# Patient Record
Sex: FEMALE | Race: WHITE | NOT HISPANIC OR LATINO | Employment: UNEMPLOYED | URBAN - METROPOLITAN AREA
[De-identification: names, ages, dates, MRNs, and addresses within clinical notes are randomized per-mention and may not be internally consistent; named-entity substitution may affect disease eponyms.]

---

## 2017-06-26 ENCOUNTER — HOSPITAL ENCOUNTER (OUTPATIENT)
Dept: MRI IMAGING | Facility: HOSPITAL | Age: 42
Discharge: HOME | End: 2017-06-26
Attending: PSYCHIATRY & NEUROLOGY | Admitting: PSYCHIATRY & NEUROLOGY

## 2019-06-12 PROBLEM — Z92.89 HISTORY OF ECHOCARDIOGRAM: Status: ACTIVE | Noted: 2018-12-17

## 2019-07-12 ENCOUNTER — TELEPHONE (OUTPATIENT)
Dept: CARDIOLOGY | Facility: CLINIC | Age: 44
End: 2019-07-12

## 2019-07-12 NOTE — TELEPHONE ENCOUNTER
Patient called this morning stating she has been out of her xarelto 15mg for about 3-4 days and needs samples from the office. I let her know we have some here at the [place] office that she could come  and she stated that she would call back today when she figured out her transportation because she thought she could come to [place] office to get the samples but we aren't there. I stressed the importance of this medication to her and that she should not be off of it and needed to come pick samples up today. Patients insurance will not cover medication so she needs samples. She stated she would call back today and she has not called back.

## 2019-07-31 ENCOUNTER — TELEPHONE (OUTPATIENT)
Dept: CARDIOLOGY | Facility: CLINIC | Age: 44
End: 2019-07-31

## 2019-08-08 DIAGNOSIS — I73.9 PVD (PERIPHERAL VASCULAR DISEASE) (HCC): Primary | ICD-10-CM

## 2019-08-08 RX ORDER — CLOPIDOGREL BISULFATE 75 MG/1
75 TABLET ORAL DAILY
Qty: 30 TABLET | Refills: 5 | Status: SHIPPED | OUTPATIENT
Start: 2019-08-08 | End: 2020-01-16

## 2019-09-11 ENCOUNTER — OFFICE VISIT (OUTPATIENT)
Dept: CARDIOLOGY | Facility: CLINIC | Age: 44
End: 2019-09-11

## 2019-09-11 VITALS
SYSTOLIC BLOOD PRESSURE: 110 MMHG | RESPIRATION RATE: 18 BRPM | BODY MASS INDEX: 33.33 KG/M2 | DIASTOLIC BLOOD PRESSURE: 64 MMHG | HEIGHT: 69 IN | WEIGHT: 225 LBS | HEART RATE: 85 BPM

## 2019-09-11 DIAGNOSIS — E08.21 DIABETES MELLITUS DUE TO UNDERLYING CONDITION WITH DIABETIC NEPHROPATHY, WITH LONG-TERM CURRENT USE OF INSULIN (HCC): ICD-10-CM

## 2019-09-11 DIAGNOSIS — I73.9 PERIPHERAL VASCULAR DISEASE (HCC): Primary | ICD-10-CM

## 2019-09-11 DIAGNOSIS — R07.89 CHEST PAIN, ATYPICAL: ICD-10-CM

## 2019-09-11 DIAGNOSIS — Z79.4 DIABETES MELLITUS DUE TO UNDERLYING CONDITION WITH DIABETIC NEPHROPATHY, WITH LONG-TERM CURRENT USE OF INSULIN (HCC): ICD-10-CM

## 2019-09-11 PROCEDURE — 99214 OFFICE O/P EST MOD 30 MIN: CPT | Performed by: INTERNAL MEDICINE

## 2019-09-11 RX ORDER — LISINOPRIL 5 MG/1
5 TABLET ORAL DAILY
Refills: 3 | COMMUNITY
Start: 2019-08-29 | End: 2020-06-10 | Stop reason: SDUPTHER

## 2019-09-11 RX ORDER — ALBUTEROL SULFATE 90 UG/1
2 AEROSOL, METERED RESPIRATORY (INHALATION) EVERY 4 HOURS PRN
COMMUNITY

## 2019-09-11 NOTE — PROGRESS NOTES
Cardiology clinic note  Merrick Sarmiento MD, PhD  [facility]  Subjective:     Encounter Date:09/11/2019      Patient ID: Constanza Mccall is a 44 y.o. female.    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  Ultimately she was placed on anticoagulation and followed up with repeat AIF with apparent improvement in her distal below the knee runoff.  She has no wounds on her left lower extremity.  During that second procedure she was noted to have ostial SFA disease greater than 80% with significant greater than 30 mm gradient between the SFA and femoral artery.  It underwent CSI with drug-coated balloon angioplasty with good results in mild dissection which healed.  She was ultimately brought back demonstrating good healing of this ostial SFA as well as good distal runoff but she had recurrence of stenosis in the peroneal TP trunk as well as proximal anterior tibial on the left.  She continues to smoke heavily greater than 2 packs/day despite numerous counseling at each visit.  She has been tolerating Plavix and low-dose Xarelto 2.5 twice daily medical therapy.  She has significant diabetic neuropathy with pain in bilateral feet preventing good ambulation.  She has never had a stress evaluation but does remark about sharp substernal left-sided chest pains intermittently.  She is extremely sedentary and does not exert herself.  She has  coronary risk factors of hypertension hyperlipidemia diabetes requiring insulin as well as heavy long-term smoking.  Her EF was previously normal by echo December 2018 EF 60%.  Her concerns today are bilateral lower extremity pain.  Her wound in the right lower extremity which appears an abrasion site rather than arterial insufficiency is well-healed.  No other complaints today no 12 point review of systems except with mentioned above        The following portions of the patient's history were reviewed and updated as appropriate: allergies, current medications, past family history, past medical history, past social history, past surgical history and problem list.    Problem List:  Patient Active Problem List   Diagnosis   • Diabetes mellitus (CMS/HCC)   • Peripheral vascular disease (CMS/HCC)   • History of echocardiogram       Past Medical History:  Past Medical History:   Diagnosis Date   • Diabetes mellitus (CMS/HCC)    • History of echocardiogram 12/17/2018    EF 60% Normal LV diastolic filling parameters. There is normal LV wall thickness. Normla trileaflet aortic valve. Normal mitral valve. Normal LV/RV fucntion with no significant valvulopathy. Normla right anf left pressures estimated.    • Peripheral vascular disease (CMS/HCC)        Past Surgical History:  Past Surgical History:   Procedure Laterality Date   • CHOLECYSTECTOMY         Social History:  Social History     Socioeconomic History   • Marital status:      Spouse name: Not on file   • Number of children: Not on file   • Years of education: Not on file   • Highest education level: Not on file   Tobacco Use   • Smoking status: Current Every Day Smoker     Packs/day: 1.00     Types: Cigarettes   Substance and Sexual Activity   • Alcohol use: No     Frequency: Never   • Drug use: Defer   • Sexual activity: Defer       Allergies:  No Known Allergies    Immunizations:    There is no immunization history on file for this  "patient.    ROS:  ROS  Unremarkable x12 point review of systems except as mentioned above     Objective:         /64 (BP Location: Left arm, Patient Position: Sitting)   Pulse 85   Resp 18   Ht 175.3 cm (69\")   Wt 102 kg (225 lb)   BMI 33.23 kg/m²     Physical Exam  No acute distress alert and oriented x3 afebrile vital signs stable, smells profusely of smoke  Normocephalic atraumatic pupils equal round reactive light extract move intact bilaterally  Edentulous poor dentition  No JVD or carotid bruits  Regular rate and rhythm no rubs gallops  Bilaterally with bronchial breath sounds mild expiratory wheezes  Abdomen obese soft nontender nondistended bowel sounds positive  No clubbing cyanosis or edema  Cap refill less than 2 seconds bilaterally  Neurologically grossly intact bilaterally  No bony abnormalities grossly  Normal mood and affect    In-Office Procedure(s):  Procedures    ASCVD RIsk Score::  The 10-year ASCVD risk score (Khloe BUENO Jr., et al., 2013) is: 6.1%    Values used to calculate the score:      Age: 44 years      Sex: Female      Is Non- : No      Diabetic: Yes      Tobacco smoker: Yes      Systolic Blood Pressure: 110 mmHg      Is BP treated: No      HDL Cholesterol: 31 mg/dL      Total Cholesterol: 153 mg/dL    Recent Radiology:  Imaging Results (most recent)     None          Lab Review:   No visits with results within 6 Month(s) from this visit.   Latest known visit with results is:   No results found for any previous visit.                Assessment:          Diagnosis Plan   1. Peripheral vascular disease (CMS/HCC)     2. Diabetes mellitus due to underlying condition with diabetic nephropathy, with long-term current use of insulin (CMS/[facility])            Plan:      Peripheral vascular disease  Continue Plavix as well as low-dose Xarelto 2.5 twice daily  High intensity statin therapy  Repeat ABIs and Dopplers with segmental pressures    Diabetes  Now on Lantus, per " primary care management    Hypertension, controlled blood pressure 110/64, continue lisinopril with setting of diabetes, creatinine normal    CAD with risk factors and atypical chest pain  Order Lexiscan for ischemic evaluation with risk factors of hypertension, hyperlipidemia, insulin-dependent diabetes and heavy smoking history  Primary prevention goals presently, secondary prevention goals for PVD    Patient will be called to follow-up with testing results    Return to clinic in 3 months      It is a pleasure to be involved in her cardiovascular care.  Continue to follow, please call with any questions or concerns.           Sincerely,       Merrick Sarmiento MD, PhD  09/11/19  .

## 2019-09-25 ENCOUNTER — TELEPHONE (OUTPATIENT)
Dept: CARDIOLOGY | Facility: CLINIC | Age: 44
End: 2019-09-25

## 2019-09-25 NOTE — TELEPHONE ENCOUNTER
Patient is requesting samples of Xarelto 2.5 mg samples, she lives in [place] & would like to pick them up today, if you can call her to let her know if you have any at that location. Pt # [phone]. [email or web address]

## 2019-09-25 NOTE — TELEPHONE ENCOUNTER
I dont have any xarelto here in [place] office today with me. Spoke with patient she only has enough for tonight and tomorrow mornings dose. Will try to arrange to get samples to her tomorrow if possible.... Informed patient to give a notice in advance so we are able to provide samples for her.

## 2019-09-27 ENCOUNTER — APPOINTMENT (OUTPATIENT)
Dept: CARDIOLOGY | Facility: HOSPITAL | Age: 44
End: 2019-09-27

## 2019-09-27 ENCOUNTER — APPOINTMENT (OUTPATIENT)
Dept: NUCLEAR MEDICINE | Facility: HOSPITAL | Age: 44
End: 2019-09-27

## 2019-10-04 ENCOUNTER — HOSPITAL ENCOUNTER (OUTPATIENT)
Dept: CARDIOLOGY | Facility: HOSPITAL | Age: 44
Discharge: HOME | End: 2019-10-04
Admitting: INTERNAL MEDICINE

## 2019-10-04 ENCOUNTER — APPOINTMENT (OUTPATIENT)
Dept: CARDIOLOGY | Facility: HOSPITAL | Age: 44
End: 2019-10-04

## 2019-10-04 ENCOUNTER — HOSPITAL ENCOUNTER (OUTPATIENT)
Dept: NUCLEAR MEDICINE | Facility: HOSPITAL | Age: 44
Discharge: HOME | End: 2019-10-04

## 2019-10-04 DIAGNOSIS — I73.9 PERIPHERAL VASCULAR DISEASE (HCC): ICD-10-CM

## 2019-10-04 DIAGNOSIS — R07.89 CHEST PAIN, ATYPICAL: ICD-10-CM

## 2019-10-04 LAB
BH CV LOWER ARTERIAL LEFT ABI RATIO: 0.77
BH CV LOWER ARTERIAL LEFT DORSALIS PEDIS SYS MAX: 90 MMHG
BH CV LOWER ARTERIAL LEFT GREAT TOE SYS MAX: 55 MMHG
BH CV LOWER ARTERIAL LEFT POST TIBIAL SYS MAX: 83 MMHG
BH CV LOWER ARTERIAL LEFT TBI RATIO: 0.47
BH CV LOWER ARTERIAL RIGHT ABI RATIO: 1.02
BH CV LOWER ARTERIAL RIGHT DORSALIS PEDIS SYS MAX: 119 MMHG
BH CV LOWER ARTERIAL RIGHT GREAT TOE SYS MAX: 82 MMHG
BH CV LOWER ARTERIAL RIGHT POST TIBIAL SYS MAX: 88 MMHG
BH CV LOWER ARTERIAL RIGHT TBI RATIO: 0.7
UPPER ARTERIAL LEFT ARM BRACHIAL SYS MAX: 117 MMHG
UPPER ARTERIAL RIGHT ARM BRACHIAL SYS MAX: 113 MMHG

## 2019-10-04 PROCEDURE — 0 TECHNETIUM SESTAMIBI: Performed by: INTERNAL MEDICINE

## 2019-10-04 PROCEDURE — 93018 CV STRESS TEST I&R ONLY: CPT | Performed by: INTERNAL MEDICINE

## 2019-10-04 PROCEDURE — 93922 UPR/L XTREMITY ART 2 LEVELS: CPT

## 2019-10-04 PROCEDURE — 93016 CV STRESS TEST SUPVJ ONLY: CPT | Performed by: NURSE PRACTITIONER

## 2019-10-04 PROCEDURE — 78452 HT MUSCLE IMAGE SPECT MULT: CPT

## 2019-10-04 PROCEDURE — A9500 TC99M SESTAMIBI: HCPCS | Performed by: INTERNAL MEDICINE

## 2019-10-04 PROCEDURE — 25010000002 REGADENOSON 0.4 MG/5ML SOLUTION: Performed by: INTERNAL MEDICINE

## 2019-10-04 PROCEDURE — 78452 HT MUSCLE IMAGE SPECT MULT: CPT | Performed by: INTERNAL MEDICINE

## 2019-10-04 PROCEDURE — 93017 CV STRESS TEST TRACING ONLY: CPT

## 2019-10-04 RX ADMIN — TECHNETIUM TC 99M SESTAMIBI 1 DOSE: 1 INJECTION INTRAVENOUS at 10:34

## 2019-10-04 RX ADMIN — TECHNETIUM TC 99M SESTAMIBI 1 DOSE: 1 INJECTION INTRAVENOUS at 12:10

## 2019-10-04 RX ADMIN — REGADENOSON 0.4 MG: 0.08 INJECTION, SOLUTION INTRAVENOUS at 12:10

## 2019-10-08 LAB
BH CV NUCLEAR PRIOR STUDY: 3
BH CV STRESS BP STAGE 1: NORMAL
BH CV STRESS BP STAGE 2: NORMAL
BH CV STRESS COMMENTS STAGE 1: NORMAL
BH CV STRESS COMMENTS STAGE 2: NORMAL
BH CV STRESS DOSE REGADENOSON STAGE 1: 0.4
BH CV STRESS DURATION MIN STAGE 1: 4
BH CV STRESS DURATION MIN STAGE 2: 4
BH CV STRESS DURATION SEC STAGE 1: 10
BH CV STRESS DURATION SEC STAGE 2: 0
BH CV STRESS HR STAGE 1: 96
BH CV STRESS HR STAGE 2: 78
BH CV STRESS PROTOCOL 1: NORMAL
BH CV STRESS RECOVERY BP: NORMAL MMHG
BH CV STRESS RECOVERY HR: 78 BPM
BH CV STRESS STAGE 1: 1
BH CV STRESS STAGE 2: 2
LV EF NUC BP: 68 %
MAXIMAL PREDICTED HEART RATE: 176 BPM
PERCENT MAX PREDICTED HR: 54.55 %
STRESS BASELINE BP: NORMAL MMHG
STRESS BASELINE HR: 78 BPM
STRESS PERCENT HR: 64 %
STRESS POST PEAK BP: NORMAL MMHG
STRESS POST PEAK HR: 96 BPM
STRESS TARGET HR: 150 BPM

## 2019-10-09 ENCOUNTER — TELEPHONE (OUTPATIENT)
Dept: CARDIOLOGY | Facility: CLINIC | Age: 44
End: 2019-10-09

## 2019-10-10 DIAGNOSIS — R94.39 ABNORMAL STRESS TEST: Primary | ICD-10-CM

## 2019-10-10 DIAGNOSIS — R07.89 CHEST PAIN, ATYPICAL: ICD-10-CM

## 2019-10-10 NOTE — TELEPHONE ENCOUNTER
Abnormal stress. Per dr harmon he recommends patient to have LHC. Spoke with patient she is ok with proceeding with cath. Will place order for cath and send to mae for scheduling. ALEXUS

## 2019-10-11 DIAGNOSIS — Z01.818 PRE-OP TESTING: ICD-10-CM

## 2019-10-11 DIAGNOSIS — R07.89 CHEST PAIN, ATYPICAL: Primary | ICD-10-CM

## 2019-10-11 PROBLEM — R94.39 ABNORMAL STRESS TEST: Status: ACTIVE | Noted: 2019-10-11

## 2019-10-15 ENCOUNTER — HOSPITAL ENCOUNTER (OUTPATIENT)
Facility: HOSPITAL | Age: 44
Discharge: HOME | End: 2019-10-16
Attending: INTERNAL MEDICINE | Admitting: INTERNAL MEDICINE

## 2019-10-15 ENCOUNTER — LAB (OUTPATIENT)
Dept: LAB | Facility: HOSPITAL | Age: 44
End: 2019-10-15

## 2019-10-15 DIAGNOSIS — R07.89 CHEST PAIN, ATYPICAL: ICD-10-CM

## 2019-10-15 DIAGNOSIS — R94.39 ABNORMAL STRESS TEST: ICD-10-CM

## 2019-10-15 DIAGNOSIS — Z01.818 PRE-OP TESTING: ICD-10-CM

## 2019-10-15 LAB
ALBUMIN SERPL-MCNC: 4.1 G/DL (ref 3.5–5.2)
ALBUMIN/GLOB SERPL: 1.3 G/DL
ALP SERPL-CCNC: 66 U/L (ref 39–117)
ALT SERPL W P-5'-P-CCNC: 16 U/L (ref 1–33)
ANION GAP SERPL CALCULATED.3IONS-SCNC: 16.2 MMOL/L (ref 5–15)
AST SERPL-CCNC: 18 U/L (ref 1–32)
BASOPHILS # BLD AUTO: 0 10*3/MM3 (ref 0–0.2)
BASOPHILS NFR BLD AUTO: 0.6 % (ref 0–1.5)
BILIRUB SERPL-MCNC: 0.3 MG/DL (ref 0.2–1.2)
BUN BLD-MCNC: 6 MG/DL (ref 6–20)
BUN/CREAT SERPL: 10.5 (ref 7–25)
CALCIUM SPEC-SCNC: 9.7 MG/DL (ref 8.6–10.5)
CHLORIDE SERPL-SCNC: 100 MMOL/L (ref 98–107)
CO2 SERPL-SCNC: 23 MMOL/L (ref 22–29)
CREAT BLD-MCNC: 0.57 MG/DL (ref 0.57–1)
DEPRECATED RDW RBC AUTO: 49 FL (ref 37–54)
EOSINOPHIL # BLD AUTO: 0.4 10*3/MM3 (ref 0–0.4)
EOSINOPHIL NFR BLD AUTO: 6.4 % (ref 0.3–6.2)
ERYTHROCYTE [DISTWIDTH] IN BLOOD BY AUTOMATED COUNT: 16.1 % (ref 12.3–15.4)
GFR SERPL CREATININE-BSD FRML MDRD: 115 ML/MIN/1.73
GLOBULIN UR ELPH-MCNC: 3.2 GM/DL
GLUCOSE BLD-MCNC: 167 MG/DL (ref 65–99)
GLUCOSE BLDC GLUCOMTR-MCNC: 108 MG/DL (ref 70–105)
GLUCOSE BLDC GLUCOMTR-MCNC: 258 MG/DL (ref 70–105)
HCT VFR BLD AUTO: 42.2 % (ref 34–46.6)
HGB BLD-MCNC: 14 G/DL (ref 12–15.9)
INR PPP: 0.97 (ref 0.9–1.1)
LYMPHOCYTES # BLD AUTO: 2.2 10*3/MM3 (ref 0.7–3.1)
LYMPHOCYTES NFR BLD AUTO: 34.9 % (ref 19.6–45.3)
MCH RBC QN AUTO: 28.6 PG (ref 26.6–33)
MCHC RBC AUTO-ENTMCNC: 33.3 G/DL (ref 31.5–35.7)
MCV RBC AUTO: 86.1 FL (ref 79–97)
MONOCYTES # BLD AUTO: 0.4 10*3/MM3 (ref 0.1–0.9)
MONOCYTES NFR BLD AUTO: 6.9 % (ref 5–12)
NEUTROPHILS # BLD AUTO: 3.2 10*3/MM3 (ref 1.7–7)
NEUTROPHILS NFR BLD AUTO: 51.2 % (ref 42.7–76)
NRBC BLD AUTO-RTO: 0.1 /100 WBC (ref 0–0.2)
PLATELET # BLD AUTO: 228 10*3/MM3 (ref 140–450)
PMV BLD AUTO: 8.6 FL (ref 6–12)
POTASSIUM BLD-SCNC: 4.2 MMOL/L (ref 3.5–5.2)
PROT SERPL-MCNC: 7.3 G/DL (ref 6–8.5)
PROTHROMBIN TIME: 10.3 SECONDS (ref 9.6–11.7)
RBC # BLD AUTO: 4.9 10*6/MM3 (ref 3.77–5.28)
SODIUM BLD-SCNC: 135 MMOL/L (ref 136–145)
WBC NRBC COR # BLD: 6.3 10*3/MM3 (ref 3.4–10.8)

## 2019-10-15 PROCEDURE — 93458 L HRT ARTERY/VENTRICLE ANGIO: CPT | Performed by: INTERNAL MEDICINE

## 2019-10-15 PROCEDURE — 36415 COLL VENOUS BLD VENIPUNCTURE: CPT

## 2019-10-15 PROCEDURE — 63710000001 INSULIN GLARGINE PER 5 UNITS: Performed by: INTERNAL MEDICINE

## 2019-10-15 PROCEDURE — 93010 ELECTROCARDIOGRAM REPORT: CPT | Performed by: INTERNAL MEDICINE

## 2019-10-15 PROCEDURE — 85025 COMPLETE CBC W/AUTO DIFF WBC: CPT

## 2019-10-15 PROCEDURE — C1894 INTRO/SHEATH, NON-LASER: HCPCS | Performed by: INTERNAL MEDICINE

## 2019-10-15 PROCEDURE — 75716 ARTERY X-RAYS ARMS/LEGS: CPT | Performed by: INTERNAL MEDICINE

## 2019-10-15 PROCEDURE — 82962 GLUCOSE BLOOD TEST: CPT

## 2019-10-15 PROCEDURE — 25010000002 FENTANYL CITRATE (PF) 100 MCG/2ML SOLUTION: Performed by: INTERNAL MEDICINE

## 2019-10-15 PROCEDURE — G0378 HOSPITAL OBSERVATION PER HR: HCPCS

## 2019-10-15 PROCEDURE — 80053 COMPREHEN METABOLIC PANEL: CPT

## 2019-10-15 PROCEDURE — 94640 AIRWAY INHALATION TREATMENT: CPT

## 2019-10-15 PROCEDURE — 85610 PROTHROMBIN TIME: CPT

## 2019-10-15 PROCEDURE — C1760 CLOSURE DEV, VASC: HCPCS | Performed by: INTERNAL MEDICINE

## 2019-10-15 PROCEDURE — 0 IOPAMIDOL PER 1 ML: Performed by: INTERNAL MEDICINE

## 2019-10-15 PROCEDURE — S0260 H&P FOR SURGERY: HCPCS | Performed by: INTERNAL MEDICINE

## 2019-10-15 PROCEDURE — 93005 ELECTROCARDIOGRAM TRACING: CPT | Performed by: INTERNAL MEDICINE

## 2019-10-15 PROCEDURE — C1769 GUIDE WIRE: HCPCS | Performed by: INTERNAL MEDICINE

## 2019-10-15 PROCEDURE — 75625 CONTRAST EXAM ABDOMINL AORTA: CPT | Performed by: INTERNAL MEDICINE

## 2019-10-15 PROCEDURE — 25010000002 MIDAZOLAM PER 1 MG: Performed by: INTERNAL MEDICINE

## 2019-10-15 PROCEDURE — 99152 MOD SED SAME PHYS/QHP 5/>YRS: CPT | Performed by: INTERNAL MEDICINE

## 2019-10-15 PROCEDURE — 99153 MOD SED SAME PHYS/QHP EA: CPT | Performed by: INTERNAL MEDICINE

## 2019-10-15 RX ORDER — LISINOPRIL 5 MG/1
5 TABLET ORAL DAILY
Status: DISCONTINUED | OUTPATIENT
Start: 2019-10-15 | End: 2019-10-16 | Stop reason: HOSPADM

## 2019-10-15 RX ORDER — ATORVASTATIN CALCIUM 20 MG/1
20 TABLET, FILM COATED ORAL NIGHTLY
Status: DISCONTINUED | OUTPATIENT
Start: 2019-10-15 | End: 2019-10-16

## 2019-10-15 RX ORDER — NITROGLYCERIN 5 MG/ML
INJECTION, SOLUTION INTRAVENOUS AS NEEDED
Status: DISCONTINUED | OUTPATIENT
Start: 2019-10-15 | End: 2019-10-15 | Stop reason: HOSPADM

## 2019-10-15 RX ORDER — MIDAZOLAM HYDROCHLORIDE 1 MG/ML
INJECTION INTRAMUSCULAR; INTRAVENOUS AS NEEDED
Status: DISCONTINUED | OUTPATIENT
Start: 2019-10-15 | End: 2019-10-15 | Stop reason: HOSPADM

## 2019-10-15 RX ORDER — INSULIN GLARGINE 100 [IU]/ML
20 INJECTION, SOLUTION SUBCUTANEOUS NIGHTLY
Status: DISCONTINUED | OUTPATIENT
Start: 2019-10-15 | End: 2019-10-16 | Stop reason: HOSPADM

## 2019-10-15 RX ORDER — SODIUM CHLORIDE 9 MG/ML
INJECTION, SOLUTION INTRAVENOUS CONTINUOUS PRN
Status: COMPLETED | OUTPATIENT
Start: 2019-10-15 | End: 2019-10-15

## 2019-10-15 RX ORDER — LIDOCAINE HYDROCHLORIDE 20 MG/ML
INJECTION, SOLUTION INFILTRATION; PERINEURAL AS NEEDED
Status: DISCONTINUED | OUTPATIENT
Start: 2019-10-15 | End: 2019-10-15 | Stop reason: HOSPADM

## 2019-10-15 RX ORDER — SODIUM CHLORIDE 9 MG/ML
3 INJECTION, SOLUTION INTRAVENOUS CONTINUOUS
Status: DISPENSED | OUTPATIENT
Start: 2019-10-15 | End: 2019-10-15

## 2019-10-15 RX ORDER — ALBUTEROL SULFATE 2.5 MG/3ML
2.5 SOLUTION RESPIRATORY (INHALATION)
Status: DISCONTINUED | OUTPATIENT
Start: 2019-10-15 | End: 2019-10-16 | Stop reason: HOSPADM

## 2019-10-15 RX ORDER — FENTANYL CITRATE 50 UG/ML
INJECTION, SOLUTION INTRAMUSCULAR; INTRAVENOUS AS NEEDED
Status: DISCONTINUED | OUTPATIENT
Start: 2019-10-15 | End: 2019-10-15 | Stop reason: HOSPADM

## 2019-10-15 RX ORDER — SODIUM CHLORIDE 9 MG/ML
100 INJECTION, SOLUTION INTRAVENOUS CONTINUOUS
Status: DISCONTINUED | OUTPATIENT
Start: 2019-10-15 | End: 2019-10-16 | Stop reason: HOSPADM

## 2019-10-15 RX ORDER — GUAIFENESIN 1200 MG/1
1200 TABLET, EXTENDED RELEASE ORAL 2 TIMES DAILY
COMMUNITY

## 2019-10-15 RX ORDER — SODIUM CHLORIDE 9 MG/ML
30 INJECTION, SOLUTION INTRAVENOUS CONTINUOUS
Status: DISCONTINUED | OUTPATIENT
Start: 2019-10-15 | End: 2019-10-16 | Stop reason: HOSPADM

## 2019-10-15 RX ORDER — CLOPIDOGREL BISULFATE 75 MG/1
75 TABLET ORAL DAILY
Status: DISCONTINUED | OUTPATIENT
Start: 2019-10-16 | End: 2019-10-16 | Stop reason: HOSPADM

## 2019-10-15 RX ORDER — GABAPENTIN 400 MG/1
400 CAPSULE ORAL EVERY 8 HOURS SCHEDULED
Status: DISCONTINUED | OUTPATIENT
Start: 2019-10-15 | End: 2019-10-16 | Stop reason: HOSPADM

## 2019-10-15 RX ADMIN — INSULIN GLARGINE 20 UNITS: 100 INJECTION, SOLUTION SUBCUTANEOUS at 22:06

## 2019-10-15 RX ADMIN — SODIUM CHLORIDE 30 ML/HR: 900 INJECTION, SOLUTION INTRAVENOUS at 12:08

## 2019-10-15 RX ADMIN — ALBUTEROL SULFATE 2.5 MG: 2.5 SOLUTION RESPIRATORY (INHALATION) at 19:38

## 2019-10-15 RX ADMIN — ATORVASTATIN CALCIUM 20 MG: 20 TABLET, FILM COATED ORAL at 22:07

## 2019-10-15 RX ADMIN — LISINOPRIL 5 MG: 5 TABLET ORAL at 19:05

## 2019-10-15 RX ADMIN — GABAPENTIN 400 MG: 400 CAPSULE ORAL at 22:07

## 2019-10-15 RX ADMIN — GABAPENTIN 400 MG: 400 CAPSULE ORAL at 19:06

## 2019-10-15 NOTE — PLAN OF CARE
Problem: Cardiac Catheterization (Diagnostic/Interventional) (Adult)  Goal: Signs and Symptoms of Listed Potential Problems Will be Absent, Minimized or Managed (Cardiac Catheterization)  Outcome: Ongoing (interventions implemented as appropriate)

## 2019-10-15 NOTE — PLAN OF CARE
Problem: Cardiac Catheterization (Diagnostic/Interventional) (Adult)  Goal: Anesthesia/Sedation Recovery  Outcome: Outcome(s) achieved Date Met: 10/15/19

## 2019-10-15 NOTE — H&P
Cardiology history and physical exam  Merrick Sarmiento MD, PhD  Precatheterization H&P today  Date of service 10-15-19.     Subjective     Patient ID: Constanza Mccall is a 44 y.o. female.     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  Ultimately she was placed on anticoagulation and followed up with repeat AIF with apparent improvement in her distal below the knee runoff.  She has no wounds on her left lower extremity.  During that second procedure she was noted to have ostial SFA disease greater than 80% with significant greater than 30 mm gradient between the SFA and femoral artery.  It underwent CSI with drug-coated balloon angioplasty with good results in mild dissection which healed.  She was ultimately brought back demonstrating good healing of this ostial SFA as well as good distal runoff but she had recurrence of stenosis in the peroneal TP trunk as well as proximal anterior tibial on the left.  She continues to smoke heavily greater than 2 packs/day despite numerous counseling at each visit.  She has been tolerating Plavix and low-dose Xarelto 2.5 twice daily medical therapy.  She has significant diabetic neuropathy with pain in bilateral feet preventing good ambulation.  She has never had a stress evaluation but does remark about sharp substernal left-sided chest pains intermittently.  She is extremely  sedentary and does not exert herself.  She has coronary risk factors of hypertension hyperlipidemia diabetes requiring insulin as well as heavy long-term smoking.  Her EF was previously normal by echo December 2018 EF 60%.  Her concerns today are bilateral lower extremity pain.  Her wound in the right lower extremity which appears an abrasion site rather than arterial insufficiency is well-healed.  No other complaints today no 12 point review of systems except with mentioned above     Today she is seen.  No updates to this.  Abnormal stress test with known PAD and significant higher pretest probability with continuous smoking as well as peripheral vascular disease.  Okay for heart cath today.     The following portions of the patient's history were reviewed and updated as appropriate: allergies, current medications, past family history, past medical history, past social history, past surgical history and problem list.     Problem List:      Patient Active Problem List   Diagnosis   • Diabetes mellitus (CMS/HCC)   • Peripheral vascular disease (CMS/HCC)   • History of echocardiogram         Past Medical History:  Medical History        Past Medical History:   Diagnosis Date   • Diabetes mellitus (CMS/HCC)     • History of echocardiogram 12/17/2018     EF 60% Normal LV diastolic filling parameters. There is normal LV wall thickness. Normla trileaflet aortic valve. Normal mitral valve. Normal LV/RV fucntion with no significant valvulopathy. Normla right anf left pressures estimated.    • Peripheral vascular disease (CMS/HCC)              Past Surgical History:  Surgical History         Past Surgical History:   Procedure Laterality Date   • CHOLECYSTECTOMY                Social History:  Social History   Social History            Socioeconomic History   • Marital status:        Spouse name: Not on file   • Number of children: Not on file   • Years of education: Not on file   • Highest education level: Not on file    Tobacco Use   • Smoking status: Current Every Day Smoker       Packs/day: 1.00       Types: Cigarettes   Substance and Sexual Activity   • Alcohol use: No       Frequency: Never   • Drug use: Defer   • Sexual activity: Defer            Allergies:  No Known Allergies     Immunizations:     There is no immunization history on file for this patient.     ROS:  ROS  Unremarkable x12 point review of systems except as mentioned above     Objective:      Objective          Vitals reviewed, see nursing notes.     Physical Exam  No acute distress alert and oriented x3 afebrile vital signs stable, smells profusely of smoke  Normocephalic atraumatic pupils equal round reactive light extract move intact bilaterally  Edentulous poor dentition  No JVD or carotid bruits  Regular rate and rhythm no rubs gallops  Bilaterally with bronchial breath sounds mild expiratory wheezes  Abdomen obese soft nontender nondistended bowel sounds positive  No clubbing cyanosis or edema  Cap refill less than 2 seconds bilaterally  Neurologically grossly intact bilaterally  No bony abnormalities grossly  Normal mood and affect    Essentially unchanged.     In-Office Procedure(s):  Procedures     ASCVD RIsk Score::  The 10-year ASCVD risk score (Khloe BUENO Jr., et al., 2013) is: 6.1%    Values used to calculate the score:      Age: 44 years      Sex: Female      Is Non- : No      Diabetic: Yes      Tobacco smoker: Yes      Systolic Blood Pressure: 110 mmHg      Is BP treated: No      HDL Cholesterol: 31 mg/dL      Total Cholesterol: 153 mg/dL     Recent Radiology:      Imaging Results (most recent)      None             Lab Review:   No visits with results within 6 Month(s) from this visit.   Latest known visit with results is:   No results found for any previous visit.                     Assessment:      Assessment            Diagnosis Plan   1. Peripheral vascular disease (CMS/HCC)      2. Diabetes mellitus due to underlying  condition with diabetic nephropathy, with long-term current use of insulin (CMS/[facility])               Plan:      Peripheral vascular disease  Continue Plavix as well as low-dose Xarelto 2.5 twice daily, Xarelto held for heart cath today, continue proximal  High intensity statin therapy  Repeat ABIs and Dopplers with segmental pressures, pending at this time     Diabetes  Now on Lantus, per primary care management     Hypertension, controlled blood pressure 110/64, continue lisinopril with setting of diabetes, creatinine normal     CAD with risk factors and atypical chest pain  Abnormal stress, proceed with left heart catheterization today     Proceed with left heart catheterization today.  Definitive evaluation and risk notification.      Merrick Sarmiento MD, PhD

## 2019-10-16 VITALS
DIASTOLIC BLOOD PRESSURE: 49 MMHG | TEMPERATURE: 98.5 F | BODY MASS INDEX: 32.69 KG/M2 | HEIGHT: 69 IN | RESPIRATION RATE: 18 BRPM | OXYGEN SATURATION: 94 % | WEIGHT: 220.68 LBS | SYSTOLIC BLOOD PRESSURE: 97 MMHG | HEART RATE: 76 BPM

## 2019-10-16 PROBLEM — R94.39 ABNORMAL STRESS TEST: Status: RESOLVED | Noted: 2019-10-11 | Resolved: 2019-10-16

## 2019-10-16 PROBLEM — I25.10 CAD (CORONARY ARTERY DISEASE): Status: ACTIVE | Noted: 2019-10-16

## 2019-10-16 PROBLEM — R07.89 CHEST PAIN, ATYPICAL: Status: RESOLVED | Noted: 2019-10-11 | Resolved: 2019-10-16

## 2019-10-16 LAB
ANION GAP SERPL CALCULATED.3IONS-SCNC: 16.1 MMOL/L (ref 5–15)
BUN BLD-MCNC: 7 MG/DL (ref 6–20)
BUN/CREAT SERPL: 12.5 (ref 7–25)
CALCIUM SPEC-SCNC: 9.1 MG/DL (ref 8.6–10.5)
CHLORIDE SERPL-SCNC: 101 MMOL/L (ref 98–107)
CHOLEST SERPL-MCNC: 202 MG/DL (ref 0–200)
CO2 SERPL-SCNC: 22 MMOL/L (ref 22–29)
CREAT BLD-MCNC: 0.56 MG/DL (ref 0.57–1)
DEPRECATED RDW RBC AUTO: 48.6 FL (ref 37–54)
ERYTHROCYTE [DISTWIDTH] IN BLOOD BY AUTOMATED COUNT: 16.3 % (ref 12.3–15.4)
GFR SERPL CREATININE-BSD FRML MDRD: 118 ML/MIN/1.73
GLUCOSE BLD-MCNC: 181 MG/DL (ref 65–99)
HBA1C MFR BLD: 7.6 % (ref 3.5–5.6)
HCT VFR BLD AUTO: 38.1 % (ref 34–46.6)
HDLC SERPL-MCNC: 27 MG/DL (ref 40–60)
HGB BLD-MCNC: 12.8 G/DL (ref 12–15.9)
LDLC SERPL CALC-MCNC: 133 MG/DL (ref 0–100)
LDLC/HDLC SERPL: 4.93 {RATIO}
MCH RBC QN AUTO: 28.9 PG (ref 26.6–33)
MCHC RBC AUTO-ENTMCNC: 33.7 G/DL (ref 31.5–35.7)
MCV RBC AUTO: 85.9 FL (ref 79–97)
PLATELET # BLD AUTO: 196 10*3/MM3 (ref 140–450)
PMV BLD AUTO: 8 FL (ref 6–12)
POTASSIUM BLD-SCNC: 4.1 MMOL/L (ref 3.5–5.2)
RBC # BLD AUTO: 4.43 10*6/MM3 (ref 3.77–5.28)
SODIUM BLD-SCNC: 135 MMOL/L (ref 136–145)
TRIGL SERPL-MCNC: 210 MG/DL (ref 0–150)
VLDLC SERPL-MCNC: 42 MG/DL
WBC NRBC COR # BLD: 7.7 10*3/MM3 (ref 3.4–10.8)

## 2019-10-16 PROCEDURE — 83036 HEMOGLOBIN GLYCOSYLATED A1C: CPT | Performed by: INTERNAL MEDICINE

## 2019-10-16 PROCEDURE — 85027 COMPLETE CBC AUTOMATED: CPT | Performed by: INTERNAL MEDICINE

## 2019-10-16 PROCEDURE — 80061 LIPID PANEL: CPT | Performed by: INTERNAL MEDICINE

## 2019-10-16 PROCEDURE — 80048 BASIC METABOLIC PNL TOTAL CA: CPT | Performed by: INTERNAL MEDICINE

## 2019-10-16 PROCEDURE — 99217 PR OBSERVATION CARE DISCHARGE MANAGEMENT: CPT | Performed by: INTERNAL MEDICINE

## 2019-10-16 PROCEDURE — G0378 HOSPITAL OBSERVATION PER HR: HCPCS

## 2019-10-16 RX ORDER — ATORVASTATIN CALCIUM 40 MG/1
40 TABLET, FILM COATED ORAL NIGHTLY
Status: DISCONTINUED | OUTPATIENT
Start: 2019-10-16 | End: 2019-10-16 | Stop reason: HOSPADM

## 2019-10-16 RX ORDER — ATORVASTATIN CALCIUM 40 MG/1
40 TABLET, FILM COATED ORAL NIGHTLY
Qty: 30 TABLET | Refills: 5 | Status: SHIPPED | OUTPATIENT
Start: 2019-10-16 | End: 2020-04-16

## 2019-10-16 RX ADMIN — CLOPIDOGREL BISULFATE 75 MG: 75 TABLET ORAL at 07:54

## 2019-10-16 RX ADMIN — GABAPENTIN 400 MG: 400 CAPSULE ORAL at 07:55

## 2019-10-16 NOTE — PLAN OF CARE
Problem: Patient Care Overview  Goal: Interprofessional Rounds/Family Conf  Outcome: Outcome(s) achieved Date Met: 10/16/19

## 2019-10-16 NOTE — DISCHARGE INSTRUCTIONS
Post Cath Instructions    .  Specific Physician Instructions: ***    1) Drink plenty of fluids for the next 24 hours.  This helps to eliminate the dye used in your procedure through urination.  You may resume a normal diet; however, try to avoid foods that would cause gas or constipation.    2) Sedative medication given to you during your catheterization may decrease your judgement and reaction time for up to 24-48 hours.  Therefore:  a. DO NOT drive or operate hazardous machinery (48 hours)  b. DO NOT consume alcoholic beverages  c. DO NOT make any important/legal decisions  d. Have someone stay with you for at least 24 hours    3) To allow proper healing and prevent bleeding, the following activities are to be strictly avoided for the next 24-48 hours:  a. Excessive bending at wound site  b. Straining (anything that would tense up muscles around the affected puncture site)  c. Lifting objects greater than 5 pounds, pushing, or pulling for 5 days    i. For Groin Cases:  1. Refrain from sexual activity  2. Refrain from running or vigorous walking  3. No prolonged sitting or standing  4. Limit stair climbing as much as possible    4) Keep the puncture site clean and dry.  You may remove the dressing tomorrow and replace it with a band-aid for at least one additional day.  Gently clean the site with mild soap and water.  No scrubbing/rubbing and lightly pat the area dry.  Showers are acceptable; however, avoid submerging in water (tub baths, hot tubs, swimming pools, dishwater, etc…) for at least one week.  The site should be completely healed before resuming these activities to reduce the risk of infection.  Check the site often.  Watch for signs and symptoms of infection and notify your physician if any of the following occur:  a. Bleeding or an increase in swelling at the puncture site  b. Fever  c. Increased soreness around puncture site  d. Foul odor or significant drainage from the puncture site  e. Swelling,  redness, or warmth at the puncture site    **A bruise or small “pea sized” lump under the skin at the puncture site is not unusual.  This should disappear within 3-4 weeks.**  5) CONTACT YOUR PHYSICIAN OR CALL 911 IF YOU EXPERIENCE ANY OF THE FOLLOWING:  a. Increased angina (chest pain) or frequent sensations of pressure, burning, pain, or other discomfort in the chest, arm, jaws, or stomach  b. Lightheadedness, dizziness, faint feeling, sweating, or difficulty breathing  c. Odd sensation changes like numbness, tingling, coldness, or pain in the arm or leg in which the catheter was inserted  d. Limb in which the catheter was inserted becomes pale/bluish in color    IMPORTANT:  Although this occurs very rarely, if you should develop bright red or excessive bleeding, feel a “pop” inside at the insertion site, or notice a sudden increase in swelling larger than a walnut, you should call 911.  Hold continuous firm pressure to the access site until emergency personnel arrive.  It is best if someone else can do this for you.

## 2019-10-16 NOTE — PLAN OF CARE
Problem: Patient Care Overview  Goal: Discharge Needs Assessment  Outcome: Outcome(s) achieved Date Met: 10/16/19

## 2019-10-16 NOTE — PLAN OF CARE
Problem: Cardiac Catheterization (Diagnostic/Interventional) (Adult)  Goal: Signs and Symptoms of Listed Potential Problems Will be Absent, Minimized or Managed (Cardiac Catheterization)  Outcome: Outcome(s) achieved Date Met: 10/16/19

## 2019-10-16 NOTE — PLAN OF CARE
Problem: Patient Care Overview  Goal: Individualization and Mutuality  Outcome: Outcome(s) achieved Date Met: 10/16/19

## 2019-10-16 NOTE — DISCHARGE SUMMARY
[facility]  Merrick Sarmiento MD, PhD    DISCHARGE SUMMARY      Patient Name: Constanza Mccall  :1975  44 y.o.    Date of Admit: 10/15/2019  Date of Discharge:  10/16/2019    Discharge Diagnosis:  Problem List Items Addressed This Visit        Cardiovascular and Mediastinum    RESOLVED: Abnormal stress test    Relevant Orders    Cardiac Catheterization/Vascular Study (Completed)       Nervous and Auditory    RESOLVED: Chest pain, atypical    Relevant Orders    Cardiac Catheterization/Vascular Study (Completed)        1) CAD s/p cath 10/15 shows small vessel dz diagonal/ OM1 not amenable to revascularization  - 2D echo 2018 showed EF = 60% with no significant VHD.  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  Ultimately she was placed on anticoagulation and followed up with repeat AIF with apparent improvement in her distal below the knee runoff.  She has no wounds on her left lower extremity.  During that second procedure she was  noted to have ostial SFA disease greater than 80% with significant greater than 30 mm gradient between the SFA and femoral artery.  It underwent CSI with drug-coated balloon angioplasty with good results in mild dissection which healed.  She was ultimately brought back demonstrating good healing of this ostial SFA as well as good distal runoff but she had recurrence of stenosis in the peroneal TP trunk as well as proximal anterior tibial on the left.  She continues to smoke heavily greater than 2 packs/day despite numerous counseling at each visit.  She has been tolerating Plavix and low-dose Xarelto 2.5 twice daily medical therapy.  She has significant diabetic neuropathy with pain in bilateral feet preventing good ambulation.  She has never had a stress evaluation but does remark about sharp substernal left-sided chest pains intermittently.  She is extremely sedentary and does not exert herself.  She has coronary risk factors of hypertension hyperlipidemia diabetes requiring insulin as well as heavy long-term smoking.  Her EF was previously normal by echo December 2018 EF 60%.  Her concerns today are bilateral lower extremity pain.  Her wound in the right lower extremity which appears an abrasion site rather than arterial insufficiency is well-healed.  No other complaints today no 12 point review of systems except with mentioned above     Abnormal stress test with known PAD and significant higher pretest probability with continuous smoking as well as peripheral vascular disease.      Patient underwent left heart catheterization with coronary angiography and AIF.  She was found with small vessel CAD (details in report below) not amenable to PCI and obstructive dz ostial proximal left iliac artery, in which she is planned for staged PTLA.  Patient was observed in the hospital overnight due to failed vascular closure device to ensure no significant bleeding complication..  Today she feels well and denies CP or SOA  she has had no bleeding overnight and is ambulatory..  She has ambulated without difficulty and has no right groin hematoma.  Post cath Cr is WNL.  I discussed with the patient indications for lower extremity revascularization and she will cognizant of symptoms of left lower extremity issues or paresthesias although this is somewhat difficult given significant neuropathy and she will decrease her smoking at all costs as I advised with significant vascular disease and debility at such a young age.  I have informed her that smoking will ultimately cost her years of her life as well as significant debility and loss of function.  She has advancing lower extremity peripheral vascular disease and I am concerned about inflow to the left lower extremity which may worsen perfusion pressure.  Her symptoms are somewhat difficult to characterize given significant neuropathy as well as very limited activity and she is unable to perform exercise ABIs.  She will be discharged today to home with follow-up to 4 weeks.        Procedures Performed  Procedure(s):  Left Heart Cath    Indications     Abnormal stress test [R94.39 (ICD-10-CM)]   Chest pain, atypical [R07.89 (ICD-10-CM)]       Conclusion      Merrick Sarmiento MD, PhD  Date of service 10-15-19     Procedure  1.  Left heart catheterization and coronary angiography left ventricular atrophy in FRANK position  2.  Distal aortography with iliofemoral runoff bilaterally  3.  Selective retrograde right femoral angiography with distal runoff to the knee  4.  Nonselective left iliofemoral and SFA/profunda runoff     Indication  1.  Abnormal stress test with high risk for CAD, PAD known, dyspnea on exertion.   2.  Peripheral vascular disease with significant claudication symptoms and known inflow disease, abnormal ABIs.     Technique  After informed consent the patient was brought to the catheterization lab sterilely prepped draped usual fashion with exposure the right  groin for right common femoral arterial access via micropuncture modified Seldinger technique with placement of a 6 [place] sheath to the right common femoral artery and fluoroscopic guidance after 1% lidocaine analgesia.  After the sheath was aspirated flushed with heparinized saline and 035 guidewire was advanced to the aortic arch.  Secondary to difficulty with vascular access as well as claudication symptoms in the right extremity a retrograde injection through the sheath was performed which demonstrated severely small caliber and spasm versus atretic appearance of the right external iliac into femoral artery without significant focal stenosis.  The caliber of this vessel is only 3 to 4 mm compared to the sheath.  Additional runoff to the knee was performed with retrograde nonselective injection in the femoral and external iliac on the right with runoff to the knee demonstrating nonobstructive SFA and profundal disease proximally 50% of each proximally.  Next a pigtail catheter was then advanced over the wire in place in the distal aorta with distal aortography with iliofemoral runoff.  This demonstrated significant inflow disease in the ostial proximal left common iliac with greater than 90% stenosis in appearance of ulcerated plaque versus calcific cleft versus dissection.  There was patent flow in this vessel and the runoff did not appear to be delayed and there was a palpable pulse in the left femoral.  There is no significant obstructive disease in the right external iliac or right common femoral and previous site of intervention at the ostial proximal right SFA had widely patent flow and at the site of prior dissection was well-healed.  There is an ostial 50% stenosis that does not appear flow-limiting.  There is widely patent flow into the left profunda as well.  No imaging of the popliteal or below the knee circulation was performed.  Next attention was turned and the pigtail catheter was then  advanced to level aortic valve followed by cross the aortic valve with left ventricular atrophy in FRANK position, left ventricular end diastolic pressure assessments, and transaortic valve gradient assessment.  This catheter was then exchanged for a diagnostic JL4 catheter for selective engagement of the left main coronary artery followed by multiple cine angiographic images multiple views for evaluation of the totality of the left coronary system.  After diagnostic left coronary angiography this catheter was then exchanged for a JR4 catheter which was used for selective engagement and diagnostic angiography of the right coronary system and multiple views.  There was borderline disease in a small caliber diagonal branch with diffuse distal small vessel disease not in an area highlighted by abnormality and myocardial perfusion imaging stress test.  Noting her severe smoking at greater than 2 to 3 packs/day as well as normal LV systolic function the decision was made to treat this medically.  There was a vascular void with small atretic first obtuse marginal branch that appeared to be proximally and occluded likely responsible for this void in the area of the abnormality highlighted by myocardial perfusion imaging stress.  This was not amenable to intervention.  All catheters and equipment were then removed and the sheath flushed with heparinized saline.  We then attempted to perform Mynx vascular closure however the base device failed and vascular access was lost and manual pressure was held for 25 minutes which resulted in complete hemostasis and no bleeding no hematoma.  This was all performed prior to her leaving the Cath Lab for recovery.  She did well during and throughout the procedure with the Cath Lab chest pain-free, hemodynamically electrically stable and neurologically intact alert talking to staff.  The results of the case were discussed with the patient as well as her daughter in the Cath  Lab.     Moderate conscious sedation was achieved with IV Versed and fentanyl admission by registered nurse with all hemodynamic, ECG, pulse oximetry monitoring continuously throughout the case supervised by myself.  Conscious sedation time 60 minutes.     Results  #1 opening aortic pressure of 107/62  2.  Closing pressure essentially unchanged  3.  LV function normal with LVEF 60% with no regional wall motion abnormality seen  4.  Normal transaortic valve gradient  5.  Normal LVEDP at 10 to 12 mmHg        Angiography  1.  Left main is a large-caliber vessel giving rise to LAD and nondominant circumflex.  There is no angiographic disease left main  2.  The circumflex is nondominant giving rise to 2 obtuse marginal branches and a continuation circumflex and small recurrent left atrial branch.  The proximal circumflex has diffuse atherosclerotic and calcific disease 20% nonobstructive with JOSE-3 flow throughout this distribution.  The mid circumflex also has 10 to 20% calcific atherosclerotic disease as well as the large dominant second obtuse marginal branch which trifurcates distally.  There is no obstructive disease in the second obtuse marginal and continuation circumflex.  The first obtuse marginal appears to be completely totally occluded supplying a vascularly voided area along the basal lateral wall in the region of the stress test observed abnormality.  Is not amenable to intervention.  3.  The LAD is a dominant vessel coursing to and around the apex giving off 2 diagonal branches along its course as well as numerous septal perforators.  The proximal LAD has diffuse luminal irregularities most severely 20% proximally.  The midportion also has 20 to 30% luminal irregularities and likely adventitial calcium in the distal LAD also has 20 to 30% diffuse luminal irregularities.  The apical portion course to and around the apex bifurcating distally without obstructive disease the apical LAD which appears to be 2.5  mm in diameter.  The first diagonal branch is small and insignificant with diffuse small vessel disease.  Second diagonal branch course along the anterolateral wall and bifurcates distally.  There appears to be a mid 70% focal stenosis followed by bifurcation of superior and inferior branches.  The inferior branch has an ostial 80% stenosis.  The distribution of this vessel in the body appears to be 2 mm in diameter or less in the distal inferior branch appears to be less than 1.5 mm in diameter not amenable to stenting in this location.  This was in the distribution not identified by stress MPI abnormality .  Given her smoking history as well as normal LV function and lack of convincing symptoms of chest discomfort and only shortness of breath which could be explained by underlying lung disease this will be treated medically at this time.  4.  The RCA is a dominant vessel giving rise to PLV and PDA branches distally.  There is mild nonobstructive disease less than 30% throughout the RCA.  There is no obstructive disease in diffuse luminal irregularities throughout the PDA and PLV and small vessel disease distally.        Peripheral angiography:  1.  Distal aortography, patent distal aorta  2.  Nonselective runoff of the left iliofemoral system demonstrates proximal ostial greater than 90% stenosis of the left iliac ostium with patent antegrade flow.  There is no obstructive disease in the left external iliac or left common femoral.  3.  Left profunda patent  4.  Left SFA has ostial 50% stenosis and patent distally into Inocente's canal  5.  Right common iliac, external iliac are patent however externally iliac appears very small in caliber approximately 4 mm widening out into the common femoral.  There is no focal stenosis in this distribution.  There is patent flow into the right SFA and right profunda.  6.  The right SFA has proximal to mid 50% eccentric plaque but patent distal flow to the knee.  There is no  obstructive disease in the profunda.     Conclusions  1.  Normal epicardial coronary anatomy with likely obstructive disease and a small caliber diagonal branch at 70% followed by inferior limb ostial 80% small caliber vessel disease.  2.  No significant LAD stenosis, no significant RCA disease, no significant circumflex disease other than possible  of the small caliber OM1 proximally.  3.  Normal LV function  4.  Normal LVEDP  5.  No significant transaortic valve gradient  6.  Significant obstructive PAD which is new since prior encounter with ostial left iliac stenosis as well as small size and diffusely diseased right external iliac without focal stenosis.     Recommendations  1.  Max medical therapy for secondary prevention of coronary artery disease  2.  Consider intervention to the ostial proximal left iliac as will be discussed with patient which will be staged  3.  Continuation of Plavix therapy as well as Xarelto 2.5 mg p.o. twice daily for PAD  4.  Hypertension treatment per guidelines  5.  Strict diabetes control recommended for the patient  6.  Smoking cessation advised at length with extended counseling today based on progression of vascular disease over the past 18 months and noting increasing debility.  7.  Patient will be observed overnight secondary to failure vascular closure device to ensure no bleeding will be discharged in the morning once discharge criteria met.               Consults     No orders found for last 30 day(s).          Pertinent Test Results:   Results from last 7 days   Lab Units 10/16/19  0612 10/15/19  1114   SODIUM mmol/L 135* 135*   POTASSIUM mmol/L 4.1 4.2   CHLORIDE mmol/L 101 100   CO2 mmol/L 22.0 23.0   BUN mg/dL 7 6   CREATININE mg/dL 0.56* 0.57   CALCIUM mg/dL 9.1 9.7   BILIRUBIN mg/dL  --  0.3   ALK PHOS U/L  --  66   ALT (SGPT) U/L  --  16   AST (SGOT) U/L  --  18   GLUCOSE mg/dL 181* 167*         @LABRCNT(bnp)@  Results from last 7 days   Lab Units 10/16/19  0613    WBC 10*3/mm3 7.70   HEMOGLOBIN g/dL 12.8   HEMATOCRIT % 38.1   PLATELETS 10*3/mm3 196     Results from last 7 days   Lab Units 10/15/19  1114   INR  0.97         Results from last 7 days   Lab Units 10/16/19  0612   CHOLESTEROL mg/dL 202*   TRIGLYCERIDES mg/dL 210*   HDL CHOL mg/dL 27*   LDL CHOL mg/dL 133*       ECHOCARDIOGRAM:         Physical Exam  Neuro: AAOx3, cooperative, no focal deficits, neuropathy distally, no wounds distally  CV: S1S2 RRR, no murmur, no gallop no S3 no S4 no heave no lift  Resp: non-labored, +wheeze diffusely, no rales or rhonchi   GI: BS+, abd soft nontender nondistended  Ext: pedal weak and abnormal bilaterally, no edema, right groin without hematoma  Tele: SR    Condition on Discharge: stable    Discharge Medications     Discharge Medications      Changes to Medications      Instructions Start Date   atorvastatin 40 MG tablet  Commonly known as:  LIPITOR  What changed:    · medication strength  · how much to take  · when to take this   40 mg, Oral, Nightly         Continue These Medications      Instructions Start Date   albuterol sulfate  (90 Base) MCG/ACT inhaler  Commonly known as:  PROVENTIL HFA;VENTOLIN HFA;PROAIR HFA   2 puffs, Inhalation, Every 4 Hours PRN      clopidogrel 75 MG tablet  Commonly known as:  PLAVIX   75 mg, Oral, Daily      gabapentin 800 MG tablet  Commonly known as:  NEURONTIN   800 mg, Oral, 3 Times Daily      guaiFENesin ER 1200 MG tablet sustained-release 12 hour   1,200 mg, Oral, 2 Times Daily      Insulin Glargine 100 UNIT/ML injection pen  Commonly known as:  LANTUS SOLOSTAR   20 Units, Subcutaneous, Daily      lisinopril 5 MG tablet  Commonly known as:  PRINIVIL,ZESTRIL   5 mg, Oral, Daily      metFORMIN 1000 MG tablet  Commonly known as:  GLUCOPHAGE   1,000 mg, Oral, 2 Times Daily With Meals      rivaroxaban 15 MG tablet  Commonly known as:  XARELTO   2.5 mg, Oral, 2 Times Daily With Meals             Discharge Diet:   Diet Instructions      Diet: Consistent Carbohydrate, Cardiac      Discharge Diet:   Consistent Carbohydrate  Cardiac             Activity at Discharge:   Activity Instructions     Bathing Restrictions      For one week    Type of Restriction:  Bathing    Bathing Restrictions:  No Tub Bath    Driving Restrictions      Type of Restriction:  Driving    Driving Restrictions:  No Driving (Time Limited)    Length:  Other    Indicate Length of Restriction:  3 days post heart catheterization    Lifting Restrictions      Type of Restriction:  Lifting    Lifting Restrictions:  Lifting Restriction (Indicate Limit)    Weight Limit (Pounds):  4    Length of Lifting Restriction:  3 days post heart catherization          Discharge disposition: home    Follow-up Appointments  Future Appointments   Date Time Provider Department Center   12/11/2019 10:30 AM Merrick Sarmiento MD [facility]     Follow-up with me in 2 weeks, she has been informed to continue Plavix as well as low-dose Xarelto.  Will supply with samples as able.    Test Results Pending at Discharge  Staged revascularization ostial proximal left iliac.    Merrick Sarmiento MD, PhD    10/16/19  9:08 AM    Time: > 30 minutes spent on discharge on discussion of left iliac disease, possible staged intervention, symptoms or signs that would be concerning and prompt hospitalization or ED visit for evaluation as well as medications and chart review.

## 2019-10-16 NOTE — PLAN OF CARE
Problem: Patient Care Overview  Goal: Plan of Care Review  Outcome: Outcome(s) achieved Date Met: 10/16/19

## 2019-10-16 NOTE — PROGRESS NOTES
Discharge Planning Assessment  [facility]     Patient Name: Constanza Mccall  MRN: [id number]  Today's Date: 10/16/2019    Admit Date: 10/15/2019    Discharge Needs Assessment     Row Name 10/16/19 0820       Living Environment    Lives With  child(santosh), adult;spouse    Current Living Arrangements  home/apartment/condo    Primary Care Provided by  self    Able to Return to Prior Arrangements  yes       Resource/Environmental Concerns    Resource/Environmental Concerns  none       Transition Planning    Patient/Family Anticipates Transition to  home with family    Patient/Family Anticipated Services at Transition  none       Discharge Needs Assessment    Concerns to be Addressed  no discharge needs identified;denies needs/concerns at this time    Equipment Currently Used at Home  glucometer;cane, straight;wheelchair;walker, standard    Anticipated Changes Related to Illness  none    Equipment Needed After Discharge  none        Discharge Plan     Row Name 10/16/19 0821       Plan    Plan  routine to home.  Pt denies any additional needs.  Pt states no issues affording medication.  PCP Reyes Masterson    Patient/Family in Agreement with Plan  yes    Final Discharge Disposition Code  01 - home or self-care        Jalyn Goncalves RN    [phone]

## 2019-10-18 ENCOUNTER — TELEPHONE (OUTPATIENT)
Dept: CARDIOLOGY | Facility: CLINIC | Age: 44
End: 2019-10-18

## 2019-10-29 ENCOUNTER — TELEPHONE (OUTPATIENT)
Dept: CARDIOLOGY | Facility: CLINIC | Age: 44
End: 2019-10-29

## 2019-10-29 NOTE — TELEPHONE ENCOUNTER
Pt called requesting samples off Xarelto 2.5 BID (5 bottles of 14 dispensed). She'll pick them up from [facility] 10/30/19      Pt CB# [phone]

## 2019-12-03 ENCOUNTER — TELEPHONE (OUTPATIENT)
Dept: CARDIOLOGY | Facility: CLINIC | Age: 44
End: 2019-12-03

## 2019-12-11 ENCOUNTER — OFFICE VISIT (OUTPATIENT)
Dept: CARDIOLOGY | Facility: CLINIC | Age: 44
End: 2019-12-11

## 2019-12-11 VITALS
BODY MASS INDEX: 32.08 KG/M2 | DIASTOLIC BLOOD PRESSURE: 60 MMHG | WEIGHT: 216.6 LBS | RESPIRATION RATE: 18 BRPM | SYSTOLIC BLOOD PRESSURE: 118 MMHG | HEIGHT: 69 IN | HEART RATE: 94 BPM

## 2019-12-11 DIAGNOSIS — I73.9 PERIPHERAL VASCULAR DISEASE (HCC): ICD-10-CM

## 2019-12-11 DIAGNOSIS — I25.10 CORONARY ARTERY DISEASE INVOLVING NATIVE CORONARY ARTERY OF NATIVE HEART WITHOUT ANGINA PECTORIS: Primary | ICD-10-CM

## 2019-12-11 PROCEDURE — 99214 OFFICE O/P EST MOD 30 MIN: CPT | Performed by: INTERNAL MEDICINE

## 2019-12-11 RX ORDER — CLINDAMYCIN PHOSPHATE 10 MG/G
GEL TOPICAL
Refills: 11 | COMMUNITY
Start: 2019-11-19 | End: 2020-10-28

## 2020-01-02 NOTE — PROGRESS NOTES
Cardiology clinic note  Merrick Sarmiento MD, PhD  [facility] cardiology  Subjective:     Encounter Date:12/11/2019      Patient ID: Constanza Mccall is a 44 y.o. female.    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  Ultimately she was placed on anticoagulation and followed up with repeat AIF with apparent improvement in her distal below the knee runoff.  She has no wounds on her left lower extremity.  During that second procedure she was noted to have ostial SFA disease greater than 80% with significant greater than 30 mm gradient between the SFA and femoral artery.  It underwent CSI with drug-coated balloon angioplasty with good results in mild dissection which healed.  She was ultimately brought back demonstrating good healing of this ostial SFA as well as good distal runoff but she had recurrence of stenosis in the peroneal TP trunk as well as proximal anterior tibial on the left.  She continues to smoke heavily greater than 2 packs/day despite numerous counseling at each visit.  She has been tolerating Plavix and low-dose Xarelto 2.5 twice daily medical therapy.  She has significant diabetic neuropathy with pain in bilateral feet preventing good ambulation.  She has never had a stress evaluation but does remark about sharp substernal left-sided chest pains intermittently.  She is extremely sedentary and does  not exert herself.  She has coronary risk factors of hypertension hyperlipidemia diabetes requiring insulin as well as heavy long-term smoking.  Her EF was previously normal by echo December 2018 EF 60%.  Her concerns today are bilateral lower extremity pain.  Her wound in the right lower extremity which appears an abrasion site rather than arterial insufficiency is well-healed.  No other complaints today no 12 point review of systems except with mentioned above.    Today she presents with stable symptoms of claudication and no tissue loss.  Heart rates in the 90s and blood pressure is 118/60 weight is 216 pounds.  She has trouble ambulating secondary to severe neuropathy, she previously has uncontrolled diabetes and continues to smoke at least a pack per day.  She has been counseled extensively on progression of vascular disease both coronary and peripheral and she will not do well with endovascular or surgical revascularization of her legs in this setting.  She is on dual antiplatelet therapy with aspirin and Plavix in addition to Xarelto 2.5 mg p.o. twice daily.  She has small vessel coronary disease in the diagonal as well as  of OM1 not amenable to revascularization with preserved LV systolic function EF 60% with no significant valvular heart disease.  We are proceeding with secondary prevention goals of CAD for coronary and peripheral vascular disease.  She has residual left ostial common iliac disease by last invasive evaluation.  She is being considered for staged intervention however I would like to have her further decrease smoking which we discussed today.  No other symptoms of chest pain.  Shortness of breath is chronic with NYHA class III symptoms again with severe smoking history underlying likely severe lung disease.  Her biggest complaint is neuropathy bilaterally with painful walking.     The following portions of the patient's history were reviewed and updated as appropriate: allergies, current  "medications, past family history, past medical history, past social history, past surgical history and problem list.    Problem List:  Patient Active Problem List   Diagnosis   • Diabetes mellitus (CMS/HCC)   • Peripheral vascular disease (CMS/HCC)   • History of echocardiogram   • CAD (coronary artery disease)       Past Medical History:  Past Medical History:   Diagnosis Date   • Diabetes mellitus (CMS/HCC)    • History of echocardiogram 12/17/2018    EF 60% Normal LV diastolic filling parameters. There is normal LV wall thickness. Normla trileaflet aortic valve. Normal mitral valve. Normal LV/RV fucntion with no significant valvulopathy. Normla right anf left pressures estimated.    • Peripheral vascular disease (CMS/HCC)        Past Surgical History:  Past Surgical History:   Procedure Laterality Date   • CARDIAC CATHETERIZATION N/A 10/15/2019    Procedure: Left Heart Cath;  Surgeon: Merrick Sarmiento MD;  Location: [facility] CATH INVASIVE LOCATION;  Service: Cardiology   • CHOLECYSTECTOMY         Social History:  Social History     Socioeconomic History   • Marital status:      Spouse name: Not on file   • Number of children: Not on file   • Years of education: Not on file   • Highest education level: Not on file   Tobacco Use   • Smoking status: Current Every Day Smoker     Packs/day: 1.00     Types: Cigarettes   Substance and Sexual Activity   • Alcohol use: No     Frequency: Never   • Drug use: No   • Sexual activity: Defer       Allergies:  No Known Allergies    Immunizations:    There is no immunization history on file for this patient.    ROS:  ROS  Dyspnea on exertion NYHA class III, painful walking with neuropathy and claudication symptoms, no tissue loss otherwise unremarkable x14 point review of systems are negative     Objective:         /60 (BP Location: Left arm, Patient Position: Sitting)   Pulse 94   Resp 18   Ht 175.3 cm (69\")   Wt 98.2 kg (216 lb 9.6 oz)   BMI 31.99 kg/m² "     Physical Exam  No acute distress alert and oriented x3 afebrile vital signs stable, smells profusely of smoke  Normocephalic atraumatic pupils equal round reactive light extract move intact bilaterally  Edentulous poor dentition  No JVD or carotid bruits  Regular rate and rhythm no rubs gallops  Bilaterally with bronchial breath sounds mild expiratory wheezes  Abdomen obese soft nontender nondistended bowel sounds positive  No clubbing cyanosis or edema  Cap refill less than 2 seconds bilaterally  Neurologically grossly intact bilaterally  No bony abnormalities grossly  Normal mood and affect  Vitals reviewed  Essentially unchanged from prior encounter  In-Office Procedure(s):  Procedures    ASCVD RIsk Score::  The 10-year ASCVD risk score (Khloe BUENO Jr., et al., 2013) is: 13.8%    Values used to calculate the score:      Age: 44 years      Sex: Female      Is Non- : No      Diabetic: Yes      Tobacco smoker: Yes      Systolic Blood Pressure: 118 mmHg      Is BP treated: No      HDL Cholesterol: 27 mg/dL      Total Cholesterol: 202 mg/dL    Recent Radiology:  Imaging Results (Most Recent)     None          Lab Review:   Admission on 10/15/2019, Discharged on 10/16/2019   Component Date Value   • Glucose 10/15/2019 108*   • Glucose 10/15/2019 258*   • WBC 10/16/2019 7.70    • RBC 10/16/2019 4.43    • Hemoglobin 10/16/2019 12.8    • Hematocrit 10/16/2019 38.1    • MCV 10/16/2019 85.9    • MCH 10/16/2019 28.9    • MCHC 10/16/2019 33.7    • RDW 10/16/2019 16.3*   • RDW-SD 10/16/2019 48.6    • MPV 10/16/2019 8.0    • Platelets 10/16/2019 196    • Glucose 10/16/2019 181*   • BUN 10/16/2019 7    • Creatinine 10/16/2019 0.56*   • Sodium 10/16/2019 135*   • Potassium 10/16/2019 4.1    • Chloride 10/16/2019 101    • CO2 10/16/2019 22.0    • Calcium 10/16/2019 9.1    • eGFR Non  Amer 10/16/2019 118    • BUN/Creatinine Ratio 10/16/2019 12.5    • Anion Gap 10/16/2019 16.1*   • Hemoglobin A1C  10/16/2019 7.6*   • Total Cholesterol 10/16/2019 202*   • Triglycerides 10/16/2019 210*   • HDL Cholesterol 10/16/2019 27*   • LDL Cholesterol  10/16/2019 133*   • VLDL Cholesterol 10/16/2019 42    • LDL/HDL Ratio 10/16/2019 4.93    Lab on 10/15/2019   Component Date Value   • Protime 10/15/2019 10.3    • INR 10/15/2019 0.97    • Glucose 10/15/2019 167*   • BUN 10/15/2019 6    • Creatinine 10/15/2019 0.57    • Sodium 10/15/2019 135*   • Potassium 10/15/2019 4.2    • Chloride 10/15/2019 100    • CO2 10/15/2019 23.0    • Calcium 10/15/2019 9.7    • Total Protein 10/15/2019 7.3    • Albumin 10/15/2019 4.10    • ALT (SGPT) 10/15/2019 16    • AST (SGOT) 10/15/2019 18    • Alkaline Phosphatase 10/15/2019 66    • Total Bilirubin 10/15/2019 0.3    • eGFR Non African Amer 10/15/2019 115    • Globulin 10/15/2019 3.2    • A/G Ratio 10/15/2019 1.3    • BUN/Creatinine Ratio 10/15/2019 10.5    • Anion Gap 10/15/2019 16.2*   • WBC 10/15/2019 6.30    • RBC 10/15/2019 4.90    • Hemoglobin 10/15/2019 14.0    • Hematocrit 10/15/2019 42.2    • MCV 10/15/2019 86.1    • MCH 10/15/2019 28.6    • MCHC 10/15/2019 33.3    • RDW 10/15/2019 16.1*   • RDW-SD 10/15/2019 49.0    • MPV 10/15/2019 8.6    • Platelets 10/15/2019 228    • Neutrophil % 10/15/2019 51.2    • Lymphocyte % 10/15/2019 34.9    • Monocyte % 10/15/2019 6.9    • Eosinophil % 10/15/2019 6.4*   • Basophil % 10/15/2019 0.6    • Neutrophils, Absolute 10/15/2019 3.20    • Lymphocytes, Absolute 10/15/2019 2.20    • Monocytes, Absolute 10/15/2019 0.40    • Eosinophils, Absolute 10/15/2019 0.40    • Basophils, Absolute 10/15/2019 0.00    • nRBC 10/15/2019 0.1    Hospital Outpatient Visit on 10/04/2019   Component Date Value   • RIGHT DORSALIS PEDIS SYS* 10/04/2019 119    • RIGHT POST TIBIAL SYS MAX 10/04/2019 88    • RIGHT GREAT TOE SYS MAX 10/04/2019 82    • RIGHT DEEPA RATIO 10/04/2019 1.02    • RIGHT TBI RATIO 10/04/2019 0.7    • LEFT DORSALIS PEDIS SYS * 10/04/2019 90    • LEFT POST  TIBIAL SYS MAX 10/04/2019 83    • LEFT GREAT TOE SYS MAX 10/04/2019 55    • LEFT DEEPA RATIO 10/04/2019 0.77    • LEFT TBI RATIO 10/04/2019 0.47    • Upper arterial right arm* 10/04/2019 113    • Upper arterial left arm * 10/04/2019 117    Hospital Outpatient Visit on 10/04/2019   Component Date Value   • BH CV STRESS PROTOCOL 1 10/04/2019 Pharmacologic    • Stage 1 10/04/2019 1    • HR Stage 1 10/04/2019 96    • BP Stage 1 10/04/2019 112/61    • Duration Min Stage 1 10/04/2019 4    • Duration Sec Stage 1 10/04/2019 10    • Stress Dose Regadenoson * 10/04/2019 0.4    • Stress Comments Stage 1 10/04/2019 10 sec bolus injection    • Stage 2 10/04/2019 2    • HR Stage 2 10/04/2019 78    • BP Stage 2 10/04/2019 114/59    • Duration Min Stage 2 10/04/2019 4    • Duration Sec Stage 2 10/04/2019 0    • Stress Comments Stage 2 10/04/2019 recovery    • Baseline HR 10/04/2019 78    • Baseline BP 10/04/2019 122/66    • Peak HR 10/04/2019 96    • Percent Max Pred HR 10/04/2019 54.55    • Percent Target HR 10/04/2019 64    • Peak BP 10/04/2019 112/61    • Recovery HR 10/04/2019 78    • Recovery BP 10/04/2019 114/59    • Target HR (85%) 10/04/2019 150    • Max. Pred. HR (100%) 10/04/2019 176    • Nuclear Prior Study 10/04/2019 3    • Nuc Stress EF 10/04/2019 68                 Assessment:          Diagnosis Plan   1. Coronary artery disease involving native coronary artery of native heart without angina pectoris     2. Peripheral vascular disease (CMS/[facility])            Plan:        Peripheral vascular disease  Continue Plavix as well as low-dose Xarelto 2.5 twice daily  High intensity statin therapy  Repeat ABIs and Dopplers with segmental pressures to evaluate significance of iliac inflow disease angiographically at least 70-80% and eccentric possibly more     Diabetes  Now on Lantus, per primary care management, goal A1c less than 7     Hypertension, controlled blood pressure 118/64, continue lisinopril with setting of diabetes,  creatinine normal     CAD with risk factors and atypical chest pain  Left heart catheterization with nonobstructive CAD in LAD and circumflex and RCA, diffuse luminal irregularities and preserved LV systolic function.  Smoking cessation counseling provided however patient unwilling to quit  Dual antiplatelet therapy with Xarelto low-dose 2.5 twice daily in the setting of PAD  Primary prevention goals presently, secondary prevention goals for PVD     ABIs at least every 6 months     I would prefer to delay further vascular intervention as long as possible in this patient with recurrent disease and continued smoking unless clinically essential with much worsening ABIs clinically or worsening of symptoms or evidence of tissue loss.        It is a pleasure to be involved in her cardiovascular care.  Continue to follow, please call with any questions or concerns.        Greater than 25 minutes spent face-to-face with the patient explaining smoking cessation, coronary disease, demonstration of films, results, therapeutic options, plan of care, smoking cessation counseling, medications, dual any platelet therapy, indications of intervention.                 Merrick Sarmiento MD, PhD  01/02/20  .

## 2020-01-16 DIAGNOSIS — I73.9 PVD (PERIPHERAL VASCULAR DISEASE) (HCC): ICD-10-CM

## 2020-01-16 RX ORDER — CLOPIDOGREL BISULFATE 75 MG/1
75 TABLET ORAL DAILY
Qty: 30 TABLET | Refills: 5 | Status: SHIPPED | OUTPATIENT
Start: 2020-01-16 | End: 2020-06-10 | Stop reason: SDUPTHER

## 2020-01-20 ENCOUNTER — TELEPHONE (OUTPATIENT)
Dept: CARDIOLOGY | Facility: CLINIC | Age: 45
End: 2020-01-20

## 2020-01-20 NOTE — TELEPHONE ENCOUNTER
"Pt called needing samples of \"rivaroxaban (XARELTO) 15 MG tablet BID\"     Pt CB# [phone]  "

## 2020-01-31 ENCOUNTER — HOSPITAL ENCOUNTER (OUTPATIENT)
Dept: CARDIOLOGY | Facility: HOSPITAL | Age: 45
Discharge: HOME | End: 2020-01-31
Admitting: INTERNAL MEDICINE

## 2020-01-31 DIAGNOSIS — I73.9 PERIPHERAL VASCULAR DISEASE (HCC): ICD-10-CM

## 2020-01-31 LAB
BH CV LOWER ARTERIAL LEFT ABI RATIO: 0.86
BH CV LOWER ARTERIAL LEFT DORSALIS PEDIS SYS MAX: 96 MMHG
BH CV LOWER ARTERIAL LEFT GREAT TOE SYS MAX: 76 MMHG
BH CV LOWER ARTERIAL LEFT POST TIBIAL SYS MAX: 89 MMHG
BH CV LOWER ARTERIAL LEFT TBI RATIO: 0.68
BH CV LOWER ARTERIAL RIGHT ABI RATIO: 0.94
BH CV LOWER ARTERIAL RIGHT DORSALIS PEDIS SYS MAX: 105 MMHG
BH CV LOWER ARTERIAL RIGHT GREAT TOE SYS MAX: 69 MMHG
BH CV LOWER ARTERIAL RIGHT POST TIBIAL SYS MAX: 88 MMHG
BH CV LOWER ARTERIAL RIGHT TBI RATIO: 0.62
UPPER ARTERIAL LEFT ARM BRACHIAL SYS MAX: 111 MMHG
UPPER ARTERIAL RIGHT ARM BRACHIAL SYS MAX: 112 MMHG

## 2020-01-31 PROCEDURE — 93922 UPR/L XTREMITY ART 2 LEVELS: CPT

## 2020-04-16 RX ORDER — ATORVASTATIN CALCIUM 40 MG/1
40 TABLET, FILM COATED ORAL NIGHTLY
Qty: 30 TABLET | Refills: 11 | Status: SHIPPED | OUTPATIENT
Start: 2020-04-16 | End: 2020-06-10 | Stop reason: SDUPTHER

## 2020-06-10 ENCOUNTER — OFFICE VISIT (OUTPATIENT)
Dept: CARDIOLOGY | Facility: CLINIC | Age: 45
End: 2020-06-10

## 2020-06-10 VITALS
HEART RATE: 80 BPM | WEIGHT: 216.4 LBS | RESPIRATION RATE: 18 BRPM | HEIGHT: 69 IN | DIASTOLIC BLOOD PRESSURE: 60 MMHG | BODY MASS INDEX: 32.05 KG/M2 | SYSTOLIC BLOOD PRESSURE: 132 MMHG

## 2020-06-10 DIAGNOSIS — I73.9 PERIPHERAL VASCULAR DISEASE (HCC): Primary | ICD-10-CM

## 2020-06-10 DIAGNOSIS — I25.10 CORONARY ARTERY DISEASE INVOLVING NATIVE CORONARY ARTERY OF NATIVE HEART WITHOUT ANGINA PECTORIS: ICD-10-CM

## 2020-06-10 DIAGNOSIS — I73.9 PVD (PERIPHERAL VASCULAR DISEASE) (HCC): ICD-10-CM

## 2020-06-10 DIAGNOSIS — E08.21 DIABETES MELLITUS DUE TO UNDERLYING CONDITION WITH DIABETIC NEPHROPATHY, WITH LONG-TERM CURRENT USE OF INSULIN (HCC): ICD-10-CM

## 2020-06-10 DIAGNOSIS — Z79.4 DIABETES MELLITUS DUE TO UNDERLYING CONDITION WITH DIABETIC NEPHROPATHY, WITH LONG-TERM CURRENT USE OF INSULIN (HCC): ICD-10-CM

## 2020-06-10 PROCEDURE — 99214 OFFICE O/P EST MOD 30 MIN: CPT | Performed by: INTERNAL MEDICINE

## 2020-06-10 RX ORDER — CLOPIDOGREL BISULFATE 75 MG/1
75 TABLET ORAL DAILY
Qty: 30 TABLET | Refills: 5 | Status: SHIPPED | OUTPATIENT
Start: 2020-06-10 | End: 2021-01-06

## 2020-06-10 RX ORDER — ATORVASTATIN CALCIUM 40 MG/1
40 TABLET, FILM COATED ORAL NIGHTLY
Qty: 30 TABLET | Refills: 5 | Status: SHIPPED | OUTPATIENT
Start: 2020-06-10 | End: 2021-01-06

## 2020-06-10 RX ORDER — LISINOPRIL 5 MG/1
5 TABLET ORAL DAILY
Qty: 30 TABLET | Refills: 5 | Status: SHIPPED | OUTPATIENT
Start: 2020-06-10 | End: 2021-01-05

## 2020-06-10 NOTE — PROGRESS NOTES
Cardiology clinic note  Merrick Sarmiento MD, PhD  [facility] cardiology  Subjective:     Encounter Date:06/10/2020      Patient ID: Constanza Mccall is a 45 y.o. female.    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  Ultimately she was placed on anticoagulation and followed up with repeat AIF with apparent improvement in her distal below the knee runoff.  She has no wounds on her left lower extremity.  During that second procedure she was noted to have ostial SFA disease greater than 80% with significant greater than 30 mm gradient between the SFA and femoral artery.  It underwent CSI with drug-coated balloon angioplasty with good results in mild dissection which healed.  She was ultimately brought back demonstrating good healing of this ostial SFA as well as good distal runoff but she had recurrence of stenosis in the peroneal TP trunk as well as proximal anterior tibial on the left.  On previous encounters, she continues to smoke heavily greater than 2 packs/day despite numerous counseling at each visit.  She has been tolerating Plavix and low-dose Xarelto 2.5 twice daily medical therapy.  She has significant diabetic neuropathy with pain in bilateral feet preventing good ambulation.  Sugars are relatively uncontrolled despite adequate current counseling.    She is extremely sedentary and does not exert herself.  She has coronary risk  factors of hypertension hyperlipidemia diabetes requiring insulin as well as heavy long-term smoking.  Her EF was previously normal by echo December 2018 EF 60%.  She underwent left heart catheterization secondary to high risk pre-test probability as well as left-sided substernal chest pain with findings of small vessel coronary disease in the diagonal as well as  of OM1 not amenable to revascularization with preserved LV systolic function EF 60% with no significant valvular heart disease.     Today she presents back to clinic otherwise unchanged.  She continues to smoke 2-1/2 packs of cigarettes per day not interested in quitting smoking.  Her sugars and diabetes have remained relatively uncontrolled and she has progressive neuropathy with painful feet right is painful to walk.  Her shortness of breath is chronic with NYHA class II-III status again secondary to chronic smoking.  Her EF is normal without more than grade 1 diastolic dysfunction.  She has a left heel wound which appears more from dry cracking of the skin rather than ischemic nature and there is erythema with good cap refill around this.  Dorsalis pedis has a palpable pulse, ABIs demonstrated mildly reduced DEEPA with mild digital ischemia on the left and right DEEPA normal with mild digital ischemia on the right.  She was being evaluated for stage intervention to the ostial left iliac however recurrent vascular intervention for this patient in the setting of uncontrolled diabetes and continued chronic smoking nondistended quitting is suboptimal.  She continues to have minimal tissue loss, no cellulitis, no significant wounds or threatened limb at this point and we discussed further deferral of vascular intervention until essential clinically given these issues.  We did discuss that vascular disease peripherally is likely to continue unabated if diabetes and smoking are left unchecked which she understands.  No chest pain.  No palpitations no  presyncope.  Her main complaint is chronic pain in her feet difficulty walking    Review of systems some 14 point review of systems is negative except was mentioned above    Historical data copied forward from previous encounters is unchanged      The following portions of the patient's history were reviewed and updated as appropriate: allergies, current medications, past family history, past medical history, past social history, past surgical history and problem list.    Problem List:  Patient Active Problem List   Diagnosis   • Diabetes mellitus (CMS/HCC)   • Peripheral vascular disease (CMS/HCC)   • History of echocardiogram   • CAD (coronary artery disease)       Past Medical History:  Past Medical History:   Diagnosis Date   • Diabetes mellitus (CMS/HCC)    • History of echocardiogram 12/17/2018    EF 60% Normal LV diastolic filling parameters. There is normal LV wall thickness. Normla trileaflet aortic valve. Normal mitral valve. Normal LV/RV fucntion with no significant valvulopathy. Normla right anf left pressures estimated.    • Peripheral vascular disease (CMS/HCC)        Past Surgical History:  Past Surgical History:   Procedure Laterality Date   • CARDIAC CATHETERIZATION N/A 10/15/2019    Procedure: Left Heart Cath;  Surgeon: Merrick Sarmiento MD;  Location: [facility] CATH INVASIVE LOCATION;  Service: Cardiology   • CHOLECYSTECTOMY         Social History:  Social History     Socioeconomic History   • Marital status:      Spouse name: Not on file   • Number of children: Not on file   • Years of education: Not on file   • Highest education level: Not on file   Tobacco Use   • Smoking status: Current Every Day Smoker     Packs/day: 2.50     Types: Cigarettes   Substance and Sexual Activity   • Alcohol use: No     Frequency: Never   • Drug use: No   • Sexual activity: Defer       Allergies:  No Known Allergies    Immunizations:    There is no immunization history on file for this  "patient.    ROS:  ROS       Objective:         /60 (BP Location: Left arm, Patient Position: Sitting)   Pulse 80   Resp 18   Ht 175.3 cm (69\")   Wt 98.2 kg (216 lb 6.4 oz)   BMI 31.96 kg/m²     Physical Exam  No acute distress alert and orient x3 afebrile vital signs stable  Normocephalic atraumatic pupils equal round extraocular intact bilaterally  Tracheal neck supple no carotid bruits no JVD  Regular rate rhythm no rubs murmurs or gallops  Soft nontender nondistended bowel sounds positive  Delayed expiratory phase with wheezing no rales or rhonchi  No clubbing or cyanosis or significant edema  Dorsalis pedis palpable right greater than left, normal cap refill, left heel wound which appears more from cracking dried skin rather than ischemia no embolic phenomenon seen  No bony abnormalities grossly  Neurologically grossly intact bilaterally no motor deficits now, no dysarthria or aphasia  Normal mood and affect  In-Office Procedure(s):  Procedures    ASCVD RIsk Score::  The 10-year ASCVD risk score (Khloe BUENO Jr., et al., 2013) is: 7.2%    Values used to calculate the score:      Age: 45 years      Sex: Female      Is Non- : No      Diabetic: Yes      Tobacco smoker: Yes      Systolic Blood Pressure: 132 mmHg      Is BP treated: No      HDL Cholesterol: 30 mg/dL      Total Cholesterol: 132 mg/dL    Recent Radiology:  Imaging Results (Most Recent)     None          Lab Review:   Hospital Outpatient Visit on 01/31/2020   Component Date Value   • RIGHT DORSALIS PEDIS SYS* 01/31/2020 105    • RIGHT POST TIBIAL SYS MAX 01/31/2020 88    • RIGHT GREAT TOE SYS MAX 01/31/2020 69    • RIGHT DEEPA RATIO 01/31/2020 0.94    • RIGHT TBI RATIO 01/31/2020 0.62    • LEFT DORSALIS PEDIS SYS * 01/31/2020 96    • LEFT POST TIBIAL SYS MAX 01/31/2020 89    • LEFT GREAT TOE SYS MAX 01/31/2020 76    • LEFT DEEPA RATIO 01/31/2020 0.86    • LEFT TBI RATIO 01/31/2020 0.68    • Upper arterial right arm* 01/31/2020 " 112    • Upper arterial left arm * 01/31/2020 111                 Assessment and plan:         Peripheral vascular disease  Continue Plavix as well as low-dose Xarelto 2.5 twice daily  High intensity statin therapy  Repeat ABIs and Dopplers with segmental pressures to evaluate significance of iliac inflow disease angiographically at least 70-80% and eccentric possibly more, DEEPA mildly reduced on the left with mild digital ischemia but no significant ischemic wounds seen, no tissue loss, no rest pain.  Continue to defer any revascularization at this time with uncontrolled diabetes and severely uncontrolled smoking greater than 2 packs/day continued     Diabetes   goal A1c less than 7     Hypertension, controlled blood pressure less than 140 systolic continue lisinopril with setting of diabetes, creatinine normal previously     CAD with risk factors and atypical chest pain  Left heart catheterization with nonobstructive CAD in LAD and circumflex and RCA, diffuse luminal irregularities and preserved LV systolic function.  Smoking cessation counseling provided however patient unwilling to quit  Dual antiplatelet therapy with Xarelto low-dose 2.5 twice daily in the setting of PAD  Primary prevention goals presently, secondary prevention goals for PVD     ABIs at least every 6 months  Return to clinic in 6 months     I would prefer to delay further vascular intervention as long as possible in this patient with recurrent disease and continued smoking unless clinically essential with much worsening ABIs clinically or worsening of symptoms or evidence of tissue loss.        It is a pleasure to be involved in her cardiovascular care.  Continue to follow, please call with any questions or concerns.         Greater than 25 minutes spent face-to-face with the patient explaining smoking cessation, coronary disease, demonstration of films, results, therapeutic options, plan of care, smoking cessation counseling, medications, dual  any platelet therapy, indications of intervention again today.      Level of Care:                 Merrick Sarmiento MD  06/10/20  .

## 2020-10-28 ENCOUNTER — OFFICE VISIT (OUTPATIENT)
Dept: CARDIOLOGY | Facility: CLINIC | Age: 45
End: 2020-10-28

## 2020-10-28 VITALS
DIASTOLIC BLOOD PRESSURE: 70 MMHG | WEIGHT: 223.2 LBS | RESPIRATION RATE: 18 BRPM | HEIGHT: 69 IN | SYSTOLIC BLOOD PRESSURE: 122 MMHG | HEART RATE: 78 BPM | BODY MASS INDEX: 33.06 KG/M2

## 2020-10-28 DIAGNOSIS — I25.10 CORONARY ARTERY DISEASE INVOLVING NATIVE CORONARY ARTERY OF NATIVE HEART WITHOUT ANGINA PECTORIS: Primary | ICD-10-CM

## 2020-10-28 DIAGNOSIS — I73.9 PERIPHERAL VASCULAR DISEASE (HCC): ICD-10-CM

## 2020-10-28 PROCEDURE — 99214 OFFICE O/P EST MOD 30 MIN: CPT | Performed by: INTERNAL MEDICINE

## 2020-10-28 NOTE — PROGRESS NOTES
Cardiology clinic visit  Merrick Sarmiento MD, PhD  Subjective:     Encounter Date:10/28/2020      Patient ID: Constanza Mccall is a 45 y.o. female.    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  Ultimately she was placed on anticoagulation and followed up with repeat AIF with apparent improvement in her distal below the knee runoff.  She has no wounds on her left lower extremity.  During that second procedure she was noted to have ostial SFA disease greater than 80% with significant greater than 30 mm gradient between the SFA and femoral artery.  It underwent CSI with drug-coated balloon angioplasty with good results in mild dissection which healed.  She was ultimately brought back demonstrating good healing of this ostial SFA as well as good distal runoff but she had recurrence of stenosis in the peroneal TP trunk as well as proximal anterior tibial on the left.  On previous encounters, she continues to smoke heavily greater than 2 packs/day despite numerous counseling at each visit.  She has been tolerating Plavix and low-dose Xarelto 2.5 twice daily medical therapy.  She has significant diabetic neuropathy with pain in bilateral feet preventing good ambulation.  Sugars are relatively uncontrolled despite adequate current counseling.    She is extremely sedentary and does not exert herself.  She has coronary risk factors of hypertension  hyperlipidemia diabetes requiring insulin as well as heavy long-term smoking.  Her EF was previously normal by echo December 2018 EF 60%.  She underwent left heart catheterization secondary to high risk pre-test probability as well as left-sided substernal chest pain with findings of small vessel coronary disease in the diagonal as well as  of OM1 not amenable to revascularization with preserved LV systolic function EF 60% with no significant valvular heart disease.      Last clinic she presents back to clinic otherwise unchanged.  She continues to smoke 2-1/2 packs of cigarettes per day not interested in quitting smoking.  Her sugars and diabetes have remained relatively uncontrolled and she has progressive neuropathy with painful feet right is painful to walk.  Her shortness of breath is chronic with NYHA class II-III status again secondary to chronic smoking.  Her EF is normal without more than grade 1 diastolic dysfunction.  She has a left heel wound which appears more from dry cracking of the skin rather than ischemic nature and there is erythema with good cap refill around this.  Dorsalis pedis has a palpable pulse, ABIs demonstrated mildly reduced DEEPA with mild digital ischemia on the left and right DEEPA normal with mild digital ischemia on the right.  She was being evaluated for stage intervention to the ostial left iliac however recurrent vascular intervention for this patient in the setting of uncontrolled diabetes and continued chronic smoking nondistended quitting is suboptimal.  She continues to have minimal tissue loss, no cellulitis, no significant wounds or threatened limb at this point and we discussed further deferral of vascular intervention until essential clinically given these issues.  We did discuss that vascular disease peripherally is likely to continue unabated if diabetes and smoking are left unchecked which she understands.  No chest pain.  No palpitations no presyncope.  Her main  complaint is chronic pain in her feet difficulty walking    Today on encounter she continues to smoke heavily, she has a burn on her right foot where she stepped on a grill top that remained hot, this is managed but does not appear infected.  There is palpable pulse in the dorsalis pedis bilaterally and there is good cap refill.  She denies any refractory claudication but has chronic neuropathy.  We did discuss concerning signs or symptoms of infection that would lead her to the hospital.  We discussed cleaning measures for this burn wound as well as antibiotic ointment and keeping it clean as well as smoking cessation again today.  No other complaints of chest pain shortness of breath heart failure signs or symptoms or rest claudication.     Review of systems some 14 point review of systems is negative except was mentioned above     Historical data copied forward from previous encounters is unchanged     The following portions of the patient's history were reviewed and updated as appropriate: allergies, current medications, past family history, past medical history, past social history, past surgical history and problem list.    Problem List:  Patient Active Problem List   Diagnosis   • Diabetes mellitus (CMS/HCC)   • Peripheral vascular disease (CMS/HCC)   • History of echocardiogram   • CAD (coronary artery disease)       Past Medical History:  Past Medical History:   Diagnosis Date   • Diabetes mellitus (CMS/HCC)    • History of echocardiogram 12/17/2018    EF 60% Normal LV diastolic filling parameters. There is normal LV wall thickness. Normla trileaflet aortic valve. Normal mitral valve. Normal LV/RV fucntion with no significant valvulopathy. Normla right anf left pressures estimated.    • Peripheral vascular disease (CMS/HCC)        Past Surgical History:  Past Surgical History:   Procedure Laterality Date   • CARDIAC CATHETERIZATION N/A 10/15/2019    Procedure: Left Heart Cath;  Surgeon: Merrick Sarmiento  "MD Jonathan;  Location: [facility] INVASIVE LOCATION;  Service: Cardiology   • CHOLECYSTECTOMY         Social History:  Social History     Socioeconomic History   • Marital status:      Spouse name: Not on file   • Number of children: Not on file   • Years of education: Not on file   • Highest education level: Not on file   Tobacco Use   • Smoking status: Current Every Day Smoker     Packs/day: 2.50     Types: Cigarettes   Substance and Sexual Activity   • Alcohol use: No     Frequency: Never   • Drug use: No   • Sexual activity: Defer       Allergies:  No Known Allergies    Immunizations:    There is no immunization history on file for this patient.    ROS:  ROS       Objective:         /70 (BP Location: Left arm, Patient Position: Sitting)   Pulse 78   Resp 18   Ht 175.3 cm (69\")   Wt 101 kg (223 lb 3.2 oz)   BMI 32.96 kg/m²     Physical Exam  No acute distress alert and orient x3 afebrile vital signs stable  Normocephalic atraumatic pupils equal round extraocular intact bilaterally  Tracheal neck supple no carotid bruits no JVD  Regular rate rhythm no rubs murmurs or gallops  Soft nontender nondistended bowel sounds positive  Delayed expiratory phase with wheezing no rales or rhonchi  No clubbing or cyanosis or significant edema  Dorsalis pedis palpable right greater than left, normal cap refill, left heel wound which appears more from cracking dried skin rather than ischemia no embolic phenomenon seen  No bony abnormalities grossly  Neurologically grossly intact bilaterally no motor deficits now, no dysarthria or aphasia  Normal mood and affect  In-Office Procedure(s):  Procedures    ASCVD RIsk Score::  The 10-year ASCVD risk score (Decaturanette BUENO Jr., et al., 2013) is: 11.5%    Values used to calculate the score:      Age: 45 years      Sex: Female      Is Non- : No      Diabetic: Yes      Tobacco smoker: Yes      Systolic Blood Pressure: 122 mmHg      Is BP treated: Yes      HDL " Cholesterol: 24 mg/dL      Total Cholesterol: 131 mg/dL    Recent Radiology:  Imaging Results (Most Recent)     None          Lab Review:   No visits with results within 6 Month(s) from this visit.   Latest known visit with results is:   Hospital Outpatient Visit on 01/31/2020   Component Date Value   • RIGHT DORSALIS PEDIS SYS* 01/31/2020 105    • RIGHT POST TIBIAL SYS MAX 01/31/2020 88    • RIGHT GREAT TOE SYS MAX 01/31/2020 69    • RIGHT DEEPA RATIO 01/31/2020 0.94    • RIGHT TBI RATIO 01/31/2020 0.62    • LEFT DORSALIS PEDIS SYS * 01/31/2020 96    • LEFT POST TIBIAL SYS MAX 01/31/2020 89    • LEFT GREAT TOE SYS MAX 01/31/2020 76    • LEFT DEEPA RATIO 01/31/2020 0.86    • LEFT TBI RATIO 01/31/2020 0.68    • Upper arterial right arm* 01/31/2020 112    • Upper arterial left arm * 01/31/2020 111                 Assessment:          Diagnosis Plan   1. Coronary artery disease involving native coronary artery of native heart without angina pectoris     2. Peripheral vascular disease (CMS/[facility])            Plan:      Peripheral vascular disease  Continue Plavix as well as low-dose Xarelto 2.5 twice daily  High intensity statin therapy  Repeat ABIs and Dopplers with segmental pressures intermittently to evaluate significance of iliac inflow disease angiographically at least 70% with eccentric narrowing, DEEPA mildly reduced on the left with mild digital ischemia but no significant ischemic wounds seen, no tissue loss, no rest pain.  Continue to defer any revascularization at this time with uncontrolled diabetes and severely uncontrolled smoking greater than 2 packs/day continued    Repeat ABIs in 6 months    Diabetes   goal A1c less than 7     Hypertension, controlled blood pressure less than 140 systolic continue lisinopril with setting of diabetes, creatinine normal previously     CAD with risk factors and atypical chest pain  Left heart catheterization with nonobstructive CAD in LAD and circumflex and RCA, diffuse luminal  irregularities and preserved LV systolic function.  Smoking cessation counseling provided however patient unwilling to quit  Dual antiplatelet therapy with Xarelto low-dose 2.5 twice daily in the setting of PAD  Primary prevention goals presently, secondary prevention goals for PVD     ABIs at least every 6 months  Return to clinic in 6 months    Discuss smoking cessation at length again today but she continues to smoke heavily.  Discussed wound healing issues as well as result     I would prefer to delay further vascular intervention as long as possible in this patient with recurrent disease and continued smoking unless clinically essential with much worsening ABIs clinically or worsening of symptoms or evidence of tissue loss.        It is a pleasure to be involved in her cardiovascular care.  Continue to follow, please call with any questions or concerns.         Greater than 25 minutes spent face-to-face with the patient explaining smoking cessation, coronary disease, demonstration of films, results, therapeutic options, plan of care, smoking cessation counseling, medications, dual any platelet therapy, indications of intervention again today.         Level of Care:                 Merrick Sarmiento MD  10/28/20  .

## 2021-01-05 DIAGNOSIS — I73.9 PVD (PERIPHERAL VASCULAR DISEASE) (HCC): ICD-10-CM

## 2021-01-06 RX ORDER — LISINOPRIL 10 MG/1
TABLET ORAL
Qty: 15 TABLET | Refills: 5 | Status: SHIPPED | OUTPATIENT
Start: 2021-01-06 | End: 2021-07-30

## 2021-01-06 RX ORDER — RIVAROXABAN 2.5 MG/1
TABLET, FILM COATED ORAL
Qty: 60 TABLET | Refills: 5 | Status: SHIPPED | OUTPATIENT
Start: 2021-01-06 | End: 2021-07-29

## 2021-01-06 RX ORDER — ATORVASTATIN CALCIUM 40 MG/1
40 TABLET, FILM COATED ORAL NIGHTLY
Qty: 30 TABLET | Refills: 5 | Status: SHIPPED | OUTPATIENT
Start: 2021-01-06 | End: 2021-07-29

## 2021-01-06 RX ORDER — CLOPIDOGREL BISULFATE 75 MG/1
75 TABLET ORAL DAILY
Qty: 30 TABLET | Refills: 5 | Status: SHIPPED | OUTPATIENT
Start: 2021-01-06 | End: 2021-07-29

## 2021-03-12 ENCOUNTER — HOSPITAL ENCOUNTER (EMERGENCY)
Facility: HOSPITAL | Age: 46
Discharge: HOME | End: 2021-03-12
Attending: EMERGENCY MEDICINE | Admitting: EMERGENCY MEDICINE

## 2021-03-12 ENCOUNTER — TELEPHONE (OUTPATIENT)
Dept: CARDIOLOGY | Facility: CLINIC | Age: 46
End: 2021-03-12

## 2021-03-12 ENCOUNTER — APPOINTMENT (OUTPATIENT)
Dept: GENERAL RADIOLOGY | Facility: HOSPITAL | Age: 46
End: 2021-03-12

## 2021-03-12 VITALS
TEMPERATURE: 98.1 F | SYSTOLIC BLOOD PRESSURE: 116 MMHG | WEIGHT: 221.12 LBS | RESPIRATION RATE: 22 BRPM | DIASTOLIC BLOOD PRESSURE: 69 MMHG | BODY MASS INDEX: 32.75 KG/M2 | HEART RATE: 71 BPM | OXYGEN SATURATION: 97 % | HEIGHT: 69 IN

## 2021-03-12 DIAGNOSIS — R07.9 CHEST PAIN, UNSPECIFIED TYPE: ICD-10-CM

## 2021-03-12 DIAGNOSIS — J20.9 ACUTE BRONCHITIS, UNSPECIFIED ORGANISM: Primary | ICD-10-CM

## 2021-03-12 LAB
ANION GAP SERPL CALCULATED.3IONS-SCNC: 11 MMOL/L (ref 5–15)
APTT PPP: 32.5 SECONDS (ref 24–31)
BASOPHILS # BLD AUTO: 0.1 10*3/MM3 (ref 0–0.2)
BASOPHILS NFR BLD AUTO: 1.5 % (ref 0–1.5)
BUN SERPL-MCNC: 3 MG/DL (ref 6–20)
BUN/CREAT SERPL: 5.6 (ref 7–25)
CALCIUM SPEC-SCNC: 9.9 MG/DL (ref 8.6–10.5)
CHLORIDE SERPL-SCNC: 100 MMOL/L (ref 98–107)
CO2 SERPL-SCNC: 26 MMOL/L (ref 22–29)
CREAT SERPL-MCNC: 0.54 MG/DL (ref 0.57–1)
DEPRECATED RDW RBC AUTO: 45.5 FL (ref 37–54)
EOSINOPHIL # BLD AUTO: 0.4 10*3/MM3 (ref 0–0.4)
EOSINOPHIL NFR BLD AUTO: 5.9 % (ref 0.3–6.2)
ERYTHROCYTE [DISTWIDTH] IN BLOOD BY AUTOMATED COUNT: 14.6 % (ref 12.3–15.4)
GFR SERPL CREATININE-BSD FRML MDRD: 122 ML/MIN/1.73
GLUCOSE SERPL-MCNC: 193 MG/DL (ref 65–99)
HCT VFR BLD AUTO: 37.6 % (ref 34–46.6)
HGB BLD-MCNC: 12.9 G/DL (ref 12–15.9)
INR PPP: 0.99 (ref 0.93–1.1)
LYMPHOCYTES # BLD AUTO: 2 10*3/MM3 (ref 0.7–3.1)
LYMPHOCYTES NFR BLD AUTO: 26.8 % (ref 19.6–45.3)
MCH RBC QN AUTO: 30.1 PG (ref 26.6–33)
MCHC RBC AUTO-ENTMCNC: 34.3 G/DL (ref 31.5–35.7)
MCV RBC AUTO: 87.8 FL (ref 79–97)
MONOCYTES # BLD AUTO: 0.3 10*3/MM3 (ref 0.1–0.9)
MONOCYTES NFR BLD AUTO: 4.6 % (ref 5–12)
NEUTROPHILS NFR BLD AUTO: 4.5 10*3/MM3 (ref 1.7–7)
NEUTROPHILS NFR BLD AUTO: 61.2 % (ref 42.7–76)
NRBC BLD AUTO-RTO: 0 /100 WBC (ref 0–0.2)
PLATELET # BLD AUTO: 245 10*3/MM3 (ref 140–450)
PMV BLD AUTO: 8.2 FL (ref 6–12)
POTASSIUM SERPL-SCNC: 3.9 MMOL/L (ref 3.5–5.2)
PROTHROMBIN TIME: 10.9 SECONDS (ref 9.6–11.7)
RBC # BLD AUTO: 4.28 10*6/MM3 (ref 3.77–5.28)
SARS-COV-2 RNA PNL SPEC NAA+PROBE: NORMAL
SODIUM SERPL-SCNC: 137 MMOL/L (ref 136–145)
TROPONIN T SERPL-MCNC: <0.01 NG/ML (ref 0–0.03)
WBC # BLD AUTO: 7.4 10*3/MM3 (ref 3.4–10.8)

## 2021-03-12 PROCEDURE — 80048 BASIC METABOLIC PNL TOTAL CA: CPT | Performed by: EMERGENCY MEDICINE

## 2021-03-12 PROCEDURE — 85025 COMPLETE CBC W/AUTO DIFF WBC: CPT | Performed by: EMERGENCY MEDICINE

## 2021-03-12 PROCEDURE — 99283 EMERGENCY DEPT VISIT LOW MDM: CPT

## 2021-03-12 PROCEDURE — 71045 X-RAY EXAM CHEST 1 VIEW: CPT

## 2021-03-12 PROCEDURE — 85730 THROMBOPLASTIN TIME PARTIAL: CPT | Performed by: EMERGENCY MEDICINE

## 2021-03-12 PROCEDURE — 93005 ELECTROCARDIOGRAM TRACING: CPT | Performed by: EMERGENCY MEDICINE

## 2021-03-12 PROCEDURE — 85610 PROTHROMBIN TIME: CPT | Performed by: EMERGENCY MEDICINE

## 2021-03-12 PROCEDURE — 84484 ASSAY OF TROPONIN QUANT: CPT | Performed by: EMERGENCY MEDICINE

## 2021-03-12 PROCEDURE — 87635 SARS-COV-2 COVID-19 AMP PRB: CPT | Performed by: EMERGENCY MEDICINE

## 2021-03-12 RX ORDER — AZITHROMYCIN 250 MG/1
TABLET, FILM COATED ORAL
Qty: 6 TABLET | Refills: 0 | Status: SHIPPED | OUTPATIENT
Start: 2021-03-12 | End: 2021-05-05

## 2021-03-12 RX ORDER — SODIUM CHLORIDE 0.9 % (FLUSH) 0.9 %
10 SYRINGE (ML) INJECTION AS NEEDED
Status: DISCONTINUED | OUTPATIENT
Start: 2021-03-12 | End: 2021-03-12 | Stop reason: HOSPADM

## 2021-03-12 RX ORDER — METHYLPREDNISOLONE 4 MG/1
TABLET ORAL
Qty: 21 TABLET | Refills: 0 | Status: SHIPPED | OUTPATIENT
Start: 2021-03-12 | End: 2021-05-05

## 2021-03-12 NOTE — ED PROVIDER NOTES
Subjective   Chief complaint: Chest pain    46-year-old female with a history of CAD and peripheral vascular disease presents with chest pain.  Patient states symptoms have been present over the past couple days.  She states she mostly notices it when she sneezes or coughs.  Cough is nonproductive.  She has had no fever.  She denies any shortness of breath or diaphoresis.  She denies any alleviating or exacerbating factors.  No known sick contacts.      History provided by:  Patient      Review of Systems   Constitutional: Negative for fever.   HENT: Negative for congestion and sore throat.    Eyes: Negative for redness.   Respiratory: Positive for cough. Negative for shortness of breath.    Cardiovascular: Positive for chest pain.   Gastrointestinal: Negative for abdominal pain, diarrhea and vomiting.   Genitourinary: Negative for dysuria.   Musculoskeletal: Negative for back pain.   Skin: Negative for rash.   Neurological: Negative for dizziness and headaches.   Psychiatric/Behavioral: Negative for confusion.       Past Medical History:   Diagnosis Date   • Diabetes mellitus (CMS/HCC)    • History of echocardiogram 12/17/2018    EF 60% Normal LV diastolic filling parameters. There is normal LV wall thickness. Normla trileaflet aortic valve. Normal mitral valve. Normal LV/RV fucntion with no significant valvulopathy. Normla right anf left pressures estimated.    • Peripheral vascular disease (CMS/HCC)        No Known Allergies    Past Surgical History:   Procedure Laterality Date   • CARDIAC CATHETERIZATION N/A 10/15/2019    Procedure: Left Heart Cath;  Surgeon: Merrick Sarmiento MD;  Location: [facility] CATH INVASIVE LOCATION;  Service: Cardiology   • CHOLECYSTECTOMY         Family History   Problem Relation Age of Onset   • No Known Problems Mother    • No Known Problems Father        Social History     Socioeconomic History   • Marital status:      Spouse name: Not on file   • Number of children: Not  "on file   • Years of education: Not on file   • Highest education level: Not on file   Tobacco Use   • Smoking status: Current Every Day Smoker     Packs/day: 2.50     Types: Cigarettes   Substance and Sexual Activity   • Alcohol use: No   • Drug use: No   • Sexual activity: Defer       /82 (BP Location: Left arm, Patient Position: Sitting)   Pulse 67   Temp 98 °F (36.7 °C) (Oral)   Resp 22   Ht 175.3 cm (69\")   Wt 100 kg (221 lb 1.9 oz)   LMP  (LMP Unknown)   SpO2 96%   BMI 32.65 kg/m²       Objective   Physical Exam  Vitals and nursing note reviewed.   Constitutional:       Appearance: She is well-developed.   HENT:      Head: Normocephalic and atraumatic.   Eyes:      Extraocular Movements: Extraocular movements intact.      Pupils: Pupils are equal, round, and reactive to light.   Cardiovascular:      Rate and Rhythm: Normal rate and regular rhythm.      Heart sounds: Normal heart sounds.   Pulmonary:      Effort: Pulmonary effort is normal. No respiratory distress.      Breath sounds: Normal breath sounds.   Abdominal:      General: Bowel sounds are normal.      Palpations: Abdomen is soft.      Tenderness: There is no abdominal tenderness.   Musculoskeletal:         General: Normal range of motion.      Cervical back: Normal range of motion and neck supple.   Skin:     General: Skin is warm and dry.   Neurological:      General: No focal deficit present.      Mental Status: She is alert and oriented to person, place, and time.         Procedures           ED Course      My interpretation of EKG shows sinus rhythm, rate of 74, no ST elevation     Results for orders placed or performed during the hospital encounter of 03/12/21   COVID-19, [facility] IN-HOUSE,NASAL Swab (NO TRANSPORT MEDIA) 2 HR TAT - Swab, Nasopharynx    Specimen: Nasopharynx; Swab   Result Value Ref Range    COVID19 Presumptive Negative Presumptive Negative - Ref. Range   Basic Metabolic Panel    Specimen: Blood   Result Value Ref " Range    Glucose 193 (H) 65 - 99 mg/dL    BUN 3 (L) 6 - 20 mg/dL    Creatinine 0.54 (L) 0.57 - 1.00 mg/dL    Sodium 137 136 - 145 mmol/L    Potassium 3.9 3.5 - 5.2 mmol/L    Chloride 100 98 - 107 mmol/L    CO2 26.0 22.0 - 29.0 mmol/L    Calcium 9.9 8.6 - 10.5 mg/dL    eGFR Non African Amer 122 >60 mL/min/1.73    BUN/Creatinine Ratio 5.6 (L) 7.0 - 25.0    Anion Gap 11.0 5.0 - 15.0 mmol/L   Protime-INR    Specimen: Blood   Result Value Ref Range    Protime 10.9 9.6 - 11.7 Seconds    INR 0.99 0.93 - 1.10   aPTT    Specimen: Blood   Result Value Ref Range    PTT 32.5 (H) 24.0 - 31.0 seconds   Troponin    Specimen: Blood   Result Value Ref Range    Troponin T <0.010 0.000 - 0.030 ng/mL   CBC Auto Differential    Specimen: Blood   Result Value Ref Range    WBC 7.40 3.40 - 10.80 10*3/mm3    RBC 4.28 3.77 - 5.28 10*6/mm3    Hemoglobin 12.9 12.0 - 15.9 g/dL    Hematocrit 37.6 34.0 - 46.6 %    MCV 87.8 79.0 - 97.0 fL    MCH 30.1 26.6 - 33.0 pg    MCHC 34.3 31.5 - 35.7 g/dL    RDW 14.6 12.3 - 15.4 %    RDW-SD 45.5 37.0 - 54.0 fl    MPV 8.2 6.0 - 12.0 fL    Platelets 245 140 - 450 10*3/mm3    Neutrophil % 61.2 42.7 - 76.0 %    Lymphocyte % 26.8 19.6 - 45.3 %    Monocyte % 4.6 (L) 5.0 - 12.0 %    Eosinophil % 5.9 0.3 - 6.2 %    Basophil % 1.5 0.0 - 1.5 %    Neutrophils, Absolute 4.50 1.70 - 7.00 10*3/mm3    Lymphocytes, Absolute 2.00 0.70 - 3.10 10*3/mm3    Monocytes, Absolute 0.30 0.10 - 0.90 10*3/mm3    Eosinophils, Absolute 0.40 0.00 - 0.40 10*3/mm3    Basophils, Absolute 0.10 0.00 - 0.20 10*3/mm3    nRBC 0.0 0.0 - 0.2 /100 WBC   ECG 12 Lead   Result Value Ref Range    QT Interval 403 ms     XR Chest 1 View    Result Date: 3/12/2021  No acute chest finding.  Electronically Signed By-Christina Wills MD On:3/12/2021 12:47 PM This report was finalized on [id number] by  Christina Wills MD.                                    MDM   Patient had the above evaluation.  Results were discussed with the patient.  Patient remains  well-appearing in the emergency room.  Work-up has been unremarkable.  Chest x-ray shows no acute disease.  EKG shows no acute ischemia.  Troponin is negative.  Covid screen is negative.  Symptoms consistent with bronchitis.  Patient will be discharged to follow-up with her primary doctor.  She will be given prescriptions for Medrol Dosepak and azithromycin.      Final diagnoses:   Acute bronchitis, unspecified organism   Chest pain, unspecified type            Tahir Thomas MD  03/12/21 [id number]

## 2021-03-12 NOTE — TELEPHONE ENCOUNTER
MARV  Pt called stating the past couple days she's experienced cp and want to know what to do?   She hasn't gone to ER.     Pt CB# [phone]

## 2021-03-12 NOTE — ED NOTES
C/o chest pain nonradiating and states it's only when she coughs x 2 - 3 days. Pt reports she smokes 4 packs of cigarettes per day     Marilyn Saeed RN  03/12/21 [id number]

## 2021-03-14 LAB — QT INTERVAL: 403 MS

## 2021-05-05 ENCOUNTER — OFFICE VISIT (OUTPATIENT)
Dept: CARDIOLOGY | Facility: CLINIC | Age: 46
End: 2021-05-05

## 2021-05-05 VITALS
SYSTOLIC BLOOD PRESSURE: 122 MMHG | HEART RATE: 84 BPM | RESPIRATION RATE: 18 BRPM | HEIGHT: 69 IN | BODY MASS INDEX: 32.85 KG/M2 | DIASTOLIC BLOOD PRESSURE: 60 MMHG | WEIGHT: 221.8 LBS

## 2021-05-05 DIAGNOSIS — I25.10 CORONARY ARTERY DISEASE INVOLVING NATIVE CORONARY ARTERY OF NATIVE HEART WITHOUT ANGINA PECTORIS: ICD-10-CM

## 2021-05-05 DIAGNOSIS — I73.9 PVD (PERIPHERAL VASCULAR DISEASE) (HCC): Primary | ICD-10-CM

## 2021-05-05 DIAGNOSIS — R94.39 ABNORMAL STRESS TEST: ICD-10-CM

## 2021-05-05 DIAGNOSIS — R07.89 CHEST PAIN, ATYPICAL: ICD-10-CM

## 2021-05-05 PROCEDURE — 99214 OFFICE O/P EST MOD 30 MIN: CPT | Performed by: INTERNAL MEDICINE

## 2021-07-29 DIAGNOSIS — I73.9 PVD (PERIPHERAL VASCULAR DISEASE) (HCC): ICD-10-CM

## 2021-07-29 RX ORDER — ATORVASTATIN CALCIUM 40 MG/1
40 TABLET, FILM COATED ORAL NIGHTLY
Qty: 30 TABLET | Refills: 5 | Status: SHIPPED | OUTPATIENT
Start: 2021-07-29 | End: 2021-12-06 | Stop reason: SDUPTHER

## 2021-07-29 RX ORDER — CLOPIDOGREL BISULFATE 75 MG/1
75 TABLET ORAL DAILY
Qty: 30 TABLET | Refills: 5 | Status: SHIPPED | OUTPATIENT
Start: 2021-07-29 | End: 2021-12-06 | Stop reason: SDUPTHER

## 2021-07-29 RX ORDER — RIVAROXABAN 2.5 MG/1
TABLET, FILM COATED ORAL
Qty: 60 TABLET | Refills: 5 | Status: SHIPPED | OUTPATIENT
Start: 2021-07-29 | End: 2021-12-06 | Stop reason: SDUPTHER

## 2021-07-30 RX ORDER — LISINOPRIL 10 MG/1
TABLET ORAL
Qty: 15 TABLET | Refills: 5 | Status: SHIPPED | OUTPATIENT
Start: 2021-07-30 | End: 2021-12-06 | Stop reason: SDUPTHER

## 2021-08-09 ENCOUNTER — TELEPHONE (OUTPATIENT)
Dept: CARDIOLOGY | Facility: CLINIC | Age: 46
End: 2021-08-09

## 2021-08-09 NOTE — TELEPHONE ENCOUNTER
Pt would like to know if we can refill her asthma inhaler, Albuterol? Her PCP said she didn't need it anymore but it stops her from coughing.    Thanks,  Peggy

## 2021-11-10 ENCOUNTER — OFFICE VISIT (OUTPATIENT)
Dept: CARDIOLOGY | Facility: CLINIC | Age: 46
End: 2021-11-10

## 2021-11-10 VITALS
SYSTOLIC BLOOD PRESSURE: 106 MMHG | BODY MASS INDEX: 31.37 KG/M2 | WEIGHT: 211.8 LBS | DIASTOLIC BLOOD PRESSURE: 60 MMHG | RESPIRATION RATE: 18 BRPM | HEIGHT: 69 IN | HEART RATE: 88 BPM

## 2021-11-10 DIAGNOSIS — I73.9 PERIPHERAL VASCULAR DISEASE (HCC): ICD-10-CM

## 2021-11-10 DIAGNOSIS — E08.21 DIABETES MELLITUS DUE TO UNDERLYING CONDITION WITH DIABETIC NEPHROPATHY, WITH LONG-TERM CURRENT USE OF INSULIN (HCC): ICD-10-CM

## 2021-11-10 DIAGNOSIS — R07.89 CHEST PAIN, ATYPICAL: ICD-10-CM

## 2021-11-10 DIAGNOSIS — R94.39 ABNORMAL STRESS TEST: ICD-10-CM

## 2021-11-10 DIAGNOSIS — I73.9 PVD (PERIPHERAL VASCULAR DISEASE) (HCC): Primary | ICD-10-CM

## 2021-11-10 DIAGNOSIS — I25.10 CORONARY ARTERY DISEASE INVOLVING NATIVE CORONARY ARTERY OF NATIVE HEART WITHOUT ANGINA PECTORIS: ICD-10-CM

## 2021-11-10 DIAGNOSIS — Z79.4 DIABETES MELLITUS DUE TO UNDERLYING CONDITION WITH DIABETIC NEPHROPATHY, WITH LONG-TERM CURRENT USE OF INSULIN (HCC): ICD-10-CM

## 2021-11-10 PROCEDURE — 99214 OFFICE O/P EST MOD 30 MIN: CPT | Performed by: INTERNAL MEDICINE

## 2021-11-10 NOTE — PROGRESS NOTES
Cardiology clinic note  Merrick Sarmiento MD, PhD  Subjective:     Encounter Date:11/10/2021      Patient ID: Constanza Mccall is a 46 y.o. female.    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  Ultimately she was placed on anticoagulation and followed up with repeat AIF with apparent improvement in her distal below the knee runoff.  She has no wounds on her left lower extremity.  During that second procedure she was noted to have ostial SFA disease greater than 80% with significant greater than 30 mm gradient between the SFA and femoral artery.  It underwent CSI with drug-coated balloon angioplasty with good results in mild dissection which healed.  She was ultimately brought back demonstrating good healing of this ostial SFA as well as good distal runoff but she had recurrence of stenosis in the peroneal TP trunk as well as proximal anterior tibial on the left.  On previous encounters, she continues to smoke heavily greater than 2 packs/day despite numerous counseling at each visit.  She has been tolerating Plavix and low-dose Xarelto 2.5 twice daily medical therapy for quite some time.  She has significant diabetic neuropathy with pain in bilateral feet preventing good ambulation. She also has musculoskeletal pain knees and hips. Glucose reading and A1c have been chronically uncontrolled despite adequate current counseling.    She is largely  sedentary and does not exert herself much again secondary to above issues.  She has coronary risk factors of hypertension hyperlipidemia diabetes requiring insulin as well as heavy long-term smoking.  Her EF was previously normal by echo December 2018 EF 60%.  She underwent left heart catheterization secondary to high risk pre-test probability with ongoing recurrent chest pain as well as left-sided substernal chest pain with findings of small vessel coronary disease in the diagonal as well as  of OM1 not amenable to revascularization with preserved LV systolic function EF 60% with no significant valvular heart disease. She is also had peripheral vascular intervention as described. Most recently ABIs demonstrated mildly reduced DEEPA with mild digital ischemia on the left and right DEEPA normal with mild digital ischemia on the right.  She was being evaluated for stage intervention to the ostial left iliac however recurrent vascular intervention for this patient in the setting of uncontrolled diabetes and continued chronic smoking nondistended quitting is suboptimal.  She continues to have minimal tissue loss, no cellulitis, no significant wounds or threatened limb at this point and we discussed further deferral of vascular intervention until essential clinically given these issues as well stable claudication versus neurogenic claudication.  We did discuss that vascular disease peripherally is likely to continue unabated if diabetes and smoking are left unchecked which she understands.  No chest pain.  No palpitations no presyncope.  Her main complaint is chronic pain in her feet difficulty walking. Her overall condition on encounter today is unchanged we addressed a variety of issues including her entire cardiac history, risk factors, medications today risks and benefits and necessity of each.    Back to clinic today, left ankle brace on board, no claudication symptoms other than progression.  No toe wounds or tissue  loss.  No anginal chest pain or shortness of breath.  No heart failure signs or symptoms.  Still smoking advised to quit today extensively similar to before.  Reviewed the above history with her extensively today.  A lot of social stressors keep her from quitting smoking she says.  Advised.  Blood pressure glucose and cholesterol control      Review of systems some 14 point review of systems is negative except was mentioned above     Historical data copied forward from previous encounters is unchanged      Assessment plan per my encounter  PVD  Stable claudication  Continue Plavix and low-dose Xarelto  Primary ventricles for CAD but needs cholesterol LDL less than 70 with PVD equivalent  Needs repeat ABIs and peripheral Dopplers for staging and surveillance with previous peripheral intervention and balloon angioplasty    Return to clinic in 1 year, primary care in the interim  Smoking cessation counseling, diabetes control and lipid control discussed at length along with exercise program      The following portions of the patient's history were reviewed and updated as appropriate: allergies, current medications, past family history, past medical history, past social history, past surgical history and problem list.    Problem List:  Patient Active Problem List   Diagnosis   • Diabetes mellitus (HCC)   • Peripheral vascular disease (HCC)   • History of echocardiogram   • CAD (coronary artery disease)       Past Medical History:  Past Medical History:   Diagnosis Date   • Diabetes mellitus (HCC)    • History of echocardiogram 12/17/2018    EF 60% Normal LV diastolic filling parameters. There is normal LV wall thickness. Normla trileaflet aortic valve. Normal mitral valve. Normal LV/RV fucntion with no significant valvulopathy. Normla right anf left pressures estimated.    • Peripheral vascular disease (HCC)        Past Surgical History:  Past Surgical History:   Procedure Laterality Date   • CARDIAC CATHETERIZATION N/A  "10/15/2019    Procedure: Left Heart Cath;  Surgeon: Merrick Sarmiento MD;  Location: [facility] CATH INVASIVE LOCATION;  Service: Cardiology   • CHOLECYSTECTOMY         Social History:  Social History     Socioeconomic History   • Marital status:    Tobacco Use   • Smoking status: Current Every Day Smoker     Packs/day: 2.50     Types: Cigarettes   Substance and Sexual Activity   • Alcohol use: No   • Drug use: No   • Sexual activity: Defer       Allergies:  No Known Allergies    Immunizations:    There is no immunization history on file for this patient.    ROS:  ROS       Objective:         /60 (BP Location: Left arm, Patient Position: Sitting)   Pulse 88   Resp 18   Ht 175.3 cm (69\")   Wt 96.1 kg (211 lb 12.8 oz)   LMP  (LMP Unknown)   BMI 31.28 kg/m²     Physical Exam    In-Office Procedure(s):  Procedures    ASCVD RIsk Score::  The 10-year ASCVD risk score (Khloe BUENO Jr., et al., 2013) is: 6.8%    Values used to calculate the score:      Age: 46 years      Sex: Female      Is Non- : No      Diabetic: Yes      Tobacco smoker: Yes      Systolic Blood Pressure: 106 mmHg      Is BP treated: No      HDL Cholesterol: 24 mg/dL      Total Cholesterol: 131 mg/dL    Recent Radiology:  Imaging Results (Most Recent)     None          Lab Review:   No visits with results within 6 Month(s) from this visit.   Latest known visit with results is:   Admission on 03/12/2021, Discharged on 03/12/2021   Component Date Value   • QT Interval 03/12/2021 403    • Glucose 03/12/2021 193*   • BUN 03/12/2021 3*   • Creatinine 03/12/2021 0.54*   • Sodium 03/12/2021 137    • Potassium 03/12/2021 3.9    • Chloride 03/12/2021 100    • CO2 03/12/2021 26.0    • Calcium 03/12/2021 9.9    • eGFR Non  Amer 03/12/2021 122    • BUN/Creatinine Ratio 03/12/2021 5.6*   • Anion Gap 03/12/2021 11.0    • Protime 03/12/2021 10.9    • INR 03/12/2021 0.99    • PTT 03/12/2021 32.5*   • Troponin T 03/12/2021 " <0.010    • WBC 03/12/2021 7.40    • RBC 03/12/2021 4.28    • Hemoglobin 03/12/2021 12.9    • Hematocrit 03/12/2021 37.6    • MCV 03/12/2021 87.8    • MCH 03/12/2021 30.1    • MCHC 03/12/2021 34.3    • RDW 03/12/2021 14.6    • RDW-SD 03/12/2021 45.5    • MPV 03/12/2021 8.2    • Platelets 03/12/2021 245    • Neutrophil % 03/12/2021 61.2    • Lymphocyte % 03/12/2021 26.8    • Monocyte % 03/12/2021 4.6*   • Eosinophil % 03/12/2021 5.9    • Basophil % 03/12/2021 1.5    • Neutrophils, Absolute 03/12/2021 4.50    • Lymphocytes, Absolute 03/12/2021 2.00    • Monocytes, Absolute 03/12/2021 0.30    • Eosinophils, Absolute 03/12/2021 0.40    • Basophils, Absolute 03/12/2021 0.10    • nRBC 03/12/2021 0.0    • COVID19 03/12/2021 Presumptive Negative                 Assessment:         No diagnosis found.       Plan:            Level of Care:                 Merrick Sarmiento MD  11/10/21  .

## 2021-11-26 ENCOUNTER — APPOINTMENT (OUTPATIENT)
Dept: CARDIOLOGY | Facility: HOSPITAL | Age: 46
End: 2021-11-26

## 2021-12-06 ENCOUNTER — TELEPHONE (OUTPATIENT)
Dept: CARDIOLOGY | Facility: CLINIC | Age: 46
End: 2021-12-06

## 2021-12-06 DIAGNOSIS — I73.9 PVD (PERIPHERAL VASCULAR DISEASE) (HCC): ICD-10-CM

## 2021-12-06 RX ORDER — LISINOPRIL 10 MG/1
5 TABLET ORAL DAILY
Qty: 15 TABLET | Refills: 5 | Status: SHIPPED | OUTPATIENT
Start: 2021-12-06 | End: 2022-07-15 | Stop reason: SDUPTHER

## 2021-12-06 RX ORDER — CLOPIDOGREL BISULFATE 75 MG/1
75 TABLET ORAL DAILY
Qty: 30 TABLET | Refills: 5 | Status: SHIPPED | OUTPATIENT
Start: 2021-12-06 | End: 2022-07-15 | Stop reason: SDUPTHER

## 2021-12-06 RX ORDER — ATORVASTATIN CALCIUM 40 MG/1
40 TABLET, FILM COATED ORAL NIGHTLY
Qty: 30 TABLET | Refills: 5 | Status: SHIPPED | OUTPATIENT
Start: 2021-12-06 | End: 2022-07-15 | Stop reason: SDUPTHER

## 2021-12-06 NOTE — TELEPHONE ENCOUNTER
She was in a few weeks ago  She was suppose to have xerelto 2.5,clopidogrel  75 mg ,atorvastatin 40 mg,and lisinopril 10 mg [facility]   They haven't received  them  She is out

## 2021-12-17 ENCOUNTER — HOSPITAL ENCOUNTER (OUTPATIENT)
Dept: CARDIOLOGY | Facility: HOSPITAL | Age: 46
Discharge: HOME | End: 2021-12-17
Admitting: INTERNAL MEDICINE

## 2021-12-17 DIAGNOSIS — I73.9 PVD (PERIPHERAL VASCULAR DISEASE) (HCC): ICD-10-CM

## 2021-12-17 LAB
BH CV LOWER ARTERIAL LEFT ABI RATIO: 0.86
BH CV LOWER ARTERIAL LEFT DORSALIS PEDIS SYS MAX: 109 MMHG
BH CV LOWER ARTERIAL LEFT GREAT TOE SYS MAX: 105 MMHG
BH CV LOWER ARTERIAL LEFT POST TIBIAL SYS MAX: 51 MMHG
BH CV LOWER ARTERIAL LEFT TBI RATIO: 0.83
BH CV LOWER ARTERIAL RIGHT ABI RATIO: 0.96
BH CV LOWER ARTERIAL RIGHT DORSALIS PEDIS SYS MAX: 122 MMHG
BH CV LOWER ARTERIAL RIGHT GREAT TOE SYS MAX: 111 MMHG
BH CV LOWER ARTERIAL RIGHT POST TIBIAL SYS MAX: 113 MMHG
BH CV LOWER ARTERIAL RIGHT TBI RATIO: 0.87
MAXIMAL PREDICTED HEART RATE: 174 BPM
STRESS TARGET HR: 148 BPM
UPPER ARTERIAL LEFT ARM BRACHIAL SYS MAX: 127 MMHG
UPPER ARTERIAL RIGHT ARM BRACHIAL SYS MAX: 125 MMHG

## 2021-12-17 PROCEDURE — 93922 UPR/L XTREMITY ART 2 LEVELS: CPT

## 2021-12-20 ENCOUNTER — TELEPHONE (OUTPATIENT)
Dept: CARDIOLOGY | Facility: CLINIC | Age: 46
End: 2021-12-20

## 2022-05-27 ENCOUNTER — HOSPITAL ENCOUNTER (OUTPATIENT)
Dept: CARDIOLOGY | Facility: HOSPITAL | Age: 47
Discharge: HOME | End: 2022-05-27

## 2022-05-27 ENCOUNTER — LAB (OUTPATIENT)
Dept: LAB | Facility: HOSPITAL | Age: 47
End: 2022-05-27

## 2022-05-27 PROCEDURE — 93005 ELECTROCARDIOGRAM TRACING: CPT | Performed by: PODIATRIST

## 2022-05-27 PROCEDURE — 85027 COMPLETE CBC AUTOMATED: CPT

## 2022-05-27 PROCEDURE — 93010 ELECTROCARDIOGRAM REPORT: CPT | Performed by: INTERNAL MEDICINE

## 2022-05-27 PROCEDURE — 80048 BASIC METABOLIC PNL TOTAL CA: CPT

## 2022-05-28 LAB
ANION GAP SERPL CALCULATED.3IONS-SCNC: 15 MMOL/L (ref 5–15)
BUN SERPL-MCNC: 4 MG/DL (ref 6–20)
BUN/CREAT SERPL: 7.1 (ref 7–25)
CALCIUM SPEC-SCNC: 9.4 MG/DL (ref 8.6–10.5)
CHLORIDE SERPL-SCNC: 97 MMOL/L (ref 98–107)
CO2 SERPL-SCNC: 21 MMOL/L (ref 22–29)
CREAT SERPL-MCNC: 0.56 MG/DL (ref 0.57–1)
DEPRECATED RDW RBC AUTO: 38.9 FL (ref 37–54)
EGFRCR SERPLBLD CKD-EPI 2021: 113.4 ML/MIN/1.73
ERYTHROCYTE [DISTWIDTH] IN BLOOD BY AUTOMATED COUNT: 12.7 % (ref 12.3–15.4)
GLUCOSE SERPL-MCNC: 280 MG/DL (ref 65–99)
HCT VFR BLD AUTO: 44.9 % (ref 34–46.6)
HGB BLD-MCNC: 14.9 G/DL (ref 12–15.9)
MCH RBC QN AUTO: 28 PG (ref 26.6–33)
MCHC RBC AUTO-ENTMCNC: 33.2 G/DL (ref 31.5–35.7)
MCV RBC AUTO: 84.4 FL (ref 79–97)
PLATELET # BLD AUTO: 233 10*3/MM3 (ref 140–450)
PMV BLD AUTO: 11.1 FL (ref 6–12)
POTASSIUM SERPL-SCNC: 4.1 MMOL/L (ref 3.5–5.2)
QT INTERVAL: 401 MS
RBC # BLD AUTO: 5.32 10*6/MM3 (ref 3.77–5.28)
SODIUM SERPL-SCNC: 133 MMOL/L (ref 136–145)
WBC NRBC COR # BLD: 5.65 10*3/MM3 (ref 3.4–10.8)

## 2022-06-01 ENCOUNTER — LAB (OUTPATIENT)
Dept: LAB | Facility: HOSPITAL | Age: 47
End: 2022-06-01

## 2022-06-01 LAB — SARS-COV-2 ORF1AB RESP QL NAA+PROBE: NOT DETECTED

## 2022-06-01 PROCEDURE — C9803 HOPD COVID-19 SPEC COLLECT: HCPCS

## 2022-06-01 PROCEDURE — U0004 COV-19 TEST NON-CDC HGH THRU: HCPCS

## 2022-06-01 NOTE — PAT
Pt notified of elevated blood sugar, advised her to decrease carbs and sugars, will re check it day of surgery.

## 2022-06-02 ENCOUNTER — ANESTHESIA EVENT (OUTPATIENT)
Dept: PERIOP | Facility: HOSPITAL | Age: 47
End: 2022-06-02

## 2022-06-02 NOTE — H&P
FOOT AND ANKLE SURGERY HISTORY AND PHYSICAL      Patient Identification:  Name: Constanza Mccall  Age: 47 y.o.  Sex: female  : 1975  MRN: [id number]      Chief Complaint: Chronic ulcer of the left foot 3rd toe.    History of Present Illness:  Constanza Mccall is a 47 y.o. female who presents today for surgery and amputation of the chronic ulcer of the left foot 3rd toe. Patient has failed  conservative treatments and elects to proceed with surgery at this time.  Patient has been NPO for the past 8 hours. Patient denies any nausea, vomiting, fever, chills.  No other changes from previously performed H&P performed in office, please see physician  notes as needed.      [facility]:  [facility]  Past Medical History:  Past Medical History:   Diagnosis Date   • Coronary artery disease    • Diabetes mellitus (HCC)    • History of echocardiogram 2018    EF 60% Normal LV diastolic filling parameters. There is normal LV wall thickness. Normla trileaflet aortic valve. Normal mitral valve. Normal LV/RV fucntion with no significant valvulopathy. Normla right anf left pressures estimated.    • Hyperlipidemia    • Hypertension    • Neuropathy    • Peripheral artery disease (HCC)      Past Surgical History:  Past Surgical History:   Procedure Laterality Date   • CARDIAC CATHETERIZATION N/A 10/15/2019    Procedure: Left Heart Cath;  Surgeon: Merrick Sarmiento MD;  Location: [facility] CATH INVASIVE LOCATION;  Service: Cardiology   • CHOLECYSTECTOMY     • OOPHORECTOMY       Home Meds:  No medications prior to admission.     Current Meds:  [unfilled]  Allergies:    No Known Allergies  Immunizations:  [unfilled]  Social History:  Social History     Tobacco Use   • Smoking status: Current Every Day Smoker     Packs/day: 2.50     Types: Cigarettes   • Smokeless tobacco: Never Used   • Tobacco comment: cut down quit none dos   Substance Use Topics   • Alcohol use: No     Family History:  Family History   Problem Relation Age  of Onset   • No Known Problems Mother    • No Known Problems Father          Review of Systems  Review of Systems - General ROS: negative for - chills, fatigue, malaise, weight gain or weight  loss  Endocrine ROS: negative for - hair pattern changes, hot flashes, malaise/lethargy, temperature  intolerance or unexpected weight changes  Respiratory ROS: negative for - cough, hemoptysis, shortness of breath, tachypnea or wheezing  Cardiovascular ROS: negative for - chest pain, dyspnea on exertion, irregular heartbeat, loss of  consciousness, murmur, palpitations, rapid heart rate or shortness of breath  Musculoskeletal ROS: Chronic ulcer of the left foot 3rd toe  Neurological ROS: negative for - behavioral changes, confusion, dizziness, gait disturbance,  seizures or weakness      Objective:    Vitals:  There were no vitals filed for this visit.    Exam:  General appearance: alert, appears stated age, cooperative and no distress  Head: Normocephalic, without obvious abnormality, atraumatic  Eyes: conjunctivae/corneas clear. PERRL.  Nose: Nares normal. Septum midline. No drainage or sinus tenderness.  Throat: lips, mucosa, and tongue normal; teeth and gums normal  Neck: supple, symmetrical, trachea midline    Back: symmetric, no curvature. ROM normal. No CVA tenderness.  Lungs: clear to auscultation bilaterally  Heart: regular rate and rhythm, S1, S2 normal, no murmur, click, rub or gallop  Abdomen:  soft, non-tender, no masses      A Lower Extremity Exam was performed  There have been no changes in physical examination since the preoperative examination in  office. Patient's neurovascular status is unchanged since last visit.  Chronic ulcer of the left foot 3rd toe to tendon and bone.      Assessment:  1. Chronic ulcer of the left foot 3rd toe.    Plan:  Reviewed findings and treatment options with patient at bedside.  Discussed procedure(s), along with risks, complications, and recovery with patient at length.  All  questions were answered to patient's satisfaction.  Plan for amputation or arthroplasty of the left 3rd toe.  Follow up with Dr. Heath post operatively in a week.

## 2022-06-03 ENCOUNTER — HOSPITAL ENCOUNTER (OUTPATIENT)
Facility: HOSPITAL | Age: 47
Setting detail: HOSPITAL OUTPATIENT SURGERY
Discharge: HOME | End: 2022-06-03
Attending: PODIATRIST | Admitting: PODIATRIST

## 2022-06-03 ENCOUNTER — ANESTHESIA (OUTPATIENT)
Dept: PERIOP | Facility: HOSPITAL | Age: 47
End: 2022-06-03

## 2022-06-03 VITALS
HEART RATE: 80 BPM | BODY MASS INDEX: 31.1 KG/M2 | DIASTOLIC BLOOD PRESSURE: 71 MMHG | HEIGHT: 69 IN | OXYGEN SATURATION: 97 % | TEMPERATURE: 98.2 F | WEIGHT: 210 LBS | RESPIRATION RATE: 20 BRPM | SYSTOLIC BLOOD PRESSURE: 122 MMHG

## 2022-06-03 DIAGNOSIS — M86.20: ICD-10-CM

## 2022-06-03 DIAGNOSIS — E08.00 DIABETES MELLITUS DUE TO UNDERLYING CONDITION WITH HYPEROSMOLARITY WITHOUT COMA, WITHOUT LONG-TERM CURRENT USE OF INSULIN: Primary | ICD-10-CM

## 2022-06-03 LAB
B-HCG UR QL: NEGATIVE
GLUCOSE BLDC GLUCOMTR-MCNC: 239 MG/DL (ref 70–105)
GLUCOSE BLDC GLUCOMTR-MCNC: 282 MG/DL (ref 70–105)

## 2022-06-03 PROCEDURE — 63710000001 INSULIN LISPRO (HUMAN) PER 5 UNITS: Performed by: PODIATRIST

## 2022-06-03 PROCEDURE — 25010000002 CEFAZOLIN PER 500 MG: Performed by: PODIATRIST

## 2022-06-03 PROCEDURE — 87070 CULTURE OTHR SPECIMN AEROBIC: CPT | Performed by: PODIATRIST

## 2022-06-03 PROCEDURE — 25010000002 FENTANYL CITRATE (PF) 50 MCG/ML SOLUTION: Performed by: NURSE ANESTHETIST, CERTIFIED REGISTERED

## 2022-06-03 PROCEDURE — 25010000002 ONDANSETRON PER 1 MG: Performed by: NURSE ANESTHETIST, CERTIFIED REGISTERED

## 2022-06-03 PROCEDURE — 88305 TISSUE EXAM BY PATHOLOGIST: CPT | Performed by: PODIATRIST

## 2022-06-03 PROCEDURE — 81025 URINE PREGNANCY TEST: CPT | Performed by: ANESTHESIOLOGY

## 2022-06-03 PROCEDURE — 87205 SMEAR GRAM STAIN: CPT | Performed by: PODIATRIST

## 2022-06-03 PROCEDURE — 82962 GLUCOSE BLOOD TEST: CPT

## 2022-06-03 PROCEDURE — 25010000002 PROPOFOL 200 MG/20ML EMULSION: Performed by: NURSE ANESTHETIST, CERTIFIED REGISTERED

## 2022-06-03 PROCEDURE — 87075 CULTR BACTERIA EXCEPT BLOOD: CPT | Performed by: PODIATRIST

## 2022-06-03 RX ORDER — INSULIN LISPRO 100 [IU]/ML
6 INJECTION, SOLUTION INTRAVENOUS; SUBCUTANEOUS ONCE
Status: COMPLETED | OUTPATIENT
Start: 2022-06-03 | End: 2022-06-03

## 2022-06-03 RX ORDER — ONDANSETRON 2 MG/ML
4 INJECTION INTRAMUSCULAR; INTRAVENOUS ONCE AS NEEDED
Status: DISCONTINUED | OUTPATIENT
Start: 2022-06-03 | End: 2022-06-03 | Stop reason: HOSPADM

## 2022-06-03 RX ORDER — SODIUM CHLORIDE, SODIUM LACTATE, POTASSIUM CHLORIDE, CALCIUM CHLORIDE 600; 310; 30; 20 MG/100ML; MG/100ML; MG/100ML; MG/100ML
9 INJECTION, SOLUTION INTRAVENOUS CONTINUOUS PRN
Status: DISCONTINUED | OUTPATIENT
Start: 2022-06-03 | End: 2022-06-03 | Stop reason: HOSPADM

## 2022-06-03 RX ORDER — EPHEDRINE SULFATE 5 MG/ML
INJECTION INTRAVENOUS AS NEEDED
Status: DISCONTINUED | OUTPATIENT
Start: 2022-06-03 | End: 2022-06-03 | Stop reason: SURG

## 2022-06-03 RX ORDER — PROPOFOL 10 MG/ML
INJECTION, EMULSION INTRAVENOUS AS NEEDED
Status: DISCONTINUED | OUTPATIENT
Start: 2022-06-03 | End: 2022-06-03 | Stop reason: SURG

## 2022-06-03 RX ORDER — SODIUM CHLORIDE 0.9 % (FLUSH) 0.9 %
10 SYRINGE (ML) INJECTION EVERY 12 HOURS SCHEDULED
Status: DISCONTINUED | OUTPATIENT
Start: 2022-06-03 | End: 2022-06-03 | Stop reason: HOSPADM

## 2022-06-03 RX ORDER — FENTANYL CITRATE 50 UG/ML
INJECTION, SOLUTION INTRAMUSCULAR; INTRAVENOUS AS NEEDED
Status: DISCONTINUED | OUTPATIENT
Start: 2022-06-03 | End: 2022-06-03 | Stop reason: SURG

## 2022-06-03 RX ORDER — SODIUM CHLORIDE, SODIUM LACTATE, POTASSIUM CHLORIDE, CALCIUM CHLORIDE 600; 310; 30; 20 MG/100ML; MG/100ML; MG/100ML; MG/100ML
INJECTION, SOLUTION INTRAVENOUS CONTINUOUS PRN
Status: DISCONTINUED | OUTPATIENT
Start: 2022-06-03 | End: 2022-06-03 | Stop reason: SURG

## 2022-06-03 RX ORDER — FENTANYL CITRATE 50 UG/ML
50 INJECTION, SOLUTION INTRAMUSCULAR; INTRAVENOUS
Status: DISCONTINUED | OUTPATIENT
Start: 2022-06-03 | End: 2022-06-03 | Stop reason: HOSPADM

## 2022-06-03 RX ORDER — SODIUM CHLORIDE 0.9 % (FLUSH) 0.9 %
10 SYRINGE (ML) INJECTION AS NEEDED
Status: DISCONTINUED | OUTPATIENT
Start: 2022-06-03 | End: 2022-06-03 | Stop reason: HOSPADM

## 2022-06-03 RX ORDER — MIDAZOLAM HYDROCHLORIDE 1 MG/ML
1 INJECTION INTRAMUSCULAR; INTRAVENOUS
Status: DISCONTINUED | OUTPATIENT
Start: 2022-06-03 | End: 2022-06-03 | Stop reason: HOSPADM

## 2022-06-03 RX ORDER — FENTANYL CITRATE 50 UG/ML
75 INJECTION, SOLUTION INTRAMUSCULAR; INTRAVENOUS
Status: DISCONTINUED | OUTPATIENT
Start: 2022-06-03 | End: 2022-06-03 | Stop reason: HOSPADM

## 2022-06-03 RX ORDER — BUPIVACAINE HYDROCHLORIDE 5 MG/ML
INJECTION, SOLUTION PERINEURAL AS NEEDED
Status: DISCONTINUED | OUTPATIENT
Start: 2022-06-03 | End: 2022-06-03 | Stop reason: HOSPADM

## 2022-06-03 RX ORDER — ONDANSETRON 2 MG/ML
INJECTION INTRAMUSCULAR; INTRAVENOUS AS NEEDED
Status: DISCONTINUED | OUTPATIENT
Start: 2022-06-03 | End: 2022-06-03 | Stop reason: SURG

## 2022-06-03 RX ORDER — DIPHENHYDRAMINE HYDROCHLORIDE 50 MG/ML
12.5 INJECTION INTRAMUSCULAR; INTRAVENOUS
Status: DISCONTINUED | OUTPATIENT
Start: 2022-06-03 | End: 2022-06-03 | Stop reason: HOSPADM

## 2022-06-03 RX ORDER — HYDRALAZINE HYDROCHLORIDE 20 MG/ML
5 INJECTION INTRAMUSCULAR; INTRAVENOUS
Status: DISCONTINUED | OUTPATIENT
Start: 2022-06-03 | End: 2022-06-03 | Stop reason: HOSPADM

## 2022-06-03 RX ORDER — LIDOCAINE HYDROCHLORIDE 10 MG/ML
INJECTION, SOLUTION EPIDURAL; INFILTRATION; INTRACAUDAL; PERINEURAL AS NEEDED
Status: DISCONTINUED | OUTPATIENT
Start: 2022-06-03 | End: 2022-06-03 | Stop reason: SURG

## 2022-06-03 RX ADMIN — EPHEDRINE SULFATE 10 MG: 5 INJECTION INTRAVENOUS at 08:58

## 2022-06-03 RX ADMIN — ONDANSETRON 4 MG: 2 INJECTION INTRAMUSCULAR; INTRAVENOUS at 08:51

## 2022-06-03 RX ADMIN — PROPOFOL 150 MG: 10 INJECTION, EMULSION INTRAVENOUS at 08:51

## 2022-06-03 RX ADMIN — INSULIN LISPRO 6 UNITS: 100 INJECTION, SOLUTION INTRAVENOUS; SUBCUTANEOUS at 08:17

## 2022-06-03 RX ADMIN — LIDOCAINE HYDROCHLORIDE 50 MG: 10 INJECTION, SOLUTION EPIDURAL; INFILTRATION; INTRACAUDAL; PERINEURAL at 08:51

## 2022-06-03 RX ADMIN — SODIUM CHLORIDE, SODIUM LACTATE, POTASSIUM CHLORIDE, AND CALCIUM CHLORIDE: .6; .31; .03; .02 INJECTION, SOLUTION INTRAVENOUS at 08:46

## 2022-06-03 RX ADMIN — SODIUM CHLORIDE, POTASSIUM CHLORIDE, SODIUM LACTATE AND CALCIUM CHLORIDE 9 ML/HR: 600; 310; 30; 20 INJECTION, SOLUTION INTRAVENOUS at 07:30

## 2022-06-03 RX ADMIN — FENTANYL CITRATE 50 MCG: 50 INJECTION, SOLUTION INTRAMUSCULAR; INTRAVENOUS at 08:51

## 2022-06-03 RX ADMIN — CEFAZOLIN 2 G: 2 INJECTION, POWDER, FOR SOLUTION INTRAMUSCULAR; INTRAVENOUS at 08:56

## 2022-06-03 NOTE — ANESTHESIA POSTPROCEDURE EVALUATION
Patient: Constanza Mccall    Procedure Summary     Date: 06/03/22 Room / Location: [facility] OR 06 / [facility] MAIN OR    Anesthesia Start: 0846 Anesthesia Stop: 0933    Procedure: INCISION AND DRAINAGE WITH AMPUTATION OF LEFT 3RD TOE (Left Foot) Diagnosis:       Subacute osteomyelitis (HCC)      (Subacute osteomyelitis (HCC) [M86.20])    Surgeons: Deonte Heath Jr., DPM Provider: Zach Castillo MD    Anesthesia Type: general ASA Status: 3          Anesthesia Type: general    Vitals  Vitals Value Taken Time   /63 06/03/22 1028   Temp 96.8 °F (36 °C) 06/03/22 1015   Pulse 65 06/03/22 1030   Resp 14 06/03/22 1028   SpO2 93 % 06/03/22 1030   Vitals shown include unvalidated device data.        Post Anesthesia Care and Evaluation    Patient location during evaluation: PACU  Patient participation: complete - patient participated  Level of consciousness: awake  Pain scale: See nurse's notes for pain score.  Pain management: adequate  Airway patency: patent  Anesthetic complications: No anesthetic complications  PONV Status: none  Cardiovascular status: acceptable  Respiratory status: acceptable  Hydration status: acceptable    Comments: Patient seen and examined postoperatively; vital signs stable; SpO2 greater than or equal to 90%; cardiopulmonary status stable; nausea/vomiting adequately controlled; pain adequately controlled; no apparent anesthesia complications; patient discharged from anesthesia care when discharge criteria were met

## 2022-06-03 NOTE — OP NOTE
AMPUTATION DIGIT  Procedure Report    Patient Name:  Constanza Mccall  YOB: 1975    Date of Surgery:  6/3/2022     Indications: Patient with longstanding wound to the plantar aspect of the left third digit that probes to bone likely indicating chronic osteomyelitis complicated by vascular disease.    Pre-op Diagnosis:   Subacute osteomyelitis (HCC) [M86.20]       Post-Op Diagnosis Codes:     * Subacute osteomyelitis (HCC) [M86.20]    Procedure/CPT® Codes:      Procedure(s):  INCISION AND DRAINAGE WITH AMPUTATION OF LEFT 3RD TOE    Staff:  Surgeon(s):  Deonte Heath Jr., DPM         Resident: Jama Guardado DPM PGY2    Anesthesiologist: Zach Castillo MD    Anesthesia: Monitored Anesthesia Care with Regional    Hemostasis: None    Estimated Blood Loss: Less than 10    Implants:    Nothing was implanted during the procedure        Findings: No gentry purulence was appreciated.  Bone at the amputation site was in good quality and free of disease.    Complications: None    Description of Procedure: complaining of longstanding wound to the plantar aspect of the left third toe with visible tendon and probes to bone.  The patient has failed conservative measures which include: Local wound care, padding, inserts, off-loading, and change in shoe gear.  The patient has requested surgical intervention for the problem at this time.       History and Physical was performed.  The risks and benefits were discussed in detail with the patient.  The patient understands the procedure to be performed today as well as the potential risks and complications that are involved, which include, but are not limited to:  post-operative infection, complications with anesthesia, need for further surgery, wound dehiscence, DVT.  No guarantees or assurances have been given or implied at this time.      Under IV sedation the patient was brought to the operating room and placed on the operative table in the supine position.    Pre-operative local anesthetic block of 10 cc's of a 1:1 mixture of 1% Lidocaine plain and 0.5% marcaine plain was performed.  The foot was then scrubbed, prepped, and draped in the usual sterile manner.  T    Procedure:     Attention was then directed to the left foot third digit where a full-thickness incision was made to the base of the third toe and a racquet style fashion utilizing a 15 blade.  The third toe was then disarticulated at the metatarsal phalangeal joint.  Appropriate cultures were taken at this time.  The toe was then transferred to the back table to be sent as pathology specimen.  The operative site was flushed with normal sterile saline.  Deep tissues were approximated using 3-0 Vicryl interrupted suture.  The skin was then closed with 3-0 nylon interrupted suture.     Dry sterile dressings were then applied to the left foot and ankle.   The patient tolerated the procedure and anesthesia well.  The patient was transferred from the operating room to the recovery room with vital signs stable and neurovascular status intact to the left lower extremity.     Jama Guardado DPM PGY2    Date: 6/3/2022  Time: 09:29 EDT

## 2022-06-03 NOTE — ANESTHESIA PREPROCEDURE EVALUATION
Anesthesia Evaluation     Patient summary reviewed and Nursing notes reviewed   NPO Solid Status: > 8 hours  NPO Liquid Status: > 8 hours           Airway   Mallampati: II  TM distance: >3 FB  Neck ROM: full  No difficulty expected  Dental - normal exam     Pulmonary    (+) a smoker Current Smoked day of surgery,   Cardiovascular     (+) hypertension, CAD, PVD, hyperlipidemia,       Neuro/Psych  GI/Hepatic/Renal/Endo    (+)   diabetes mellitus,     Musculoskeletal     Abdominal    Substance History      OB/GYN          Other        ROS/Med Hx Other:  Normal epicardial coronary anatomy with likely obstructive disease and a small caliber diagonal branch at 70% followed by inferior limb ostial 80% small caliber vessel disease.  2.  No significant LAD stenosis, no significant RCA disease, no significant circumflex disease other than possible  of the small caliber OM1 proximally.  3.  Normal LV function  4.  Normal LVEDP  5.  No significant transaortic valve gradient  6.  Significant obstructive PAD which is new since prior encounter with ostial left iliac stenosis as well as small size and diffusely diseased right external iliac without focal stenosis.                  Anesthesia Plan    ASA 3     general     intravenous induction     Anesthetic plan, all risks, benefits, and alternatives have been provided, discussed and informed consent has been obtained with: patient.    Plan discussed with CRNA and CAA.        CODE STATUS:

## 2022-06-06 LAB
BACTERIA SPEC AEROBE CULT: NORMAL
GRAM STN SPEC: NORMAL
GRAM STN SPEC: NORMAL
LAB AP CASE REPORT: NORMAL
PATH REPORT.FINAL DX SPEC: NORMAL
PATH REPORT.GROSS SPEC: NORMAL

## 2022-06-08 LAB — BACTERIA SPEC ANAEROBE CULT: NORMAL

## 2022-07-15 DIAGNOSIS — I73.9 PVD (PERIPHERAL VASCULAR DISEASE): ICD-10-CM

## 2022-07-15 RX ORDER — CLOPIDOGREL BISULFATE 75 MG/1
75 TABLET ORAL DAILY
Qty: 30 TABLET | Refills: 5 | Status: SHIPPED | OUTPATIENT
Start: 2022-07-15

## 2022-07-15 RX ORDER — ATORVASTATIN CALCIUM 40 MG/1
40 TABLET, FILM COATED ORAL NIGHTLY
Qty: 30 TABLET | Refills: 5 | Status: SHIPPED | OUTPATIENT
Start: 2022-07-15

## 2022-07-15 RX ORDER — LISINOPRIL 10 MG/1
5 TABLET ORAL DAILY
Qty: 15 TABLET | Refills: 5 | Status: SHIPPED | OUTPATIENT
Start: 2022-07-15

## 2022-07-15 NOTE — TELEPHONE ENCOUNTER
Caller: Constanza Mccall    Relationship: Self    Best call back number: [phone]    Requested Prescriptions:   Requested Prescriptions     Pending Prescriptions Disp Refills   • Rivaroxaban (Xarelto) 2.5 MG tablet 60 tablet 5     Sig: Take 1 tablet by mouth 2 (Two) Times a Day With Meals.   • lisinopril (PRINIVIL,ZESTRIL) 10 MG tablet 15 tablet 5     Sig: Take 0.5 tablets by mouth Daily.   • atorvastatin (LIPITOR) 40 MG tablet 30 tablet 5     Sig: Take 1 tablet by mouth Every Night.   • clopidogrel (PLAVIX) 75 MG tablet 30 tablet 5     Sig: Take 1 tablet by mouth Daily.        [facility]    Additional details provided by patient: OUT OF ALL MEDS    Does the patient have less than a 3 day supply:  [x] Yes  [] No

## 2022-11-01 ENCOUNTER — TELEPHONE (OUTPATIENT)
Dept: CARDIOLOGY | Facility: CLINIC | Age: 47
End: 2022-11-01

## 2022-11-01 NOTE — TELEPHONE ENCOUNTER
Caller: Constanza Mccall    Relationship: Self    Best call back number:[phone]    What is the best time to reach you: ANYTIME    What was the call regarding: PATIENT'S  PASSED AWAY ON SATURDAY AND SHE DOES NOT HAVE TRANSPORTATION TO HER UPCOMING APPOINTMENT. WITH HIS PASSING SHE ALSO LOST HER HEALTH INSURANCE AND WILL HAVE DIFFICULTY PAYING FOR HER MEDICATIONS OUT OF POCKET.    PATIENT IS OUT OF XARELTO AND ONE OTHER MEDICATION. SHE IS ALMOST OUT OF THE OTHER TWO.   Do you require a callback: YES

## 2023-09-22 ENCOUNTER — HOSPITAL ENCOUNTER (OUTPATIENT)
Dept: GENERAL RADIOLOGY | Facility: HOSPITAL | Age: 48
Discharge: HOME | End: 2023-09-22

## 2023-09-22 ENCOUNTER — TRANSCRIBE ORDERS (OUTPATIENT)
Dept: ADMINISTRATIVE | Facility: HOSPITAL | Age: 48
End: 2023-09-22
Payer: COMMERCIAL

## 2023-09-22 DIAGNOSIS — Z02.71 ENCOUNTER FOR DISABILITY DETERMINATION: ICD-10-CM

## 2023-09-22 DIAGNOSIS — Z02.71 ENCOUNTER FOR DISABILITY DETERMINATION: Primary | ICD-10-CM

## 2023-09-22 PROCEDURE — 73560 X-RAY EXAM OF KNEE 1 OR 2: CPT

## 2023-09-22 PROCEDURE — 72040 X-RAY EXAM NECK SPINE 2-3 VW: CPT

## 2023-09-27 ENCOUNTER — TELEPHONE (OUTPATIENT)
Dept: CARDIOLOGY | Facility: CLINIC | Age: 48
End: 2023-09-27

## 2023-09-27 NOTE — TELEPHONE ENCOUNTER
Caller: Constanza Mccall    Relationship to patient: Self    Best call back number: [phone]    Chief complaint: PT LOOKING TO SCHEDULE A FOLLOW UP. LAST SEEN 2021. NEED ADVISEMENT ON SCHEDULING.     Type of visit: FOLLOW UP     Requested date: ASAP

## 2023-10-09 RX ORDER — HYDROCODONE BITARTRATE AND ACETAMINOPHEN 5; 325 MG/1; MG/1
TABLET ORAL
COMMUNITY
Start: 2023-06-13 | End: 2023-10-12

## 2023-10-09 RX ORDER — CETIRIZINE HYDROCHLORIDE 10 MG/1
TABLET ORAL
COMMUNITY
Start: 2023-06-17 | End: 2023-10-12

## 2023-10-09 RX ORDER — CHOLECALCIFEROL (VITAMIN D3) 1250 MCG
CAPSULE ORAL
COMMUNITY
Start: 2023-06-22

## 2023-10-12 ENCOUNTER — OFFICE VISIT (OUTPATIENT)
Dept: CARDIOLOGY | Facility: CLINIC | Age: 48
End: 2023-10-12
Payer: MEDICAID

## 2023-10-12 ENCOUNTER — TELEPHONE (OUTPATIENT)
Dept: CARDIOLOGY | Facility: CLINIC | Age: 48
End: 2023-10-12

## 2023-10-12 VITALS
SYSTOLIC BLOOD PRESSURE: 100 MMHG | WEIGHT: 187 LBS | DIASTOLIC BLOOD PRESSURE: 63 MMHG | RESPIRATION RATE: 18 BRPM | OXYGEN SATURATION: 97 % | BODY MASS INDEX: 27.7 KG/M2 | HEIGHT: 69 IN | HEART RATE: 75 BPM

## 2023-10-12 DIAGNOSIS — I73.9 PVD (PERIPHERAL VASCULAR DISEASE): ICD-10-CM

## 2023-10-12 DIAGNOSIS — R07.9 CHEST PAIN, UNSPECIFIED TYPE: Primary | ICD-10-CM

## 2023-10-12 DIAGNOSIS — I25.708 CORONARY ARTERY DISEASE OF BYPASS GRAFT OF NATIVE HEART WITH STABLE ANGINA PECTORIS: ICD-10-CM

## 2023-10-12 DIAGNOSIS — R06.09 DOE (DYSPNEA ON EXERTION): ICD-10-CM

## 2023-10-12 PROCEDURE — 93000 ELECTROCARDIOGRAM COMPLETE: CPT | Performed by: NURSE PRACTITIONER

## 2023-10-12 PROCEDURE — 99214 OFFICE O/P EST MOD 30 MIN: CPT | Performed by: NURSE PRACTITIONER

## 2023-10-12 RX ORDER — CLOPIDOGREL BISULFATE 75 MG/1
75 TABLET ORAL DAILY
Qty: 30 TABLET | Refills: 5 | Status: SHIPPED | OUTPATIENT
Start: 2023-10-12 | End: 2023-10-12 | Stop reason: SDUPTHER

## 2023-10-12 RX ORDER — ATORVASTATIN CALCIUM 40 MG/1
40 TABLET, FILM COATED ORAL NIGHTLY
Qty: 30 TABLET | Refills: 5 | Status: SHIPPED | OUTPATIENT
Start: 2023-10-12

## 2023-10-12 RX ORDER — CLOPIDOGREL BISULFATE 75 MG/1
75 TABLET ORAL DAILY
Qty: 30 TABLET | Refills: 5 | Status: SHIPPED | OUTPATIENT
Start: 2023-10-12

## 2023-10-12 RX ORDER — ATORVASTATIN CALCIUM 40 MG/1
40 TABLET, FILM COATED ORAL NIGHTLY
Qty: 30 TABLET | Refills: 5 | Status: SHIPPED | OUTPATIENT
Start: 2023-10-12 | End: 2023-10-12 | Stop reason: SDUPTHER

## 2023-10-12 NOTE — TELEPHONE ENCOUNTER
Caller: Constanza Mccall    Relationship: Self    Best call back number: [phone]    What is the best time to reach you: ANYTIME    Who are you requesting to speak with (clinical staff, provider,) CLINICAL    What was the call regarding: PT IS CALLING TO HAVE MEDS THAT HERBERT ARTIS SENT IN TODAY TO [facility] IN [employer] [facility] ON [address] [place]. SHE SAID THAT [facility] NO LONGER ACCEPTS HER INSURANCE. PT IS UNSURE OF THE MEDICATIONS    Is it okay if the provider responds through MyChart: NO

## 2023-10-12 NOTE — PROGRESS NOTES
Cardiology Office Follow Up Visit      Primary Care Provider:  Reyes Masterson MD    Reason for f/u:     Chest pain  Dyspnea with exertion  CAD  Peripheral vascular disease  Tobacco abuse  Nonadherence to medical therapy      Subjective     CC:    Periods of chest pain and shortness of breath    History of Present Illness       Constanza Mccall is a 48 y.o. female.  Patient is a pleasant 48-year-old female who is routinely followed by Dr. Sarmiento.  She has not been in the office in over 2 years.    She presents today for follow-up informing me that she has been out of all of her medications since June of this year.  She has not been taking anything.    Earlier this year in April the patient had left below the knee amputation at [facility].  She was discharged to rehab.  Since rehab she has been living with someone in her family.    She reports that she had a prosthetic for her left lower extremity but has not been able to wear it because of some swelling in her stump.  She has not been having outpatient physical therapy.  She reports she does not currently have a primary care provider.    The patient reports she has some periods of chest discomfort and shortness of breath with exertion.  She has had no palpitations or feelings of her heart racing.    Patient's past medical history is significant for coronary artery disease and peripheral vascular disease.  In 2019 the patient had a cardiac catheterization that showed obstructive disease in a small caliber diagonal branch that was not amenable to PCI.  She was thought to also have a  of a small caliber OM1 that was also not amenable to PCI.  No significant LAD stenosis, no significant RCA stenosis detected.  There is normal LV function.    Patient has significant peripheral vascular disease has been noted to have ostial left iliac stenosis and diffusely diseased right external area iliac disease without focal stenosis.    She has had previous  revascularization attempts of the left lower extremity prior to her amputation    She continues to smoke cigarettes.    The importance of adherence to medical therapy and follow-up has been stressed with the patient      ASSESSMENT/PLAN:      Diagnoses and all orders for this visit:    1. Chest pain, unspecified type (Primary)  -     Adult Transthoracic Echo Complete W/ Cont if Necessary Per Protocol; Future  -     Stress Test With Myocardial Perfusion (1 Day); Future  -     CBC & Differential; Future  -     Comprehensive Metabolic Panel; Future  -     Lipid Panel; Future    2. PVD (peripheral vascular disease)  -     clopidogrel (PLAVIX) 75 MG tablet; Take 1 tablet by mouth Daily.  Dispense: 30 tablet; Refill: 5    3. Coronary artery disease of bypass graft of native heart with stable angina pectoris  -     Adult Transthoracic Echo Complete W/ Cont if Necessary Per Protocol; Future  -     Stress Test With Myocardial Perfusion (1 Day); Future    4. NIXON (dyspnea on exertion)    Other orders  -     atorvastatin (LIPITOR) 40 MG tablet; Take 1 tablet by mouth Every Night.  Dispense: 30 tablet; Refill: 5  -     Rivaroxaban (Xarelto) 2.5 MG tablet; Take 1 tablet by mouth 2 (Two) Times a Day With Meals.  Dispense: 60 tablet; Refill: 5            MEDICAL DECISION MAKING:    Patient is not taking any medications currently.  I have ordered some baseline labs including CBC, CMP and lipid panel.  I would like to resume her Plavix, Xarelto and Lipitor.    I am not resuming lisinopril at this time due to her blood pressure being marginal.    In addition I have advised the patient that she should obtain a primary care provider soon as possible.  I have given her the name of the [facility] for primary care so she can try to obtain a new provider.  In addition she is also been advised to follow-up with vascular surgery.    In addition to her complaints of chest pain and shortness of breath I would like to proceed with  an echocardiogram to reassess LV function and valvular status as well as a Lexiscan Myoview to rule out an ischemic burden.    The patient's been advised if she should have chest pain that is severe in nature that she should report to the emergency room for evaluation.    I have scheduled the patient for follow-up with Dr. Sarmiento after these test to review.    The patient is been counseled in the importance of adherence to medical therapy, follow-up in absolute tobacco cessation has been discussed.  The patient is tearful reports since her last visit here her  passed away.  She has had a lot of social stressors at home with her living arrangements and her son who is causing her to have some problems as well        Past Medical History:   Diagnosis Date    Coronary artery disease     Diabetes mellitus     History of echocardiogram 12/17/2018    EF 60% Normal LV diastolic filling parameters. There is normal LV wall thickness. Normla trileaflet aortic valve. Normal mitral valve. Normal LV/RV fucntion with no significant valvulopathy. Normla right anf left pressures estimated.     Hyperlipidemia     Hypertension     Neuropathy     Peripheral artery disease        Past Surgical History:   Procedure Laterality Date    AMPUTATION DIGIT Left 6/3/2022    Procedure: INCISION AND DRAINAGE WITH AMPUTATION OF LEFT 3RD TOE;  Surgeon: Deonte Heath Jr., DPM;  Location: [facility] MAIN OR;  Service: Podiatry;  Laterality: Left;    CARDIAC CATHETERIZATION N/A 10/15/2019    Procedure: Left Heart Cath;  Surgeon: Merrick Sarmiento MD;  Location: [facility] CATH INVASIVE LOCATION;  Service: Cardiology    CHOLECYSTECTOMY      OOPHORECTOMY           Current Outpatient Medications:     albuterol sulfate  (90 Base) MCG/ACT inhaler, Inhale 2 puffs Every 4 (Four) Hours As Needed., Disp: , Rfl:     atorvastatin (LIPITOR) 40 MG tablet, Take 1 tablet by mouth Every Night., Disp: 30 tablet, Rfl: 5    Cholecalciferol (Vitamin D3)  1.25 MG (99399 UT) capsule, , Disp: , Rfl:     clopidogrel (PLAVIX) 75 MG tablet, Take 1 tablet by mouth Daily., Disp: 30 tablet, Rfl: 5    lisinopril (PRINIVIL,ZESTRIL) 10 MG tablet, Take 0.5 tablets by mouth Daily., Disp: 15 tablet, Rfl: 5    Rivaroxaban (Xarelto) 2.5 MG tablet, Take 1 tablet by mouth 2 (Two) Times a Day With Meals., Disp: 60 tablet, Rfl: 5    gabapentin (NEURONTIN) 800 MG tablet, Take 800 mg by mouth 2 (Two) Times a Day. (Patient not taking: Reported on 10/12/2023), Disp: , Rfl:     Insulin Glargine (LANTUS SOLOSTAR) 100 UNIT/ML injection pen, Inject 20 Units under the skin into the appropriate area as directed Every Night. Has been out is ordered (Patient not taking: Reported on 10/12/2023), Disp: , Rfl:     metFORMIN (GLUCOPHAGE) 1000 MG tablet, Take 1,000 mg by mouth 2 (Two) Times a Day With Meals. (Patient not taking: Reported on 10/12/2023), Disp: , Rfl:     Social History     Socioeconomic History    Marital status:    Tobacco Use    Smoking status: Every Day     Packs/day: 1     Types: Cigarettes    Smokeless tobacco: Never    Tobacco comments:     cut down quit none dos   Vaping Use    Vaping Use: Never used   Substance and Sexual Activity    Alcohol use: No    Drug use: No    Sexual activity: Defer       Family History   Problem Relation Age of Onset    No Known Problems Mother     No Known Problems Father        The following portions of the patient's history were reviewed and updated as appropriate: allergies, current medications, past family history, past medical history, past social history, past surgical history and problem list.    Review of Systems   Constitutional: Positive for malaise/fatigue.   Cardiovascular:  Positive for chest pain and dyspnea on exertion.   Respiratory:  Positive for shortness of breath and sleep disturbances due to breathing.    Neurological:  Positive for weakness.   All other systems reviewed and are negative.      Pertinent items are noted in  "HPI, all other systems reviewed and negative    /63 (BP Location: Left arm, Patient Position: Sitting, Cuff Size: Large Adult)   Pulse 75   Resp 18   Ht 175.3 cm (69\")   Wt 84.8 kg (187 lb)   LMP  (LMP Unknown)   SpO2 97%   BMI 27.62 kg/mý .  Objective     Vitals reviewed.   Constitutional:       General: Not in acute distress.     Appearance: Normal appearance. Well-developed.   Eyes:      Pupils: Pupils are equal, round, and reactive to light.   HENT:      Head: Normocephalic and atraumatic.   Neck:      Vascular: No JVD.   Pulmonary:      Effort: Pulmonary effort is normal.      Breath sounds: Normal breath sounds.   Cardiovascular:      Normal rate. Regular rhythm.      Comments: Left below the knee amputation  Edema:     Peripheral edema absent.   Abdominal:      General: There is no distension.      Palpations: Abdomen is soft.      Tenderness: There is no abdominal tenderness.   Musculoskeletal: Normal range of motion.      Cervical back: Normal range of motion and neck supple. Skin:     General: Skin is warm and dry.   Neurological:      Mental Status: Alert and oriented to person, place, and time.             ECG 12 Lead    Date/Time: 10/12/2023 12:42 PM  Performed by: Sussy Timmons APRN    Authorized by: Sussy Timmons APRN  Comparison: compared with previous ECG from 10/12/2023  Similar to previous ECG  Rhythm: sinus rhythm  Rate: normal  BPM: 75  Conduction: conduction normal  ST Segments: ST segments normal  T Waves: T waves normal  QRS axis: normal  Other findings: low voltage    Clinical impression: non-specific ECG          EKG ordered by and reviewed by me in office                      "

## 2023-12-15 ENCOUNTER — HOSPITAL ENCOUNTER (OUTPATIENT)
Dept: NUCLEAR MEDICINE | Facility: HOSPITAL | Age: 48
Discharge: HOME | End: 2023-12-15
Payer: MEDICAID

## 2023-12-15 ENCOUNTER — HOSPITAL ENCOUNTER (OUTPATIENT)
Dept: CARDIOLOGY | Facility: HOSPITAL | Age: 48
Discharge: HOME | End: 2023-12-15
Payer: MEDICAID

## 2023-12-15 DIAGNOSIS — I25.708 CORONARY ARTERY DISEASE OF BYPASS GRAFT OF NATIVE HEART WITH STABLE ANGINA PECTORIS: ICD-10-CM

## 2023-12-15 DIAGNOSIS — R07.9 CHEST PAIN, UNSPECIFIED TYPE: ICD-10-CM

## 2023-12-15 PROCEDURE — A9502 TC99M TETROFOSMIN: HCPCS | Performed by: NURSE PRACTITIONER

## 2023-12-15 PROCEDURE — 93306 TTE W/DOPPLER COMPLETE: CPT

## 2023-12-15 PROCEDURE — 0 TECHNETIUM TETROFOSMIN KIT: Performed by: NURSE PRACTITIONER

## 2023-12-15 PROCEDURE — 78452 HT MUSCLE IMAGE SPECT MULT: CPT

## 2023-12-15 PROCEDURE — 25010000002 REGADENOSON 0.4 MG/5ML SOLUTION: Performed by: NURSE PRACTITIONER

## 2023-12-15 PROCEDURE — 93017 CV STRESS TEST TRACING ONLY: CPT

## 2023-12-15 RX ORDER — REGADENOSON 0.08 MG/ML
0.4 INJECTION, SOLUTION INTRAVENOUS
Status: COMPLETED | OUTPATIENT
Start: 2023-12-15 | End: 2023-12-15

## 2023-12-15 RX ADMIN — REGADENOSON 0.4 MG: 0.08 INJECTION, SOLUTION INTRAVENOUS at 13:33

## 2023-12-15 RX ADMIN — TETROFOSMIN 1 DOSE: 1.38 INJECTION, POWDER, LYOPHILIZED, FOR SOLUTION INTRAVENOUS at 12:57

## 2023-12-15 RX ADMIN — TETROFOSMIN 1 DOSE: 1.38 INJECTION, POWDER, LYOPHILIZED, FOR SOLUTION INTRAVENOUS at 13:33

## 2023-12-16 LAB
BH CV ECHO LEFT VENTRICLE GLOBAL LONGITUDINAL STRAIN: -14.5 %
BH CV ECHO MEAS - AO MAX PG: 4.8 MMHG
BH CV ECHO MEAS - AO MEAN PG: 2 MMHG
BH CV ECHO MEAS - AO V2 MAX: 109 CM/SEC
BH CV ECHO MEAS - AO V2 VTI: 19.3 CM
BH CV ECHO MEAS - AVA(I,D): 3.1 CM2
BH CV ECHO MEAS - EDV(CUBED): 42.9 ML
BH CV ECHO MEAS - EDV(MOD-SP4): 69.7 ML
BH CV ECHO MEAS - EF(MOD-SP4): 70 %
BH CV ECHO MEAS - ESV(CUBED): 8 ML
BH CV ECHO MEAS - ESV(MOD-SP4): 20.9 ML
BH CV ECHO MEAS - FS: 42.9 %
BH CV ECHO MEAS - IVS/LVPW: 0.82 CM
BH CV ECHO MEAS - IVSD: 0.9 CM
BH CV ECHO MEAS - LA DIMENSION: 3.4 CM
BH CV ECHO MEAS - LAT PEAK E' VEL: 13.5 CM/SEC
BH CV ECHO MEAS - LV MASS(C)D: 103.4 GRAMS
BH CV ECHO MEAS - LV MAX PG: 3.5 MMHG
BH CV ECHO MEAS - LV MEAN PG: 2 MMHG
BH CV ECHO MEAS - LV V1 MAX: 93.8 CM/SEC
BH CV ECHO MEAS - LV V1 VTI: 17.5 CM
BH CV ECHO MEAS - LVIDD: 3.5 CM
BH CV ECHO MEAS - LVIDS: 2 CM
BH CV ECHO MEAS - LVOT AREA: 3.5 CM2
BH CV ECHO MEAS - LVOT DIAM: 2.1 CM
BH CV ECHO MEAS - LVPWD: 1.1 CM
BH CV ECHO MEAS - MED PEAK E' VEL: 6.4 CM/SEC
BH CV ECHO MEAS - MV A MAX VEL: 88.6 CM/SEC
BH CV ECHO MEAS - MV DEC SLOPE: 408 CM/SEC2
BH CV ECHO MEAS - MV DEC TIME: 0.21 SEC
BH CV ECHO MEAS - MV E MAX VEL: 71.3 CM/SEC
BH CV ECHO MEAS - MV E/A: 0.8
BH CV ECHO MEAS - MV MAX PG: 3.7 MMHG
BH CV ECHO MEAS - MV MEAN PG: 2 MMHG
BH CV ECHO MEAS - MV P1/2T: 66.5 MSEC
BH CV ECHO MEAS - MV V2 VTI: 25.2 CM
BH CV ECHO MEAS - MVA(P1/2T): 3.3 CM2
BH CV ECHO MEAS - MVA(VTI): 2.41 CM2
BH CV ECHO MEAS - PA V2 MAX: 92.8 CM/SEC
BH CV ECHO MEAS - SV(LVOT): 60.6 ML
BH CV ECHO MEAS - SV(MOD-SP4): 48.8 ML
BH CV ECHO MEASUREMENTS AVERAGE E/E' RATIO: 7.17
BH CV REST NUCLEAR ISOTOPE DOSE: 10.3 MCI
BH CV STRESS BP STAGE 1: NORMAL
BH CV STRESS BP STAGE 2: NORMAL
BH CV STRESS COMMENTS STAGE 1: NORMAL
BH CV STRESS COMMENTS STAGE 2: NORMAL
BH CV STRESS DOSE REGADENOSON STAGE 1: 0.4
BH CV STRESS DURATION MIN STAGE 1: 0
BH CV STRESS DURATION MIN STAGE 2: 4
BH CV STRESS DURATION SEC STAGE 1: 10
BH CV STRESS DURATION SEC STAGE 2: 0
BH CV STRESS HR STAGE 1: 81
BH CV STRESS HR STAGE 2: 93
BH CV STRESS NUCLEAR ISOTOPE DOSE: 31.1 MCI
BH CV STRESS PROTOCOL 1: NORMAL
BH CV STRESS RECOVERY BP: NORMAL MMHG
BH CV STRESS RECOVERY HR: 102 BPM
BH CV STRESS STAGE 1: 1
BH CV STRESS STAGE 2: 2
LV EF NUC BP: 70 %
MAXIMAL PREDICTED HEART RATE: 172 BPM
PERCENT MAX PREDICTED HR: 65.12 %
SINUS: 2.8 CM
STRESS BASELINE BP: NORMAL MMHG
STRESS BASELINE HR: 81 BPM
STRESS PERCENT HR: 77 %
STRESS POST PEAK BP: NORMAL MMHG
STRESS POST PEAK HR: 112 BPM
STRESS TARGET HR: 146 BPM

## 2023-12-18 ENCOUNTER — TELEPHONE (OUTPATIENT)
Dept: CARDIOLOGY | Facility: CLINIC | Age: 48
End: 2023-12-18

## 2023-12-18 NOTE — TELEPHONE ENCOUNTER
Caller: Constanza Mccall    Relationship: Self    Best call back number: [phone]    What is the best time to reach you: ANY    Who are you requesting to speak with (clinical staff, provider,  specific staff member): ANY        What was the call regarding: PATIENT RETURNING CALL ABOUT TEST RESULTS, PLEASE CALL BACK.

## 2023-12-22 ENCOUNTER — OFFICE VISIT (OUTPATIENT)
Dept: FAMILY MEDICINE CLINIC | Facility: CLINIC | Age: 48
End: 2023-12-22
Payer: MEDICAID

## 2023-12-22 ENCOUNTER — PATIENT ROUNDING (BHMG ONLY) (OUTPATIENT)
Dept: FAMILY MEDICINE CLINIC | Facility: CLINIC | Age: 48
End: 2023-12-22
Payer: MEDICAID

## 2023-12-22 ENCOUNTER — LAB (OUTPATIENT)
Dept: LAB | Facility: HOSPITAL | Age: 48
End: 2023-12-22
Payer: MEDICAID

## 2023-12-22 VITALS
HEART RATE: 88 BPM | HEIGHT: 69 IN | DIASTOLIC BLOOD PRESSURE: 69 MMHG | WEIGHT: 177 LBS | OXYGEN SATURATION: 99 % | BODY MASS INDEX: 26.22 KG/M2 | TEMPERATURE: 98.7 F | SYSTOLIC BLOOD PRESSURE: 103 MMHG

## 2023-12-22 DIAGNOSIS — Z76.89 ENCOUNTER TO ESTABLISH CARE: Primary | ICD-10-CM

## 2023-12-22 DIAGNOSIS — F32.1 MAJOR DEPRESSIVE DISORDER, SINGLE EPISODE, MODERATE: ICD-10-CM

## 2023-12-22 DIAGNOSIS — E11.8 TYPE 2 DIABETES MELLITUS WITH COMPLICATION, WITHOUT LONG-TERM CURRENT USE OF INSULIN: ICD-10-CM

## 2023-12-22 DIAGNOSIS — I1A.0 RESISTANT HYPERTENSION: ICD-10-CM

## 2023-12-22 DIAGNOSIS — E78.2 MIXED HYPERLIPIDEMIA: ICD-10-CM

## 2023-12-22 PROBLEM — G62.9 NEUROPATHY: Status: ACTIVE | Noted: 2017-06-13

## 2023-12-22 PROBLEM — Z83.3 FAMILY HISTORY OF DIABETES MELLITUS: Status: ACTIVE | Noted: 2023-12-22

## 2023-12-22 PROBLEM — R20.0 NUMBNESS AND TINGLING SENSATION OF SKIN: Status: RESOLVED | Noted: 2017-06-14 | Resolved: 2023-12-22

## 2023-12-22 PROBLEM — L97.509 ULCER OF TOE: Status: ACTIVE | Noted: 2018-12-26

## 2023-12-22 PROBLEM — I73.9 PERIPHERAL VASCULAR DISEASE: Status: ACTIVE | Noted: 2018-12-26

## 2023-12-22 PROBLEM — R20.0 NUMBNESS AND TINGLING SENSATION OF SKIN: Status: ACTIVE | Noted: 2017-06-14

## 2023-12-22 PROBLEM — R21 RASH OF HANDS: Status: RESOLVED | Noted: 2017-06-13 | Resolved: 2023-12-22

## 2023-12-22 PROBLEM — I70.209 SUPERFICIAL FEMORAL ARTERY OCCLUSION: Status: ACTIVE | Noted: 2019-09-12

## 2023-12-22 PROBLEM — I10 HYPERTENSION: Status: ACTIVE | Noted: 2023-12-22

## 2023-12-22 PROBLEM — R20.2 NUMBNESS AND TINGLING SENSATION OF SKIN: Status: ACTIVE | Noted: 2017-06-14

## 2023-12-22 PROBLEM — E78.5 HYPERLIPIDEMIA: Status: ACTIVE | Noted: 2023-12-22

## 2023-12-22 PROBLEM — R20.2 NUMBNESS AND TINGLING SENSATION OF SKIN: Status: RESOLVED | Noted: 2017-06-14 | Resolved: 2023-12-22

## 2023-12-22 PROBLEM — R21 RASH OF HANDS: Status: ACTIVE | Noted: 2017-06-13

## 2023-12-22 LAB
ALBUMIN SERPL-MCNC: 4.4 G/DL (ref 3.5–5.2)
ALBUMIN UR-MCNC: <1.2 MG/DL
ALBUMIN/GLOB SERPL: 1.6 G/DL
ALP SERPL-CCNC: 70 U/L (ref 39–117)
ALT SERPL W P-5'-P-CCNC: 11 U/L (ref 1–33)
ANION GAP SERPL CALCULATED.3IONS-SCNC: 9 MMOL/L (ref 5–15)
AST SERPL-CCNC: 9 U/L (ref 1–32)
BILIRUB SERPL-MCNC: 0.4 MG/DL (ref 0–1.2)
BUN SERPL-MCNC: 9 MG/DL (ref 6–20)
BUN/CREAT SERPL: 14.8 (ref 7–25)
CALCIUM SPEC-SCNC: 10 MG/DL (ref 8.6–10.5)
CHLORIDE SERPL-SCNC: 97 MMOL/L (ref 98–107)
CHOLEST SERPL-MCNC: 153 MG/DL (ref 0–200)
CO2 SERPL-SCNC: 28 MMOL/L (ref 22–29)
CREAT SERPL-MCNC: 0.61 MG/DL (ref 0.57–1)
CREAT UR-MCNC: 31.6 MG/DL
DEPRECATED RDW RBC AUTO: 46.8 FL (ref 37–54)
EGFRCR SERPLBLD CKD-EPI 2021: 110.4 ML/MIN/1.73
ERYTHROCYTE [DISTWIDTH] IN BLOOD BY AUTOMATED COUNT: 14.5 % (ref 12.3–15.4)
FOLATE SERPL-MCNC: 9.94 NG/ML (ref 4.78–24.2)
GLOBULIN UR ELPH-MCNC: 2.8 GM/DL
GLUCOSE SERPL-MCNC: 200 MG/DL (ref 65–99)
HBA1C MFR BLD: 7.9 % (ref 4.8–5.6)
HCT VFR BLD AUTO: 44.5 % (ref 34–46.6)
HDLC SERPL-MCNC: 34 MG/DL (ref 40–60)
HGB BLD-MCNC: 15.1 G/DL (ref 12–15.9)
LDLC SERPL CALC-MCNC: 92 MG/DL (ref 0–100)
LDLC/HDLC SERPL: 2.62 {RATIO}
MCH RBC QN AUTO: 30.1 PG (ref 26.6–33)
MCHC RBC AUTO-ENTMCNC: 33.9 G/DL (ref 31.5–35.7)
MCV RBC AUTO: 88.9 FL (ref 79–97)
MICROALBUMIN/CREAT UR: NORMAL MG/G{CREAT}
PLATELET # BLD AUTO: 228 10*3/MM3 (ref 140–450)
PMV BLD AUTO: 8.9 FL (ref 6–12)
POTASSIUM SERPL-SCNC: 3.9 MMOL/L (ref 3.5–5.2)
PROT SERPL-MCNC: 7.2 G/DL (ref 6–8.5)
RBC # BLD AUTO: 5.01 10*6/MM3 (ref 3.77–5.28)
SODIUM SERPL-SCNC: 134 MMOL/L (ref 136–145)
TRIGL SERPL-MCNC: 150 MG/DL (ref 0–150)
TSH SERPL DL<=0.05 MIU/L-ACNC: 1.16 UIU/ML (ref 0.27–4.2)
VIT B12 BLD-MCNC: 246 PG/ML (ref 211–946)
VLDLC SERPL-MCNC: 27 MG/DL (ref 5–40)
WBC NRBC COR # BLD AUTO: 8.6 10*3/MM3 (ref 3.4–10.8)

## 2023-12-22 PROCEDURE — 82607 VITAMIN B-12: CPT

## 2023-12-22 PROCEDURE — 83036 HEMOGLOBIN GLYCOSYLATED A1C: CPT

## 2023-12-22 PROCEDURE — 82746 ASSAY OF FOLIC ACID SERUM: CPT | Performed by: REGISTERED NURSE

## 2023-12-22 PROCEDURE — 80061 LIPID PANEL: CPT

## 2023-12-22 PROCEDURE — 82043 UR ALBUMIN QUANTITATIVE: CPT

## 2023-12-22 PROCEDURE — 80050 GENERAL HEALTH PANEL: CPT

## 2023-12-22 PROCEDURE — 36415 COLL VENOUS BLD VENIPUNCTURE: CPT | Performed by: REGISTERED NURSE

## 2023-12-22 PROCEDURE — 82570 ASSAY OF URINE CREATININE: CPT

## 2023-12-22 RX ORDER — GABAPENTIN 800 MG/1
800 TABLET ORAL 2 TIMES DAILY
Qty: 60 TABLET | Refills: 2 | Status: SHIPPED | OUTPATIENT
Start: 2023-12-22 | End: 2024-03-21

## 2023-12-22 RX ORDER — CITALOPRAM HYDROBROMIDE 10 MG/1
10 TABLET ORAL DAILY
Qty: 30 TABLET | Refills: 0 | Status: SHIPPED | OUTPATIENT
Start: 2023-12-22 | End: 2024-03-21

## 2023-12-22 NOTE — PROGRESS NOTES
Subjective        Constanza Mccall is a 48 y.o. female.     Chief Complaint   Patient presents with    Establish Care     New pt     HPI    Pt is here as a new pt to this practice to establish care with me.     Diabetes - Unstable. Pt says she has been without insurance since her  passed in Oct 2022 and she has not been able to afford any of her medications. She has peripheral neuropathy and Left BKA since April 2023. She needs Lantus, gabapentin and Glucophage ordered today. Labs ordered. Referral to Endo.  HTN - Stable. Pt sees Dr. Sarmiento Cardiology. She sees him on Dec 27th. Continue medications as per his orders. Labs ordered today, she did not have labs completed from cardiology in Oct 2023.  Hyperlipidemia - Unknown. Denies muscle pain/cramps. Continue Lipitor 40 mg PO daily. Labs ordered.   Depression - Unknown status. She has many issues in her personal life. She is not suicidal, only thinks at times that it would not matter if she woke up or not. She has no suicidal ideation. Conntinue Celexa 10 mg PO daily. Would like to get her to counseling in the near future. Will F/U on that in 4 weeks.    The following portions of the patient's history were reviewed and updated as appropriate: allergies, current medications, past family history, past medical history, past social history, past surgical history and problem list.    Review of Systems     Current Outpatient Medications:     albuterol sulfate  (90 Base) MCG/ACT inhaler, Inhale 2 puffs Every 4 (Four) Hours As Needed., Disp: , Rfl:     atorvastatin (LIPITOR) 40 MG tablet, Take 1 tablet by mouth Every Night., Disp: 30 tablet, Rfl: 5    Cholecalciferol (Vitamin D3) 1.25 MG (13581 UT) capsule, , Disp: , Rfl:     clopidogrel (PLAVIX) 75 MG tablet, Take 1 tablet by mouth Daily., Disp: 30 tablet, Rfl: 5    gabapentin (NEURONTIN) 800 MG tablet, Take 1 tablet by mouth 2 (Two) Times a Day for 90 days., Disp: 60 tablet, Rfl: 2    Insulin Glargine (LANTUS  "SOLOSTAR) 100 UNIT/ML injection pen, Inject 20 Units under the skin into the appropriate area as directed Every Night for 30 days. Has been out is ordered, Disp: 6 mL, Rfl: 0    lisinopril (PRINIVIL,ZESTRIL) 10 MG tablet, Take 0.5 tablets by mouth Daily., Disp: 15 tablet, Rfl: 5    metFORMIN (GLUCOPHAGE) 1000 MG tablet, Take 1 tablet by mouth 2 (Two) Times a Day With Meals for 30 days., Disp: 60 tablet, Rfl: 0    Rivaroxaban (Xarelto) 2.5 MG tablet, Take 1 tablet by mouth 2 (Two) Times a Day With Meals., Disp: 60 tablet, Rfl: 5    citalopram (CeleXA) 10 MG tablet, Take 1 tablet by mouth Daily for 90 days., Disp: 30 tablet, Rfl: 0      Objective     /69 (BP Location: Left arm, Patient Position: Sitting, Cuff Size: Large Adult)   Pulse 88   Temp 98.7 °F (37.1 °C) (Infrared)   Ht 175.3 cm (69\")   Wt 80.3 kg (177 lb)   SpO2 99%   BMI 26.14 kg/m²     Physical Exam  Constitutional:       General: She is not in acute distress.     Appearance: Normal appearance. She is normal weight. She is ill-appearing.   Eyes:      General: No scleral icterus.  Cardiovascular:      Rate and Rhythm: Normal rate and regular rhythm.      Pulses: Normal pulses.      Heart sounds: Normal heart sounds.   Pulmonary:      Effort: Pulmonary effort is normal.      Breath sounds: Normal breath sounds.   Musculoskeletal:      Right lower leg: No edema.   Skin:     General: Skin is warm and dry.      Capillary Refill: Capillary refill takes 2 to 3 seconds.   Neurological:      General: No focal deficit present.      Mental Status: She is alert and oriented to person, place, and time.      Sensory: Sensory deficit present.      Motor: No weakness.      Gait: Gait abnormal.      Comments: Peripheral neuropathy RLE. Left BKA, uses rolling walker.   Psychiatric:         Behavior: Behavior normal.         Thought Content: Thought content normal.         Judgment: Judgment normal.      Comments: Depressive mood.         Result Review : "     Recent Results (from the past 4032 hour(s))   Adult Transthoracic Echo Complete W/ Cont if Necessary Per Protocol    Collection Time: 12/15/23  1:01 PM   Result Value Ref Range    LV GLOBAL STRAIN  -14.5 %    LVIDd 3.5 cm    LVIDs 2.00 cm    IVSd 0.90 cm    LVPWd 1.10 cm    FS 42.9 %    IVS/LVPW 0.82 cm    ESV(cubed) 8.0 ml    EDV(cubed) 42.9 ml    LV mass(C)d 103.4 grams    LVOT area 3.5 cm2    LVOT diam 2.10 cm    EDV(MOD-sp4) 69.7 ml    ESV(MOD-sp4) 20.9 ml    SV(MOD-sp4) 48.8 ml    EF(MOD-sp4) 70.0 %    MV E max brian 71.3 cm/sec    MV A max brian 88.6 cm/sec    MV dec time 0.21 sec    MV E/A 0.80     Med Peak E' Brian 6.4 cm/sec    Lat Peak E' Brian 13.5 cm/sec    Avg E/e' ratio 7.17     SV(LVOT) 60.6 ml    LA dimension (2D)  3.4 cm    LV V1 max 93.8 cm/sec    LV V1 max PG 3.5 mmHg    LV V1 mean PG 2.00 mmHg    LV V1 VTI 17.5 cm    Ao pk brian 109.0 cm/sec    Ao max PG 4.8 mmHg    Ao mean PG 2.00 mmHg    Ao V2 VTI 19.3 cm    YARITZA(I,D) 3.1 cm2    MV max PG 3.7 mmHg    MV mean PG 2.00 mmHg    MV V2 VTI 25.2 cm    MV P1/2t 66.5 msec    MVA(P1/2t) 3.3 cm2    MVA(VTI) 2.41 cm2    MV dec slope 408.0 cm/sec2    PA V2 max 92.8 cm/sec    Sinus 2.8 cm   Stress Test With Myocardial Perfusion (1 Day)    Collection Time: 12/15/23  1:43 PM   Result Value Ref Range    BH CV STRESS PROTOCOL 1 Pharmacologic     Stage 1 1.0     HR Stage 1 81     BP Stage 1 136/74     Duration Min Stage 1 0     Duration Sec Stage 1 10     Stress Dose Regadenoson Stage 1 0.40     Stress Comments Stage 1 10 sec bolus injection     Stage 2 2.0     HR Stage 2 93     BP Stage 2 113/59     Duration Min Stage 2 4     Duration Sec Stage 2 0     Stress Comments Stage 2 recovery     Baseline HR 81 bpm    Baseline /74 mmHg    Peak  bpm    Peak /59 mmHg    Recovery  bpm    Recovery /64 mmHg    Target HR (85%) 146 bpm    Max. Pred. HR (100%) 172 bpm    Percent Max Pred HR 65.12 %    Percent Target HR 77 %    BH CV REST NUCLEAR ISOTOPE  DOSE 10.3 mCi    BH CV STRESS NUCLEAR ISOTOPE DOSE 31.1 mCi    Nuc Stress EF 70 %                Assessment & Plan    Diagnoses and all orders for this visit:    1. Encounter to establish care (Primary)    2. Type 2 diabetes mellitus with complication, without long-term current use of insulin  -     CBC (No diff); Future  -     Comprehensive metabolic panel; Future  -     TSH Rfx On Abnormal To Free T4; Future  -     Hemoglobin A1c; Future  -     Ambulatory Referral to Endocrinology  -     Microalbumin / Creatinine Urine Ratio - Urine, Clean Catch; Future    3. Resistant hypertension  -     CBC (No diff); Future  -     Comprehensive metabolic panel; Future  -     Lipid panel; Future  -     TSH Rfx On Abnormal To Free T4; Future    4. Mixed hyperlipidemia  -     CBC (No diff); Future  -     Comprehensive metabolic panel; Future  -     Lipid panel; Future    5. Major depressive disorder, single episode, moderate  -     Vitamin B12 level; Future  -     Folic Acid  -     CBC (No diff); Future  -     Comprehensive metabolic panel; Future  -     TSH Rfx On Abnormal To Free T4; Future    Other orders  -     citalopram (CeleXA) 10 MG tablet; Take 1 tablet by mouth Daily for 90 days.  Dispense: 30 tablet; Refill: 0  -     Fluzone (or Fluarix & Flulaval for VFC) >6 Mos (7107-5313)  -     Pneumococcal Conjugate Vaccine 20-Valent (PCV20)  -     gabapentin (NEURONTIN) 800 MG tablet; Take 1 tablet by mouth 2 (Two) Times a Day for 90 days.  Dispense: 60 tablet; Refill: 2  -     Insulin Glargine (LANTUS SOLOSTAR) 100 UNIT/ML injection pen; Inject 20 Units under the skin into the appropriate area as directed Every Night for 30 days. Has been out is ordered  Dispense: 6 mL; Refill: 0  -     metFORMIN (GLUCOPHAGE) 1000 MG tablet; Take 1 tablet by mouth 2 (Two) Times a Day With Meals for 30 days.  Dispense: 60 tablet; Refill: 0       Patient Instructions   Major Depressive Disorder, Adult  Major depressive disorder (MDD) is a mental  health condition. It may also be called clinical depression or unipolar depression. MDD causes symptoms of sadness, hopelessness, and loss of interest in things. These symptoms last most of the day, almost every day, for 2 weeks. MDD can also cause physical symptoms. It can interfere with relationships and activities, such as work, school, and activities that are usually pleasant.  MDD may be mild, moderate, or severe. It may be single-episode MDD, which happens once, or recurrent MDD, which may occur many times.  What are the causes?  The exact cause of this condition is not known.  What increases the risk?  The following factors may make someone more likely to develop MDD:  A family history of depression.  Being female.  Long-term (chronic) stress, physical illness, other mental health disorders, or substance misuse.  Trauma, including:  Family problems.  Violence or abuse.  Loss of a parent or close family member.  Experiencing discrimination.  What are the signs or symptoms?  The main symptoms of MDD usually include:  Constant depressed or irritable mood.  A loss of interest in activities.  Sleeping or eating too much or too little.  Tiredness or low energy.  Other symptoms include:  Unexplained weight gain or weight loss.  Being agitated, restless, or weak.  Feeling hopeless, worthless, or guilty.  Trouble thinking clearly or making decisions.  Thoughts of suicide or harming others.  Spending a lot of time alone.  Not being able to complete daily tasks or work.  Severe symptoms of this condition may include:  Psychotic depression.This may include false beliefs or delusions. It may also include seeing, hearing, tasting, smelling, or feeling things that are not real (hallucinations).  Chronic depression or persistent depressive disorder. This is low-level depression that lasts for at least 2 years.  Melancholic depression, or feeling extremely sad and hopeless.  Catatonic depression, which includes trouble  speaking and trouble moving.  Seasonal depression, which is caused by changes in the seasons.  How is this diagnosed?  This condition may be diagnosed based on:  Your symptoms.  Your medical and mental health history.  A physical exam.  Blood tests to rule out other conditions.  MDD is confirmed if you have either a depressed mood or loss of interest and at least four other MDD symptoms, most of the day, nearly every day, in a 2-week period.  How is this treated?  This condition is usually treated by mental health professionals, such as psychologists, psychiatrists, and clinical social workers. You may need more than one type of treatment. Treatment may include:  Psychotherapy, also called talk therapy or counseling. Types of psychotherapy include:  Cognitive behavioral therapy (CBT). This teaches you to recognize unhealthy feelings, thoughts, and behaviors, and replace them with positive thoughts and actions.  Interpersonal therapy (IPT). This helps you to improve the way you communicate with others or relate to them.  Family therapy. This treatment includes members of your family.  Medicines to treat anxiety and depression. These medicines help to balance the brain chemicals that affect your emotions.  Lifestyle changes. You may be asked to:  Limit alcohol use and avoid drug use.  Get regular exercise.  Get plenty of sleep.  Make healthy eating choices.  Spend more time outdoors.  Brain stimulation. This may be done if symptoms are very severe and other treatments have not worked. Examples of this treatment are electroconvulsive therapy and transcranial magnetic stimulation.  Follow these instructions at home:  Alcohol use  Do not drink alcohol if:  Your health care provider tells you not to drink.  You are pregnant, may be pregnant, or are planning to become pregnant.  If you drink alcohol:  Limit how much you have to:  0-1 drink a day for women  0-2 drinks a day for men.  Know how much alcohol is in your drink.  In the [place] one drink equals one 12 oz bottle of beer (355 mL), one 5 oz glass of wine (148 mL), or one 1½ oz glass of hard liquor (44 mL).  Activity  Exercise regularly and spend time outdoors.  Find activities that you enjoy and make time to do them.  Find healthy ways to manage stress, such as:  Meditation or deep breathing.  Spending time in nature.  Journaling.  Return to your normal activities as told by your health care provider. Ask your health care provider what activities are safe for you.  General instructions    Take over-the-counter and prescription medicines only as told by your health care provider.  Discuss alcohol use with your health care provider. Alcohol can affect any antidepressant medicines you are taking.  Discuss any drug use with your health care provider.  Eat a healthy diet and get enough sleep.  Consider joining a support group. Your health care provider may be able to recommend one.  Keep all follow-up visits. It is important for your health care provider to check on your mood, behavior, and medicines. Your health care provider will make changes to your treatment as needed.  Where to find more information  National [place] on Mental Illness: von.org  National [place] of Mental Health: nimh.nih.gov  American Psychiatric Association: psychiatry.org  Contact a health care provider if:  Your symptoms get worse.  You develop new symptoms.  Get help right away if:  You hurt yourself on purpose (self-harm).  You have thoughts about hurting yourself or others.  You have hallucinations.  Get help right away if you feel like you may hurt yourself or others, or have thoughts about taking your own life. Go to your nearest emergency room or:  Call 911.  Call the National Suicide Prevention Lifeline at 1-261.417.6593 or 484. This is open 24 hours a day.  Text the Crisis Text Line at 106172.  This information is not intended to replace advice given to you by your health care provider. Make sure  you discuss any questions you have with your health care provider.  Document Revised: 04/25/2023 Document Reviewed: 04/25/2023  Elsevier Patient Education © 2023 [employer]       Follow Up   Return in about 4 weeks (around 1/19/2024) for Recheck.    Patient was given instructions and counseling regarding her condition or for health maintenance advice. Please see specific information pulled into the AVS if appropriate.      Shweta Osorio, APRN     12/22/23

## 2023-12-22 NOTE — PROGRESS NOTES
December 22, 2023    Hello, may I speak with Constanza Mccall?    My name is Bianka      I am  with [facility]  [address] [place] [address].    Before we get started may I verify your date of birth? 1975    I am calling to officially welcome you to our practice and ask about your recent visit. Is this a good time to talk? yes    Tell me about your visit with us. What things went well?  My visit was really good.       We're always looking for ways to make our patients' experiences even better. Do you have recommendations on ways we may improve?  no    Overall were you satisfied with your first visit to our practice? yes       I appreciate you taking the time to speak with me today. Is there anything else I can do for you? no      Thank you, and have a great day.

## 2023-12-22 NOTE — PATIENT INSTRUCTIONS
Major Depressive Disorder, Adult  Major depressive disorder (MDD) is a mental health condition. It may also be called clinical depression or unipolar depression. MDD causes symptoms of sadness, hopelessness, and loss of interest in things. These symptoms last most of the day, almost every day, for 2 weeks. MDD can also cause physical symptoms. It can interfere with relationships and activities, such as work, school, and activities that are usually pleasant.  MDD may be mild, moderate, or severe. It may be single-episode MDD, which happens once, or recurrent MDD, which may occur many times.  What are the causes?  The exact cause of this condition is not known.  What increases the risk?  The following factors may make someone more likely to develop MDD:  A family history of depression.  Being female.  Long-term (chronic) stress, physical illness, other mental health disorders, or substance misuse.  Trauma, including:  Family problems.  Violence or abuse.  Loss of a parent or close family member.  Experiencing discrimination.  What are the signs or symptoms?  The main symptoms of MDD usually include:  Constant depressed or irritable mood.  A loss of interest in activities.  Sleeping or eating too much or too little.  Tiredness or low energy.  Other symptoms include:  Unexplained weight gain or weight loss.  Being agitated, restless, or weak.  Feeling hopeless, worthless, or guilty.  Trouble thinking clearly or making decisions.  Thoughts of suicide or harming others.  Spending a lot of time alone.  Not being able to complete daily tasks or work.  Severe symptoms of this condition may include:  Psychotic depression.This may include false beliefs or delusions. It may also include seeing, hearing, tasting, smelling, or feeling things that are not real (hallucinations).  Chronic depression or persistent depressive disorder. This is low-level depression that lasts for at least 2 years.  Melancholic depression, or feeling  extremely sad and hopeless.  Catatonic depression, which includes trouble speaking and trouble moving.  Seasonal depression, which is caused by changes in the seasons.  How is this diagnosed?  This condition may be diagnosed based on:  Your symptoms.  Your medical and mental health history.  A physical exam.  Blood tests to rule out other conditions.  MDD is confirmed if you have either a depressed mood or loss of interest and at least four other MDD symptoms, most of the day, nearly every day, in a 2-week period.  How is this treated?  This condition is usually treated by mental health professionals, such as psychologists, psychiatrists, and clinical social workers. You may need more than one type of treatment. Treatment may include:  Psychotherapy, also called talk therapy or counseling. Types of psychotherapy include:  Cognitive behavioral therapy (CBT). This teaches you to recognize unhealthy feelings, thoughts, and behaviors, and replace them with positive thoughts and actions.  Interpersonal therapy (IPT). This helps you to improve the way you communicate with others or relate to them.  Family therapy. This treatment includes members of your family.  Medicines to treat anxiety and depression. These medicines help to balance the brain chemicals that affect your emotions.  Lifestyle changes. You may be asked to:  Limit alcohol use and avoid drug use.  Get regular exercise.  Get plenty of sleep.  Make healthy eating choices.  Spend more time outdoors.  Brain stimulation. This may be done if symptoms are very severe and other treatments have not worked. Examples of this treatment are electroconvulsive therapy and transcranial magnetic stimulation.  Follow these instructions at home:  Alcohol use  Do not drink alcohol if:  Your health care provider tells you not to drink.  You are pregnant, may be pregnant, or are planning to become pregnant.  If you drink alcohol:  Limit how much you have to:  0-1 drink a day for  women  0-2 drinks a day for men.  Know how much alcohol is in your drink. In the [place] one drink equals one 12 oz bottle of beer (355 mL), one 5 oz glass of wine (148 mL), or one 1½ oz glass of hard liquor (44 mL).  Activity  Exercise regularly and spend time outdoors.  Find activities that you enjoy and make time to do them.  Find healthy ways to manage stress, such as:  Meditation or deep breathing.  Spending time in nature.  Journaling.  Return to your normal activities as told by your health care provider. Ask your health care provider what activities are safe for you.  General instructions    Take over-the-counter and prescription medicines only as told by your health care provider.  Discuss alcohol use with your health care provider. Alcohol can affect any antidepressant medicines you are taking.  Discuss any drug use with your health care provider.  Eat a healthy diet and get enough sleep.  Consider joining a support group. Your health care provider may be able to recommend one.  Keep all follow-up visits. It is important for your health care provider to check on your mood, behavior, and medicines. Your health care provider will make changes to your treatment as needed.  Where to find more information  National [place] [employer] Mental Illness: von.org  National [place] of Mental Health: nimh.nih.gov  American Psychiatric Association: psychiatry.org  Contact a health care provider if:  Your symptoms get worse.  You develop new symptoms.  Get help right away if:  You hurt yourself on purpose (self-harm).  You have thoughts about hurting yourself or others.  You have hallucinations.  Get help right away if you feel like you may hurt yourself or others, or have thoughts about taking your own life. Go to your nearest emergency room or:  Call 911.  Call the National Suicide Prevention Lifeline at 1-297.335.2080 or 706. This is open 24 hours a day.  Text the Crisis Text Line at 830434.  This information is not  intended to replace advice given to you by your health care provider. Make sure you discuss any questions you have with your health care provider.  Document Revised: 04/25/2023 Document Reviewed: 04/25/2023  Elsevier Patient Education © 2023 [employer]

## 2023-12-27 ENCOUNTER — OFFICE VISIT (OUTPATIENT)
Dept: CARDIOLOGY | Facility: CLINIC | Age: 48
End: 2023-12-27
Payer: MEDICAID

## 2023-12-27 VITALS
BODY MASS INDEX: 26.96 KG/M2 | WEIGHT: 182 LBS | SYSTOLIC BLOOD PRESSURE: 126 MMHG | DIASTOLIC BLOOD PRESSURE: 72 MMHG | HEIGHT: 69 IN | HEART RATE: 89 BPM | RESPIRATION RATE: 18 BRPM | OXYGEN SATURATION: 93 %

## 2023-12-27 DIAGNOSIS — I73.9 PERIPHERAL VASCULAR DISEASE: ICD-10-CM

## 2023-12-27 DIAGNOSIS — R07.9 CHEST PAIN, UNSPECIFIED TYPE: Primary | ICD-10-CM

## 2023-12-27 DIAGNOSIS — I73.9 PVD (PERIPHERAL VASCULAR DISEASE): ICD-10-CM

## 2023-12-27 DIAGNOSIS — Z92.89 HISTORY OF ECHOCARDIOGRAM: ICD-10-CM

## 2023-12-27 DIAGNOSIS — I25.708 CORONARY ARTERY DISEASE OF BYPASS GRAFT OF NATIVE HEART WITH STABLE ANGINA PECTORIS: ICD-10-CM

## 2023-12-27 DIAGNOSIS — R06.09 DOE (DYSPNEA ON EXERTION): ICD-10-CM

## 2023-12-27 PROCEDURE — 99214 OFFICE O/P EST MOD 30 MIN: CPT | Performed by: INTERNAL MEDICINE

## 2023-12-27 PROCEDURE — 3074F SYST BP LT 130 MM HG: CPT | Performed by: INTERNAL MEDICINE

## 2023-12-27 PROCEDURE — 3078F DIAST BP <80 MM HG: CPT | Performed by: INTERNAL MEDICINE

## 2023-12-28 DIAGNOSIS — E11.8 TYPE 2 DIABETES MELLITUS WITH COMPLICATION, WITHOUT LONG-TERM CURRENT USE OF INSULIN: Primary | ICD-10-CM

## 2023-12-28 RX ORDER — LANCETS
EACH MISCELLANEOUS
Qty: 100 EACH | Refills: 12 | Status: SHIPPED | OUTPATIENT
Start: 2023-12-28

## 2024-01-04 ENCOUNTER — TELEPHONE (OUTPATIENT)
Dept: FAMILY MEDICINE CLINIC | Facility: CLINIC | Age: 49
End: 2024-01-04

## 2024-01-04 NOTE — TELEPHONE ENCOUNTER
Relationship: Self    Best call back number:     [phone]        Which medication are you concerned about:   GLUCOSE METER- ACCUCHECK  INSULIN NEEDLES  TEST STRIPS  LANCETS     What are your concerns:   PLEASE SEND SCRIPT TO:  [id number] [place] [facility] [address] [place] [address] [phone] [facility] [phone]

## 2024-01-05 DIAGNOSIS — E11.8 TYPE 2 DIABETES MELLITUS WITH COMPLICATION, WITHOUT LONG-TERM CURRENT USE OF INSULIN: ICD-10-CM

## 2024-01-05 RX ORDER — BLOOD SUGAR DIAGNOSTIC
STRIP MISCELLANEOUS
Qty: 100 EACH | Refills: 12 | Status: SHIPPED | OUTPATIENT
Start: 2024-01-05

## 2024-01-05 RX ORDER — BLOOD-GLUCOSE METER
1 EACH MISCELLANEOUS 3 TIMES DAILY
Qty: 1 KIT | Refills: 0 | Status: SHIPPED | OUTPATIENT
Start: 2024-01-05

## 2024-01-05 RX ORDER — LANCETS
EACH MISCELLANEOUS
Qty: 100 EACH | Refills: 12 | Status: SHIPPED | OUTPATIENT
Start: 2024-01-05

## 2024-01-05 NOTE — PROGRESS NOTES
Cardiology Clinic Note  Merrick Sarmiento MD, PhD    Subjective:     Encounter Date:12/27/2023      Patient ID: Constanza Mccall is a 48 y.o. female.    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  Ultimately she was placed on anticoagulation and followed up with repeat AIF with apparent improvement in her distal below the knee runoff.  She has no wounds on her left lower extremity.  During that second procedure she was noted to have ostial SFA disease greater than 80% with significant greater than 30 mm gradient between the SFA and femoral artery.  It underwent CSI with drug-coated balloon angioplasty with good results in mild dissection which healed.  She was ultimately brought back demonstrating good healing of this ostial SFA as well as good distal runoff but she had recurrence of stenosis in the peroneal TP trunk as well as proximal anterior tibial on the left.  On previous encounters, she continues to smoke heavily greater than 2 packs/day despite numerous counseling at each visit.  She has been tolerating Plavix and low-dose Xarelto 2.5 twice daily medical therapy for quite some time.  She has significant diabetic neuropathy with pain in bilateral feet preventing good ambulation. She also has musculoskeletal pain knees and hips. Glucose reading and A1c have been chronically uncontrolled despite adequate current counseling.    She is largely sedentary and does not  exert herself much again secondary to above issues.  She has coronary risk factors of hypertension hyperlipidemia diabetes requiring insulin as well as heavy long-term smoking.  Her EF was previously normal by echo December 2018 EF 60%.  She underwent left heart catheterization secondary to high risk pre-test probability with ongoing recurrent chest pain as well as left-sided substernal chest pain with findings of small vessel coronary disease in the diagonal as well as  of OM1 not amenable to revascularization with preserved LV systolic function EF 60% with no significant valvular heart disease. She is also had peripheral vascular intervention as described. Most recently ABIs demonstrated mildly reduced DEEPA with mild digital ischemia on the left and right DEEPA normal with mild digital ischemia on the right.  She was being evaluated for stage intervention to the ostial left iliac however recurrent vascular intervention for this patient in the setting of uncontrolled diabetes and continued chronic smoking nondistended quitting is suboptimal.  She continues to have minimal tissue loss, no cellulitis, no significant wounds or threatened limb at this point and we discussed further deferral of vascular intervention until essential clinically given these issues as well stable claudication versus neurogenic claudication.  We did discuss that vascular disease peripherally is likely to continue unabated if diabetes and smoking are left unchecked which she understands.  No chest pain.  No palpitations no presyncope.  Her main complaint is chronic pain in her feet difficulty walking. Her overall condition on encounter today is unchanged we addressed a variety of issues including her entire cardiac history, risk factors, medications today risks and benefits and necessity of each.     Back to clinic today, since our prior encounter she has had an amputation of the left leg below the knee with advanced peripheral vascular  disease, heavy smoking and underlying diabetes.  She has a lot of social issues going on right now with possible loss of her house, living with a friend, issues with her daughter as well as her healthcare concerns.  She continues to smoke up to 3 packs/day despite heavy counseling, health repercussions as stated.  She is not interested in quitting smoking still despite heavy counseling.  She remarks about chronic pain in her hips and knees creating mobility issues.  She has chronic shortness of breath secondary to her smoking but recent stress and echo demonstrated normal LV size size and function with EF of 65%, stress was normal EF of 70% with no inducible ischemia.  Left heart catheterization 2019 demonstrated nonobstructive disease and major branches of LAD circumflex and RCA as described 20 to 30% however a small diagonal branch 70% to 80% too small for intervention and was treated medically.  No other symptoms or issues    2D echo December 2023, EF 60 to 65% with grade 1 diastolic dysfunction, grossly normal atrial sizes,  Trace to mild valvular insufficiency    Stress evaluation December 2023  No evidence of ischemia, EF 70%, normal stress and rest imaging with Lexiscan stress  Review of systems some 14 point review of systems is negative except was mentioned above     Historical data copied forward from previous encounters is unchanged  Review of systems otherwise negative x 14 point review of systems except as mentioned above    Exam  Vitals reviewed below  Regular rate and rhythm  No clubbing cyanosis  Diminished pulse in the right leg, BKA left leg  Neurologically intact grossly  Diffuse expiratory wheezes and diminished breath sounds and delayed expiratory phase  Heavy smoke smell, hands discolored from smoking  Skin is warm and dry  Normal cap refill normal radial pulses  No obvious peripheral wounds right lower extremity  Soft nontender nondistended  No carotid bruits or JVD      Assessment plan per my  "encounter  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  Heavy tobacco abuse  Nonobstructive CAD  Essential hypertension  Hyperlipidemia  Diabetes    Continue Plavix and low-dose Xarelto, high intensity statin  Treat for LDL less than 70 optimally less than 55  Smoking cessation counseling again performed  Follow-up vascular surgery for peripheral vascular disease and repeat ABIs, high chance of contralateral amputation in the future    Goal blood pressure less than 135 systolic  Goal LDL less than 70  Diet and exercise per AHA guidelines     Return to clinic in 1 year, primary care in the interim with protocol      Smoking cessation counseling, diabetes control and lipid control discussed at length along with exercise program    Objective:         /72 (BP Location: Left arm, Patient Position: Sitting, Cuff Size: Large Adult)   Pulse 89   Resp 18   Ht 175.3 cm (69\")   Wt 82.6 kg (182 lb)   LMP  (LMP Unknown)   SpO2 93%   BMI 26.88 kg/m²           The pleasure to be involved in this patient's cardiovascular care.  Please call with any questions or concerns  Merrick Sarmiento MD, PhD    Most recent EKG as reviewed and interpreted by me:  Procedures     Most recent echo as reviewed and interpreted by me:  Results for orders placed during the hospital encounter of 12/15/23    Adult Transthoracic Echo Complete W/ Cont if Necessary Per Protocol    Interpretation Summary    Left ventricular systolic function is normal. Left ventricular ejection fraction appears to be 61 - 65%.    Left ventricular diastolic function is consistent with (grade I) impaired relaxation.      Most recent stress test as reviewed and interpreted by me:  Results for orders placed during the hospital encounter of 12/15/23    Stress Test With Myocardial Perfusion (1 Day)    Interpretation Summary    Left ventricular ejection fraction is normal (Calculated EF = 70%).    Myocardial perfusion imaging indicates a normal myocardial perfusion study " with no evidence of ischemia.    Impressions are consistent with a low risk study.    This is normal Cardiolite imaging stress test with no evidence of ischemia or myocardial infarction.  Left ventricle size and function is normal on gated SPECT imaging.  No wall motion abnormality was noted.  Clinical correlation recommended.  Further recommendation as per ordering physician. .    Findings consistent with a normal ECG stress test.      Most recent cardiac catheterization as reviewed interpreted by me:  Results for orders placed during the hospital encounter of 10/15/19    Cardiac Catheterization/Vascular Study    Narrative   Merrick Sarmiento MD, PhD  Date of service 10-15-19    Procedure  1.  Left heart catheterization and coronary angiography left ventricular atrophy in FRANK position  2.  Distal aortography with iliofemoral runoff bilaterally  3.  Selective retrograde right femoral angiography with distal runoff to the knee  4.  Nonselective left iliofemoral and SFA/profunda runoff    Indication  1.  Abnormal stress test with high risk for CAD, PAD known, dyspnea on exertion.  2.  Peripheral vascular disease with significant claudication symptoms and known inflow disease, abnormal ABIs.    Technique  After informed consent the patient was brought to the catheterization lab sterilely prepped draped usual fashion with exposure the right groin for right common femoral arterial access via micropuncture modified Seldinger technique with placement of a 6 [place] sheath to the right common femoral artery and fluoroscopic guidance after 1% lidocaine analgesia.  After the sheath was aspirated flushed with heparinized saline and 035 guidewire was advanced to the aortic arch.  Secondary to difficulty with vascular access as well as claudication symptoms in the right extremity a retrograde injection through the sheath was performed which demonstrated severely small caliber and spasm versus atretic  appearance of the right external iliac into femoral artery without significant focal stenosis.  The caliber of this vessel is only 3 to 4 mm compared to the sheath.  Additional runoff to the knee was performed with retrograde nonselective injection in the femoral and external iliac on the right with runoff to the knee demonstrating nonobstructive SFA and profundal disease proximally 50% of each proximally.  Next a pigtail catheter was then advanced over the wire in place in the distal aorta with distal aortography with iliofemoral runoff.  This demonstrated significant inflow disease in the ostial proximal left common iliac with greater than 90% stenosis in appearance of ulcerated plaque versus calcific cleft versus dissection.  There was patent flow in this vessel and the runoff did not appear to be delayed and there was a palpable pulse in the left femoral.  There is no significant obstructive disease in the right external iliac or right common femoral and previous site of intervention at the ostial proximal right SFA had widely patent flow and at the site of prior dissection was well-healed.  There is an ostial 50% stenosis that does not appear flow-limiting.  There is widely patent flow into the left profunda as well.  No imaging of the popliteal or below the knee circulation was performed.  Next attention was turned and the pigtail catheter was then advanced to level aortic valve followed by cross the aortic valve with left ventricular atrophy in FRANK position, left ventricular end diastolic pressure assessments, and transaortic valve gradient assessment.  This catheter was then exchanged for a diagnostic JL4 catheter for selective engagement of the left main coronary artery followed by multiple cine angiographic images multiple views for evaluation of the totality of the left coronary system.  After diagnostic left coronary angiography this catheter was then exchanged for a JR4 catheter which was used for  selective engagement and diagnostic angiography of the right coronary system and multiple views.  There was borderline disease in a small caliber diagonal branch with diffuse distal small vessel disease not in an area highlighted by abnormality and myocardial perfusion imaging stress test.  Noting her severe smoking at greater than 2 to 3 packs/day as well as normal LV systolic function the decision was made to treat this medically.  There was a vascular void with small atretic first obtuse marginal branch that appeared to be proximally and occluded likely responsible for this void in the area of the abnormality highlighted by myocardial perfusion imaging stress.  This was not amenable to intervention.  All catheters and equipment were then removed and the sheath flushed with heparinized saline.  We then attempted to perform Mynx vascular closure however the base device failed and vascular access was lost and manual pressure was held for 25 minutes which resulted in complete hemostasis and no bleeding no hematoma.  This was all performed prior to her leaving the Cath Lab for recovery.  She did well during and throughout the procedure with the Cath Lab chest pain-free, hemodynamically electrically stable and neurologically intact alert talking to staff.  The results of the case were discussed with the patient as well as her daughter in the Cath Lab.    Moderate conscious sedation was achieved with IV Versed and fentanyl admission by registered nurse with all hemodynamic, ECG, pulse oximetry monitoring continuously throughout the case supervised by myself.  Conscious sedation time 60 minutes.    Results  #1 opening aortic pressure of 107/62  2.  Closing pressure essentially unchanged  3.  LV function normal with LVEF 60% with no regional wall motion abnormality seen  4.  Normal transaortic valve gradient  5.  Normal LVEDP at 10 to 12 mmHg      Angiography  1.  Left main is a large-caliber vessel giving rise to LAD and  nondominant circumflex.  There is no angiographic disease left main  2.  The circumflex is nondominant giving rise to 2 obtuse marginal branches and a continuation circumflex and small recurrent left atrial branch.  The proximal circumflex has diffuse atherosclerotic and calcific disease 20% nonobstructive with JOSE-3 flow throughout this distribution.  The mid circumflex also has 10 to 20% calcific atherosclerotic disease as well as the large dominant second obtuse marginal branch which trifurcates distally.  There is no obstructive disease in the second obtuse marginal and continuation circumflex.  The first obtuse marginal appears to be completely totally occluded supplying a vascularly voided area along the basal lateral wall in the region of the stress test observed abnormality.  Is not amenable to intervention.  3.  The LAD is a dominant vessel coursing to and around the apex giving off 2 diagonal branches along its course as well as numerous septal perforators.  The proximal LAD has diffuse luminal irregularities most severely 20% proximally.  The midportion also has 20 to 30% luminal irregularities and likely adventitial calcium in the distal LAD also has 20 to 30% diffuse luminal irregularities.  The apical portion course to and around the apex bifurcating distally without obstructive disease the apical LAD which appears to be 2.5 mm in diameter.  The first diagonal branch is small and insignificant with diffuse small vessel disease.  Second diagonal branch course along the anterolateral wall and bifurcates distally.  There appears to be a mid 70% focal stenosis followed by bifurcation of superior and inferior branches.  The inferior branch has an ostial 80% stenosis.  The distribution of this vessel in the body appears to be 2 mm in diameter or less in the distal inferior branch appears to be less than 1.5 mm in diameter not amenable to stenting in this location.  This was in the distribution not  identified by stress MPI abnormality .  Given her smoking history as well as normal LV function and lack of convincing symptoms of chest discomfort and only shortness of breath which could be explained by underlying lung disease this will be treated medically at this time.  4.  The RCA is a dominant vessel giving rise to PLV and PDA branches distally.  There is mild nonobstructive disease less than 30% throughout the RCA.  There is no obstructive disease in diffuse luminal irregularities throughout the PDA and PLV and small vessel disease distally.      Peripheral angiography:  1.  Distal aortography, patent distal aorta  2.  Nonselective runoff of the left iliofemoral system demonstrates proximal ostial greater than 90% stenosis of the left iliac ostium with patent antegrade flow.  There is no obstructive disease in the left external iliac or left common femoral.  3.  Left profunda patent  4.  Left SFA has ostial 50% stenosis and patent distally into Inocente's canal  5.  Right common iliac, external iliac are patent however externally iliac appears very small in caliber approximately 4 mm widening out into the common femoral.  There is no focal stenosis in this distribution.  There is patent flow into the right SFA and right profunda.  6.  The right SFA has proximal to mid 50% eccentric plaque but patent distal flow to the knee.  There is no obstructive disease in the profunda.    Conclusions  1.  Normal epicardial coronary anatomy with likely obstructive disease and a small caliber diagonal branch at 70% followed by inferior limb ostial 80% small caliber vessel disease.  2.  No significant LAD stenosis, no significant RCA disease, no significant circumflex disease other than possible  of the small caliber OM1 proximally.  3.  Normal LV function  4.  Normal LVEDP  5.  No significant transaortic valve gradient  6.  Significant obstructive PAD which is new since prior encounter with ostial left iliac stenosis as  well as small size and diffusely diseased right external iliac without focal stenosis.    Recommendations  1.  Max medical therapy for secondary prevention of coronary artery disease  2.  Consider intervention to the ostial proximal left iliac as will be discussed with patient which will be staged  3.  Continuation of Plavix therapy as well as Xarelto 2.5 mg p.o. twice daily for PAD  4.  Hypertension treatment per guidelines  5.  Strict diabetes control recommended for the patient  6.  Smoking cessation advised at length with extended counseling today based on progression of vascular disease over the past 18 months and noting increasing debility.  7.  Patient will be observed overnight secondary to failure vascular closure device to ensure no bleeding will be discharged in the morning once discharge criteria met.      At the pleasure to be involved in her cardiovascular care.  Is call with any questions or concerns.  Merrick Sarmiento MD PhD    The following portions of the patient's history were reviewed and updated as appropriate: allergies, current medications, past family history, past medical history, past social history, past surgical history, and problem list.      ROS:  14 point review of systems negative except as mentioned above    Current Outpatient Medications:     albuterol sulfate  (90 Base) MCG/ACT inhaler, Inhale 2 puffs Every 4 (Four) Hours As Needed., Disp: , Rfl:     atorvastatin (LIPITOR) 40 MG tablet, Take 1 tablet by mouth Every Night., Disp: 30 tablet, Rfl: 5    Cholecalciferol (Vitamin D3) 1.25 MG (66043 UT) capsule, , Disp: , Rfl:     clopidogrel (PLAVIX) 75 MG tablet, Take 1 tablet by mouth Daily., Disp: 30 tablet, Rfl: 5    gabapentin (NEURONTIN) 800 MG tablet, Take 1 tablet by mouth 2 (Two) Times a Day for 90 days., Disp: 60 tablet, Rfl: 2    Insulin Glargine (LANTUS SOLOSTAR) 100 UNIT/ML injection pen, Inject 20 Units under the skin into the appropriate area as directed Every Night  for 30 days. Has been out is ordered, Disp: 6 mL, Rfl: 0    lisinopril (PRINIVIL,ZESTRIL) 10 MG tablet, Take 0.5 tablets by mouth Daily., Disp: 15 tablet, Rfl: 5    metFORMIN (GLUCOPHAGE) 1000 MG tablet, Take 1 tablet by mouth 2 (Two) Times a Day With Meals for 30 days., Disp: 60 tablet, Rfl: 0    Rivaroxaban (Xarelto) 2.5 MG tablet, Take 1 tablet by mouth 2 (Two) Times a Day With Meals., Disp: 60 tablet, Rfl: 5    glucose blood test strip, Use as instructed, Disp: 200 each, Rfl: 12    Lancets (onetouch ultrasoft) lancets, Use as instructed, Disp: 100 each, Rfl: 12    Problem List:  Patient Active Problem List   Diagnosis    Type 2 diabetes mellitus with complication, without long-term current use of insulin    Peripheral vascular disease    History of echocardiogram    CAD (coronary artery disease)    Ulcer of toe    Superficial femoral artery occlusion    Neuropathy    Family history of diabetes mellitus    Hypertension    Hyperlipidemia     Past Medical History:  Past Medical History:   Diagnosis Date    Coronary artery disease     Diabetes mellitus     History of echocardiogram 12/17/2018    EF 60% Normal LV diastolic filling parameters. There is normal LV wall thickness. Normla trileaflet aortic valve. Normal mitral valve. Normal LV/RV fucntion with no significant valvulopathy. Normla right anf left pressures estimated.     Hyperlipidemia 12/22/2023    Hypertension 12/22/2023    Neuropathy     Numbness and tingling sensation of skin 06/14/2017    Peripheral artery disease     Rash of hands 06/13/2017     Past Surgical History:  Past Surgical History:   Procedure Laterality Date    AMPUTATION DIGIT Left 6/3/2022    Procedure: INCISION AND DRAINAGE WITH AMPUTATION OF LEFT 3RD TOE;  Surgeon: Deonte Heath Jr., DPM;  Location: [facility] MAIN OR;  Service: Podiatry;  Laterality: Left;    CARDIAC CATHETERIZATION N/A 10/15/2019    Procedure: Left Heart Cath;  Surgeon: Merrick Sarmiento MD;  Location: [facility]  CATH INVASIVE LOCATION;  Service: Cardiology    CHOLECYSTECTOMY      OOPHORECTOMY       Social History:  Social History     Socioeconomic History    Marital status:    Tobacco Use    Smoking status: Every Day     Packs/day: 2.50     Years: 20.00     Additional pack years: 0.00     Total pack years: 50.00     Types: Cigarettes    Smokeless tobacco: Never    Tobacco comments:     cut down quit none dos   Vaping Use    Vaping Use: Never used   Substance and Sexual Activity    Alcohol use: No    Drug use: No    Sexual activity: Defer     Allergies:  No Known Allergies  Immunizations:  Immunization History   Administered Date(s) Administered    Fluzone (or Fluarix & Flulaval for VFC) >6mos 09/18/2018, 09/23/2022, 12/22/2023    Pneumococcal Conjugate 20-Valent (PCV20) 12/22/2023    Pneumococcal Polysaccharide (PPSV23) 02/19/2020, 12/28/2021    Tdap 08/27/2018            In-Office Procedure(s):  No orders to display        ASCVD RIsk Score::  The ASCVD Risk score (Sanchez JACKSON, et al., 2019) failed to calculate for the following reasons:    The patient has a prior MI or stroke diagnosis    Imaging:    Results for orders placed during the hospital encounter of 09/22/23    XR Knee 1 or 2 View Right    Narrative  XR KNEE 1 OR 2 VW RIGHT, XR KNEE 1 OR 2 VW LEFT    Date of Exam: 9/22/2023 11:36 AM EDT    Indication: Disability determination, bilateral knee pain, below the left knee amputation procedure.    Comparison: None available.    Findings:  Right knee: There is no acute fracture or dislocation. The medial and lateral joint space compartments are normal. There is mild spurring of the patellofemoral joint consistent with osteoarthritis. There are no osseous lesions. There is no knee joint  effusion.    Left knee: The bony structures are demineralized. The patient is status post a below the knee left lower extremity amputation procedure. The medial and lateral joint space compartments are normal. There is mild spurring  of the patellofemoral joint  consistent with osteoarthritis. There is no knee joint effusion. There is no acute fracture or dislocation.    Impression  Impression:    1. No acute fracture or dislocation of the right or left knee.  2. Bilateral patellofemoral joint degenerative arthritis.  3. Bony demineralization of the left knee with a previous below the knee amputation procedure.      Electronically Signed: Mason Ramirez MD  9/25/2023 10:34 AM EDT  Workstation ID: [id number]               Lab Review:   Lab on 12/22/2023   Component Date Value    Microalbumin/Creatinine * 12/22/2023      Creatinine, Urine 12/22/2023 31.6     Microalbumin, Urine 12/22/2023 <1.2     Vitamin B-12 12/22/2023 246     WBC 12/22/2023 8.60     RBC 12/22/2023 5.01     Hemoglobin 12/22/2023 15.1     Hematocrit 12/22/2023 44.5     MCV 12/22/2023 88.9     MCH 12/22/2023 30.1     MCHC 12/22/2023 33.9     RDW 12/22/2023 14.5     RDW-SD 12/22/2023 46.8     MPV 12/22/2023 8.9     Platelets 12/22/2023 228     Glucose 12/22/2023 200 (H)     BUN 12/22/2023 9     Creatinine 12/22/2023 0.61     Sodium 12/22/2023 134 (L)     Potassium 12/22/2023 3.9     Chloride 12/22/2023 97 (L)     CO2 12/22/2023 28.0     Calcium 12/22/2023 10.0     Total Protein 12/22/2023 7.2     Albumin 12/22/2023 4.4     ALT (SGPT) 12/22/2023 11     AST (SGOT) 12/22/2023 9     Alkaline Phosphatase 12/22/2023 70     Total Bilirubin 12/22/2023 0.4     Globulin 12/22/2023 2.8     A/G Ratio 12/22/2023 1.6     BUN/Creatinine Ratio 12/22/2023 14.8     Anion Gap 12/22/2023 9.0     eGFR 12/22/2023 110.4     Total Cholesterol 12/22/2023 153     Triglycerides 12/22/2023 150     HDL Cholesterol 12/22/2023 34 (L)     LDL Cholesterol  12/22/2023 92     VLDL Cholesterol 12/22/2023 27     LDL/HDL Ratio 12/22/2023 2.62     TSH 12/22/2023 1.160     Hemoglobin A1C 12/22/2023 7.90 (H)    Office Visit on 12/22/2023   Component Date Value    Folate 12/22/2023 9.94    Hospital Outpatient Visit on  12/15/2023   Component Date Value    BH CV STRESS PROTOCOL 1 12/15/2023 Pharmacologic     Stage 1 12/15/2023 1.0     HR Stage 1 12/15/2023 81     BP Stage 1 12/15/2023 136/74     Duration Min Stage 1 12/15/2023 0     Duration Sec Stage 1 12/15/2023 10     Stress Dose Regadenoson * 12/15/2023 0.40     Stress Comments Stage 1 12/15/2023 10 sec bolus injection     Stage 2 12/15/2023 2.0     HR Stage 2 12/15/2023 93     BP Stage 2 12/15/2023 113/59     Duration Min Stage 2 12/15/2023 4     Duration Sec Stage 2 12/15/2023 0     Stress Comments Stage 2 12/15/2023 recovery     Baseline HR 12/15/2023 81     Baseline BP 12/15/2023 136/74     Peak HR 12/15/2023 112     Peak BP 12/15/2023 113/59     Recovery HR 12/15/2023 102     Recovery BP 12/15/2023 129/64     Target HR (85%) 12/15/2023 146     Max. Pred. HR (100%) 12/15/2023 172     Percent Max Pred HR 12/15/2023 65.12     Percent Target HR 12/15/2023 77     BH CV REST NUCLEAR ISOTO* 12/15/2023 10.3     BH CV STRESS NUCLEAR ISO* 12/15/2023 31.1     Nuc Stress EF 12/15/2023 70    Hospital Outpatient Visit on 12/15/2023   Component Date Value    LV GLOBAL STRAIN  12/15/2023 -14.5     LVIDd 12/15/2023 3.5     LVIDs 12/15/2023 2.00     IVSd 12/15/2023 0.90     LVPWd 12/15/2023 1.10     FS 12/15/2023 42.9     IVS/LVPW 12/15/2023 0.82     ESV(cubed) 12/15/2023 8.0     EDV(cubed) 12/15/2023 42.9     LV mass(C)d 12/15/2023 103.4     LVOT area 12/15/2023 3.5     LVOT diam 12/15/2023 2.10     EDV(MOD-sp4) 12/15/2023 69.7     ESV(MOD-sp4) 12/15/2023 20.9     SV(MOD-sp4) 12/15/2023 48.8     EF(MOD-sp4) 12/15/2023 70.0     MV E max biran 12/15/2023 71.3     MV A max brian 12/15/2023 88.6     MV dec time 12/15/2023 0.21     MV E/A 12/15/2023 0.80     Med Peak E' Brian 12/15/2023 6.4     Lat Peak E' Brian 12/15/2023 13.5     Avg E/e' ratio 12/15/2023 7.17     SV(LVOT) 12/15/2023 60.6     LA dimension (2D)  12/15/2023 3.4     LV V1 max 12/15/2023 93.8     LV V1 max PG 12/15/2023 3.5     LV V1  mean PG 12/15/2023 2.00     LV V1 VTI 12/15/2023 17.5     Ao pk jessenia 12/15/2023 109.0     Ao max PG 12/15/2023 4.8     Ao mean PG 12/15/2023 2.00     Ao V2 VTI 12/15/2023 19.3     YARITZA(I,D) 12/15/2023 3.1     MV max PG 12/15/2023 3.7     MV mean PG 12/15/2023 2.00     MV V2 VTI 12/15/2023 25.2     MV P1/2t 12/15/2023 66.5     MVA(P1/2t) 12/15/2023 3.3     MVA(VTI) 12/15/2023 2.41     MV dec slope 12/15/2023 408.0     PA V2 max 12/15/2023 92.8     Sinus 12/15/2023 2.8      Recent labs reviewed and interpreted for clinical significance and application            Level of Care:           Merrick Sarmiento MD  01/05/24  .

## 2024-01-09 DIAGNOSIS — E11.8 TYPE 2 DIABETES MELLITUS WITH COMPLICATION, WITHOUT LONG-TERM CURRENT USE OF INSULIN: Primary | ICD-10-CM

## 2024-01-09 RX ORDER — BLOOD-GLUCOSE METER
1 EACH MISCELLANEOUS
Qty: 1 KIT | Refills: 0 | Status: SHIPPED | OUTPATIENT
Start: 2024-01-09

## 2024-01-09 RX ORDER — BLOOD SUGAR DIAGNOSTIC
STRIP MISCELLANEOUS
Qty: 100 EACH | Refills: 12 | Status: SHIPPED | OUTPATIENT
Start: 2024-01-09

## 2024-01-21 RX ORDER — CITALOPRAM HYDROBROMIDE 10 MG/1
10 TABLET ORAL DAILY
Qty: 30 TABLET | Refills: 0 | OUTPATIENT
Start: 2024-01-21

## 2024-01-27 RX ORDER — CITALOPRAM HYDROBROMIDE 10 MG/1
10 TABLET ORAL DAILY
Qty: 30 TABLET | Refills: 2 | Status: SHIPPED | OUTPATIENT
Start: 2024-01-27

## 2024-02-01 ENCOUNTER — OFFICE VISIT (OUTPATIENT)
Dept: FAMILY MEDICINE CLINIC | Facility: CLINIC | Age: 49
End: 2024-02-01
Payer: MEDICAID

## 2024-02-01 VITALS
SYSTOLIC BLOOD PRESSURE: 112 MMHG | HEIGHT: 69 IN | BODY MASS INDEX: 26.66 KG/M2 | OXYGEN SATURATION: 98 % | HEART RATE: 85 BPM | TEMPERATURE: 97.9 F | DIASTOLIC BLOOD PRESSURE: 69 MMHG | WEIGHT: 180 LBS

## 2024-02-01 DIAGNOSIS — G62.9 NEUROPATHY: ICD-10-CM

## 2024-02-01 DIAGNOSIS — E11.8 TYPE 2 DIABETES MELLITUS WITH COMPLICATION, WITHOUT LONG-TERM CURRENT USE OF INSULIN: Primary | ICD-10-CM

## 2024-02-01 DIAGNOSIS — F33.1 MODERATE EPISODE OF RECURRENT MAJOR DEPRESSIVE DISORDER: ICD-10-CM

## 2024-02-01 PROCEDURE — 3078F DIAST BP <80 MM HG: CPT | Performed by: REGISTERED NURSE

## 2024-02-01 PROCEDURE — 99214 OFFICE O/P EST MOD 30 MIN: CPT | Performed by: REGISTERED NURSE

## 2024-02-01 PROCEDURE — 3074F SYST BP LT 130 MM HG: CPT | Performed by: REGISTERED NURSE

## 2024-02-01 RX ORDER — CITALOPRAM 20 MG/1
20 TABLET ORAL DAILY
Qty: 90 TABLET | Refills: 1 | Status: SHIPPED | OUTPATIENT
Start: 2024-02-01

## 2024-02-01 RX ORDER — GABAPENTIN 800 MG/1
800 TABLET ORAL 2 TIMES DAILY
Qty: 180 TABLET | Refills: 1 | Status: SHIPPED | OUTPATIENT
Start: 2024-02-01

## 2024-02-01 RX ORDER — LANCETS 23 GAUGE
EACH MISCELLANEOUS
Qty: 1 EACH | Refills: 12 | Status: SHIPPED | OUTPATIENT
Start: 2024-02-01

## 2024-02-01 NOTE — PROGRESS NOTES
"Subjective        Constanza Mccall is a 48 y.o. female.     Chief Complaint   Patient presents with    Follow-up     1 mo f/u      HPI    Pt is here for F/U R/T her chronic conditions and medications.    DMT2 - Stable. Pt says she has been monitoring her blood glucose twice daily and they run , \"mostly in the 100's\".  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  Previous A1c from 2023 was 7.9 which was an improvement from previous level of 9.9 in May 2021.  Diabetic foot exam of her right foot indicates no sensation in any of the 10 points tested.  Pulse is slightly weak and foot is cool to the touch.  Her pharmacy has had some trouble filling her diabetic testing equipment needs.  She also says she has not heard from anyone in the endocrinology office related to her referral from her last visit in 2023; new referral placed to endocrinology and patient is to call if she has not been contacted within 7 days.  Patient states that she is very committed to getting her blood glucose levels under control.  Continue Lantus 100 units/mL 20 units sQ nightly.  A1c ordered.  Testing equipment reordered.  Depression - Improved.  Patient says she has been very depressed since her   approximately 1 year ago, at which time she lost her insurance and was unable to seek care for her uncontrolled diabetes.  She has recently been approved for disability and now has coverage for treatment of her diabetes, etc.  She was started on Celexa 10 mg p.o. daily at her last visit, which she says helps during the day but wears off by nighttime when she feels most lonely.  Increasing her Celexa to 20 mg p.o. every night.  Will reassess efficacy at her next appointment.  Neuropathy - Unchanged.  Patient says she has had trouble with refills of her gabapentin 800 mg p.o. twice daily.  She says that the gabapentin helps " greatly with peripheral neuropathy of her right leg and with phantom pains in her left stump.  Gabapentin refilled.    Patient's overall physical appearance is greatly improved since seeing her last in December 2023.    The following portions of the patient's history were reviewed and updated as appropriate: allergies, current medications, past family history, past medical history, past social history, past surgical history and problem list.    Review of Systems     Current Outpatient Medications:     albuterol sulfate  (90 Base) MCG/ACT inhaler, Inhale 2 puffs Every 4 (Four) Hours As Needed., Disp: , Rfl:     atorvastatin (LIPITOR) 40 MG tablet, Take 1 tablet by mouth Every Night., Disp: 30 tablet, Rfl: 5    Blood Glucose Monitoring Suppl (Accu-Chek Guide) w/Device kit, Use 1 strip 3 (Three) Times a Day Before Meals., Disp: 1 kit, Rfl: 0    Cholecalciferol (Vitamin D3) 1.25 MG (73230 UT) capsule, , Disp: , Rfl:     citalopram (CeleXA) 20 MG tablet, Take 1 tablet by mouth Daily., Disp: 90 tablet, Rfl: 1    clopidogrel (PLAVIX) 75 MG tablet, Take 1 tablet by mouth Daily., Disp: 30 tablet, Rfl: 5    gabapentin (NEURONTIN) 800 MG tablet, Take 1 tablet by mouth 2 (Two) Times a Day., Disp: 180 tablet, Rfl: 1    glucose blood (Accu-Chek Guide) test strip, Check blood glucose up to 3 times a day before meals., Disp: 100 each, Rfl: 12    Insulin Glargine (LANTUS SOLOSTAR) 100 UNIT/ML injection pen, Inject 20 Units under the skin into the appropriate area as directed Every Night. Has been out is ordered, Disp: 45 mL, Rfl: 0    Lancets (onetouch ultrasoft) lancets, Use as instructed, Disp: 100 each, Rfl: 12    lisinopril (PRINIVIL,ZESTRIL) 10 MG tablet, Take 0.5 tablets by mouth Daily., Disp: 15 tablet, Rfl: 5    metFORMIN (GLUCOPHAGE) 1000 MG tablet, TAKE 1 TABLET BY MOUTH 2 (TWO) TIMES A DAY WITH MEALS FOR 30 DAYS., Disp: 60 tablet, Rfl: 0    Rivaroxaban (Xarelto) 2.5 MG tablet, Take 1 tablet by mouth 2 (Two) Times a  "Day With Meals., Disp: 60 tablet, Rfl: 5    Lancets (accu-chek safe-t pro) lancets, Check blood glucose twice daily., Disp: 1 each, Rfl: 12      Objective     /69 (BP Location: Right arm, Patient Position: Sitting, Cuff Size: Adult)   Pulse 85   Temp 97.9 °F (36.6 °C) (Oral)   Ht 175.3 cm (69\")   Wt 81.6 kg (180 lb)   SpO2 98%   BMI 26.58 kg/m²     Physical Exam  Vitals reviewed.   Constitutional:       Appearance: Normal appearance.   Eyes:      General: No scleral icterus.     Conjunctiva/sclera: Conjunctivae normal.   Cardiovascular:      Rate and Rhythm: Normal rate and regular rhythm.      Heart sounds: Normal heart sounds.   Pulmonary:      Effort: Pulmonary effort is normal.      Breath sounds: Normal breath sounds.   Musculoskeletal:      Right lower leg: No edema.   Skin:     General: Skin is dry.      Capillary Refill: Capillary refill takes 2 to 3 seconds.   Neurological:      General: No focal deficit present.      Mental Status: She is alert and oriented to person, place, and time.      Sensory: Sensory deficit present.      Motor: No weakness.      Gait: Gait abnormal.      Comments: Diabetic foot exam - Pt has no sensation of the right foot in any of the 10 areas tested. Pedal pulse is weak. Skin intact and cool to the touch.         Result Review :   The following data was reviewed by: KRISHNA Flaherty on 02/01/2024:  Recent Results (from the past 4032 hour(s))   Adult Transthoracic Echo Complete W/ Cont if Necessary Per Protocol    Collection Time: 12/15/23  1:01 PM   Result Value Ref Range    LV GLOBAL STRAIN  -14.5 %    LVIDd 3.5 cm    LVIDs 2.00 cm    IVSd 0.90 cm    LVPWd 1.10 cm    FS 42.9 %    IVS/LVPW 0.82 cm    ESV(cubed) 8.0 ml    EDV(cubed) 42.9 ml    LV mass(C)d 103.4 grams    LVOT area 3.5 cm2    LVOT diam 2.10 cm    EDV(MOD-sp4) 69.7 ml    ESV(MOD-sp4) 20.9 ml    SV(MOD-sp4) 48.8 ml    EF(MOD-sp4) 70.0 %    MV E max jessenia 71.3 cm/sec    MV A max jessenia 88.6 cm/sec    MV dec " time 0.21 sec    MV E/A 0.80     Med Peak E' Brian 6.4 cm/sec    Lat Peak E' Brian 13.5 cm/sec    Avg E/e' ratio 7.17     SV(LVOT) 60.6 ml    LA dimension (2D)  3.4 cm    LV V1 max 93.8 cm/sec    LV V1 max PG 3.5 mmHg    LV V1 mean PG 2.00 mmHg    LV V1 VTI 17.5 cm    Ao pk brian 109.0 cm/sec    Ao max PG 4.8 mmHg    Ao mean PG 2.00 mmHg    Ao V2 VTI 19.3 cm    YARITZA(I,D) 3.1 cm2    MV max PG 3.7 mmHg    MV mean PG 2.00 mmHg    MV V2 VTI 25.2 cm    MV P1/2t 66.5 msec    MVA(P1/2t) 3.3 cm2    MVA(VTI) 2.41 cm2    MV dec slope 408.0 cm/sec2    PA V2 max 92.8 cm/sec    Sinus 2.8 cm   Stress Test With Myocardial Perfusion (1 Day)    Collection Time: 12/15/23  1:43 PM   Result Value Ref Range     CV STRESS PROTOCOL 1 Pharmacologic     Stage 1 1.0     HR Stage 1 81     BP Stage 1 136/74     Duration Min Stage 1 0     Duration Sec Stage 1 10     Stress Dose Regadenoson Stage 1 0.40     Stress Comments Stage 1 10 sec bolus injection     Stage 2 2.0     HR Stage 2 93     BP Stage 2 113/59     Duration Min Stage 2 4     Duration Sec Stage 2 0     Stress Comments Stage 2 recovery     Baseline HR 81 bpm    Baseline /74 mmHg    Peak  bpm    Peak /59 mmHg    Recovery  bpm    Recovery /64 mmHg    Target HR (85%) 146 bpm    Max. Pred. HR (100%) 172 bpm    Percent Max Pred HR 65.12 %    Percent Target HR 77 %     CV REST NUCLEAR ISOTOPE DOSE 10.3 mCi     CV STRESS NUCLEAR ISOTOPE DOSE 31.1 mCi    Nuc Stress EF 70 %   Folic Acid    Collection Time: 12/22/23  2:56 PM    Specimen: Blood   Result Value Ref Range    Folate 9.94 4.78 - 24.20 ng/mL   Microalbumin / Creatinine Urine Ratio - Urine, Clean Catch    Collection Time: 12/22/23  2:56 PM    Specimen: Urine, Clean Catch   Result Value Ref Range    Microalbumin/Creatinine Ratio      Creatinine, Urine 31.6 mg/dL    Microalbumin, Urine <1.2 mg/dL   Vitamin B12 level    Collection Time: 12/22/23  2:56 PM    Specimen: Blood   Result Value Ref Range    Vitamin  B-12 246 211 - 946 pg/mL   CBC (No diff)    Collection Time: 12/22/23  2:56 PM    Specimen: Blood   Result Value Ref Range    WBC 8.60 3.40 - 10.80 10*3/mm3    RBC 5.01 3.77 - 5.28 10*6/mm3    Hemoglobin 15.1 12.0 - 15.9 g/dL    Hematocrit 44.5 34.0 - 46.6 %    MCV 88.9 79.0 - 97.0 fL    MCH 30.1 26.6 - 33.0 pg    MCHC 33.9 31.5 - 35.7 g/dL    RDW 14.5 12.3 - 15.4 %    RDW-SD 46.8 37.0 - 54.0 fl    MPV 8.9 6.0 - 12.0 fL    Platelets 228 140 - 450 10*3/mm3   Comprehensive metabolic panel    Collection Time: 12/22/23  2:56 PM    Specimen: Blood   Result Value Ref Range    Glucose 200 (H) 65 - 99 mg/dL    BUN 9 6 - 20 mg/dL    Creatinine 0.61 0.57 - 1.00 mg/dL    Sodium 134 (L) 136 - 145 mmol/L    Potassium 3.9 3.5 - 5.2 mmol/L    Chloride 97 (L) 98 - 107 mmol/L    CO2 28.0 22.0 - 29.0 mmol/L    Calcium 10.0 8.6 - 10.5 mg/dL    Total Protein 7.2 6.0 - 8.5 g/dL    Albumin 4.4 3.5 - 5.2 g/dL    ALT (SGPT) 11 1 - 33 U/L    AST (SGOT) 9 1 - 32 U/L    Alkaline Phosphatase 70 39 - 117 U/L    Total Bilirubin 0.4 0.0 - 1.2 mg/dL    Globulin 2.8 gm/dL    A/G Ratio 1.6 g/dL    BUN/Creatinine Ratio 14.8 7.0 - 25.0    Anion Gap 9.0 5.0 - 15.0 mmol/L    eGFR 110.4 >60.0 mL/min/1.73   Lipid panel    Collection Time: 12/22/23  2:56 PM    Specimen: Blood   Result Value Ref Range    Total Cholesterol 153 0 - 200 mg/dL    Triglycerides 150 0 - 150 mg/dL    HDL Cholesterol 34 (L) 40 - 60 mg/dL    LDL Cholesterol  92 0 - 100 mg/dL    VLDL Cholesterol 27 5 - 40 mg/dL    LDL/HDL Ratio 2.62    TSH Rfx On Abnormal To Free T4    Collection Time: 12/22/23  2:56 PM    Specimen: Blood   Result Value Ref Range    TSH 1.160 0.270 - 4.200 uIU/mL   Hemoglobin A1c    Collection Time: 12/22/23  2:56 PM    Specimen: Blood   Result Value Ref Range    Hemoglobin A1C 7.90 (H) 4.80 - 5.60 %     Common labs          12/22/2023    14:56   Common Labs   Glucose 200    BUN 9    Creatinine 0.61    Sodium 134    Potassium 3.9    Chloride 97    Calcium 10.0     Albumin 4.4    Total Bilirubin 0.4    Alkaline Phosphatase 70    AST (SGOT) 9    ALT (SGPT) 11    WBC 8.60    Hemoglobin 15.1    Hematocrit 44.5    Platelets 228    Total Cholesterol 153    Triglycerides 150    HDL Cholesterol 34    LDL Cholesterol  92    Hemoglobin A1C 7.90    Microalbumin, Urine <1.2               Assessment & Plan    Diagnoses and all orders for this visit:    1. Type 2 diabetes mellitus with complication, without long-term current use of insulin (Primary)  -     Lancets (accu-chek safe-t pro) lancets; Check blood glucose twice daily.  Dispense: 1 each; Refill: 12  -     Insulin Glargine (LANTUS SOLOSTAR) 100 UNIT/ML injection pen; Inject 20 Units under the skin into the appropriate area as directed Every Night. Has been out is ordered  Dispense: 45 mL; Refill: 0  -     Hemoglobin A1c; Future  -     Ambulatory Referral to Endocrinology    2. Moderate episode of recurrent major depressive disorder  -     citalopram (CeleXA) 20 MG tablet; Take 1 tablet by mouth Daily.  Dispense: 90 tablet; Refill: 1    3. Neuropathy  -     gabapentin (NEURONTIN) 800 MG tablet; Take 1 tablet by mouth 2 (Two) Times a Day.  Dispense: 180 tablet; Refill: 1       There are no Patient Instructions on file for this visit.    Follow Up   Return in about 3 months (around 5/1/2024) for Next scheduled follow up.    Patient was given instructions and counseling regarding her condition or for health maintenance advice. Please see specific information pulled into the AVS if appropriate.      Shweta Osorio, APRN     02/01/24

## 2024-02-07 ENCOUNTER — LAB (OUTPATIENT)
Dept: LAB | Facility: HOSPITAL | Age: 49
End: 2024-02-07
Payer: MEDICAID

## 2024-02-07 DIAGNOSIS — R07.9 CHEST PAIN, UNSPECIFIED TYPE: ICD-10-CM

## 2024-02-07 DIAGNOSIS — E11.8 TYPE 2 DIABETES MELLITUS WITH COMPLICATION, WITHOUT LONG-TERM CURRENT USE OF INSULIN: ICD-10-CM

## 2024-02-07 LAB
ALBUMIN SERPL-MCNC: 4.3 G/DL (ref 3.5–5.2)
ALBUMIN/GLOB SERPL: 1.7 G/DL
ALP SERPL-CCNC: 89 U/L (ref 39–117)
ALT SERPL W P-5'-P-CCNC: 10 U/L (ref 1–33)
ANION GAP SERPL CALCULATED.3IONS-SCNC: 13.5 MMOL/L (ref 5–15)
AST SERPL-CCNC: 11 U/L (ref 1–32)
BASOPHILS # BLD AUTO: 0.05 10*3/MM3 (ref 0–0.2)
BASOPHILS NFR BLD AUTO: 0.7 % (ref 0–1.5)
BILIRUB SERPL-MCNC: 0.3 MG/DL (ref 0–1.2)
BUN SERPL-MCNC: 8 MG/DL (ref 6–20)
BUN/CREAT SERPL: 13.1 (ref 7–25)
CALCIUM SPEC-SCNC: 9.7 MG/DL (ref 8.6–10.5)
CHLORIDE SERPL-SCNC: 104 MMOL/L (ref 98–107)
CHOLEST SERPL-MCNC: 112 MG/DL (ref 0–200)
CO2 SERPL-SCNC: 23.5 MMOL/L (ref 22–29)
CREAT SERPL-MCNC: 0.61 MG/DL (ref 0.57–1)
DEPRECATED RDW RBC AUTO: 46.2 FL (ref 37–54)
EGFRCR SERPLBLD CKD-EPI 2021: 110.4 ML/MIN/1.73
EOSINOPHIL # BLD AUTO: 0.34 10*3/MM3 (ref 0–0.4)
EOSINOPHIL NFR BLD AUTO: 4.5 % (ref 0.3–6.2)
ERYTHROCYTE [DISTWIDTH] IN BLOOD BY AUTOMATED COUNT: 14 % (ref 12.3–15.4)
GLOBULIN UR ELPH-MCNC: 2.6 GM/DL
GLUCOSE SERPL-MCNC: 94 MG/DL (ref 65–99)
HBA1C MFR BLD: 6.8 % (ref 4.8–5.6)
HCT VFR BLD AUTO: 43.5 % (ref 34–46.6)
HDLC SERPL-MCNC: 29 MG/DL (ref 40–60)
HGB BLD-MCNC: 14.3 G/DL (ref 12–15.9)
IMM GRANULOCYTES # BLD AUTO: 0.01 10*3/MM3 (ref 0–0.05)
IMM GRANULOCYTES NFR BLD AUTO: 0.1 % (ref 0–0.5)
LDLC SERPL CALC-MCNC: 60 MG/DL (ref 0–100)
LDLC/HDLC SERPL: 1.97 {RATIO}
LYMPHOCYTES # BLD AUTO: 2.63 10*3/MM3 (ref 0.7–3.1)
LYMPHOCYTES NFR BLD AUTO: 35.2 % (ref 19.6–45.3)
MCH RBC QN AUTO: 29.6 PG (ref 26.6–33)
MCHC RBC AUTO-ENTMCNC: 32.9 G/DL (ref 31.5–35.7)
MCV RBC AUTO: 90.1 FL (ref 79–97)
MONOCYTES # BLD AUTO: 0.52 10*3/MM3 (ref 0.1–0.9)
MONOCYTES NFR BLD AUTO: 7 % (ref 5–12)
NEUTROPHILS NFR BLD AUTO: 3.93 10*3/MM3 (ref 1.7–7)
NEUTROPHILS NFR BLD AUTO: 52.5 % (ref 42.7–76)
NRBC BLD AUTO-RTO: 0 /100 WBC (ref 0–0.2)
PLATELET # BLD AUTO: 252 10*3/MM3 (ref 140–450)
PMV BLD AUTO: 10.8 FL (ref 6–12)
POTASSIUM SERPL-SCNC: 3.8 MMOL/L (ref 3.5–5.2)
PROT SERPL-MCNC: 6.9 G/DL (ref 6–8.5)
RBC # BLD AUTO: 4.83 10*6/MM3 (ref 3.77–5.28)
SODIUM SERPL-SCNC: 141 MMOL/L (ref 136–145)
TRIGL SERPL-MCNC: 129 MG/DL (ref 0–150)
VLDLC SERPL-MCNC: 23 MG/DL (ref 5–40)
WBC NRBC COR # BLD AUTO: 7.48 10*3/MM3 (ref 3.4–10.8)

## 2024-02-07 PROCEDURE — 36415 COLL VENOUS BLD VENIPUNCTURE: CPT

## 2024-02-07 PROCEDURE — 83036 HEMOGLOBIN GLYCOSYLATED A1C: CPT

## 2024-02-07 PROCEDURE — 80061 LIPID PANEL: CPT

## 2024-02-07 PROCEDURE — 80053 COMPREHEN METABOLIC PANEL: CPT

## 2024-02-07 PROCEDURE — 85025 COMPLETE CBC W/AUTO DIFF WBC: CPT

## 2024-02-28 ENCOUNTER — TELEPHONE (OUTPATIENT)
Dept: ENDOCRINOLOGY | Facility: CLINIC | Age: 49
End: 2024-02-28
Payer: MEDICAID

## 2024-02-28 ENCOUNTER — OFFICE VISIT (OUTPATIENT)
Dept: ENDOCRINOLOGY | Facility: CLINIC | Age: 49
End: 2024-02-28
Payer: MEDICAID

## 2024-02-28 VITALS
DIASTOLIC BLOOD PRESSURE: 68 MMHG | HEIGHT: 69 IN | SYSTOLIC BLOOD PRESSURE: 115 MMHG | BODY MASS INDEX: 27.7 KG/M2 | HEART RATE: 65 BPM | OXYGEN SATURATION: 97 % | WEIGHT: 187 LBS

## 2024-02-28 DIAGNOSIS — Z79.4 TYPE 2 DIABETES MELLITUS WITH HYPERGLYCEMIA, WITH LONG-TERM CURRENT USE OF INSULIN: Primary | ICD-10-CM

## 2024-02-28 DIAGNOSIS — E11.65 TYPE 2 DIABETES MELLITUS WITH HYPERGLYCEMIA, WITH LONG-TERM CURRENT USE OF INSULIN: ICD-10-CM

## 2024-02-28 DIAGNOSIS — E11.65 TYPE 2 DIABETES MELLITUS WITH HYPERGLYCEMIA, WITH LONG-TERM CURRENT USE OF INSULIN: Primary | ICD-10-CM

## 2024-02-28 DIAGNOSIS — E66.3 OVERWEIGHT: ICD-10-CM

## 2024-02-28 DIAGNOSIS — Z79.4 TYPE 2 DIABETES MELLITUS WITH HYPERGLYCEMIA, WITH LONG-TERM CURRENT USE OF INSULIN: ICD-10-CM

## 2024-02-28 DIAGNOSIS — E11.42 DIABETIC PERIPHERAL NEUROPATHY: ICD-10-CM

## 2024-02-28 LAB — GLUCOSE BLDC GLUCOMTR-MCNC: 121 MG/DL (ref 70–105)

## 2024-02-28 PROCEDURE — 82948 REAGENT STRIP/BLOOD GLUCOSE: CPT | Performed by: INTERNAL MEDICINE

## 2024-02-28 RX ORDER — PEN NEEDLE, DIABETIC 30 GX3/16"
1 NEEDLE, DISPOSABLE MISCELLANEOUS NIGHTLY
Start: 2024-02-28 | End: 2024-02-28 | Stop reason: SDUPTHER

## 2024-02-28 RX ORDER — PEN NEEDLE, DIABETIC 30 GX3/16"
1 NEEDLE, DISPOSABLE MISCELLANEOUS NIGHTLY
Qty: 100 EACH | Refills: 2 | Status: SHIPPED | OUTPATIENT
Start: 2024-02-28

## 2024-02-28 RX ORDER — BLOOD-GLUCOSE METER
1 KIT MISCELLANEOUS
Qty: 1 EACH | Refills: 0 | Status: SHIPPED | OUTPATIENT
Start: 2024-02-28

## 2024-02-28 RX ORDER — BLOOD-GLUCOSE METER
1 KIT MISCELLANEOUS
Start: 2024-02-28 | End: 2024-02-28 | Stop reason: SDUPTHER

## 2024-02-28 RX ORDER — LANCETS 30 GAUGE
1 EACH MISCELLANEOUS
Start: 2024-02-28 | End: 2024-02-28 | Stop reason: SDUPTHER

## 2024-02-28 RX ORDER — LANCETS 30 GAUGE
1 EACH MISCELLANEOUS
Qty: 100 EACH | Refills: 5 | Status: SHIPPED | OUTPATIENT
Start: 2024-02-28

## 2024-02-28 NOTE — PROGRESS NOTES
-----------------------------------------------------------------  ENDOCRINE CLINIC NOTE  -----------------------------------------------------------------        PATIENT NAME: Constanza Mccall  PATIENT : 1975 AGE: 49 y.o.  MRN NUMBER: [id number]  PRIMARY CARE: Shweta Osorio APRN    ==========================================================================    CHIEF COMPLAINT: T2DM  DATE OF SERVICE: 24    ==========================================================================    HPI / SUBJECTIVE    49 y.o. female is seen in the clinic today for T2DM.    -Diagnosis Date: around 10 yr ago  -Last known A1c: 6.8%2024    -Current Therapy / Medications:  Lantus 20 units QHS since diagnosis  Metformin 1000 mgs PO BID    -Past tried medications: (Not currently using)  None    -Home BG logs / monitoring: Twice a day, no logs for me to review    -Hypoglycemia: None reported    -Meals / Dietary Habits: Three meals a day    -Last eye exam: Couple of years ago  -Neuropathy: Hx of left BKA in   -Nephropathy: no CKD  -No hx of CAD but have PAD    ==========================================================================                                                PAST MEDICAL HISTORY    Past Medical History:   Diagnosis Date    Coronary artery disease     Diabetes mellitus     History of echocardiogram 2018    EF 60% Normal LV diastolic filling parameters. There is normal LV wall thickness. Normla trileaflet aortic valve. Normal mitral valve. Normal LV/RV fucntion with no significant valvulopathy. Normla right anf left pressures estimated.     Hyperlipidemia 2023    Hypertension 2023    Neuropathy     Numbness and tingling sensation of skin 2017    Peripheral artery disease     Rash of hands 2017    Type 2 diabetes mellitus        ==========================================================================    PAST SURGICAL HISTORY    Past Surgical History:   Procedure  Laterality Date    AMPUTATION DIGIT Left 6/3/2022    Procedure: INCISION AND DRAINAGE WITH AMPUTATION OF LEFT 3RD TOE;  Surgeon: Deonte Heath Jr., DPM;  Location: [facility] MAIN OR;  Service: Podiatry;  Laterality: Left;    CARDIAC CATHETERIZATION N/A 10/15/2019    Procedure: Left Heart Cath;  Surgeon: Merrick Sarmiento MD;  Location: [facility] CATH INVASIVE LOCATION;  Service: Cardiology    CHOLECYSTECTOMY      OOPHORECTOMY         ==========================================================================    FAMILY HISTORY    Family History   Problem Relation Age of Onset    No Known Problems Mother     Hypertension Father     Diabetes Sister     Heart disease Sister        ==========================================================================    SOCIAL HISTORY    Social History     Socioeconomic History    Marital status:      Spouse name: Sergey    Number of children: 2    Years of education: 12   Tobacco Use    Smoking status: Every Day     Packs/day: 2.50     Years: 20.00     Additional pack years: 0.00     Total pack years: 50.00     Types: Cigarettes    Smokeless tobacco: Never    Tobacco comments:     cut down quit none dos   Vaping Use    Vaping Use: Never used   Substance and Sexual Activity    Alcohol use: No    Drug use: No    Sexual activity: Defer       ==========================================================================    MEDICATIONS      Current Outpatient Medications:     albuterol sulfate  (90 Base) MCG/ACT inhaler, Inhale 2 puffs Every 4 (Four) Hours As Needed., Disp: , Rfl:     atorvastatin (LIPITOR) 40 MG tablet, Take 1 tablet by mouth Every Night., Disp: 30 tablet, Rfl: 5    Cholecalciferol (Vitamin D3) 1.25 MG (75442 UT) capsule, , Disp: , Rfl:     citalopram (CeleXA) 20 MG tablet, Take 1 tablet by mouth Daily., Disp: 90 tablet, Rfl: 1    clopidogrel (PLAVIX) 75 MG tablet, Take 1 tablet by mouth Daily., Disp: 30 tablet, Rfl: 5    gabapentin (NEURONTIN) 800 MG tablet,  Take 1 tablet by mouth 2 (Two) Times a Day., Disp: 180 tablet, Rfl: 1    Insulin Glargine (LANTUS SOLOSTAR) 100 UNIT/ML injection pen, Inject 20 Units under the skin into the appropriate area as directed Every Night. Has been out is ordered, Disp: , Rfl:     lisinopril (PRINIVIL,ZESTRIL) 10 MG tablet, Take 0.5 tablets by mouth Daily., Disp: 15 tablet, Rfl: 5    metFORMIN (GLUCOPHAGE) 1000 MG tablet, Take 1 tablet by mouth 2 (Two) Times a Day With Meals for 360 days., Disp: 180 tablet, Rfl: 3    Rivaroxaban (Xarelto) 2.5 MG tablet, Take 1 tablet by mouth 2 (Two) Times a Day With Meals., Disp: 60 tablet, Rfl: 5    Continuous Blood Gluc  (FreeStyle [place]) device, Use 1 each 4 (Four) Times a Day Before Meals & at Bedtime., Disp: , Rfl:     Continuous Blood Gluc Sensor (FreeStyle Ed 2 Sensor) misc, Use 1 each 4 (Four) Times a Day Before Meals & at Bedtime., Disp: , Rfl:     empagliflozin (Jardiance) 10 MG tablet tablet, Take 1 tablet by mouth Daily., Disp: , Rfl:     glucose blood test strip, 1 each by Other route 4 (Four) Times a Day Before Meals & at Bedtime., Disp: , Rfl:     glucose monitor monitoring kit, Use 1 each 4 (Four) Times a Day Before Meals & at Bedtime., Disp: , Rfl:     Insulin Pen Needle (Pen Needles) 32G X 4 MM misc, Use 1 each Every Night., Disp: , Rfl:     Lancets misc, Use 1 each 4 (Four) Times a Day Before Meals & at Bedtime., Disp: , Rfl:     ==========================================================================    ALLERGIES    No Known Allergies    ==========================================================================    OBJECTIVE    Vitals:    02/28/24 1333   BP: 115/68   Pulse: 65   SpO2: 97%     Body mass index is 27.62 kg/m².     General: Alert, cooperative, no acute distress  Extremities:  Left sided prosthetic in place    ==========================================================================    LAB EVALUATION    Lab Results   Component Value Date    GLUCOSE 94  02/07/2024    BUN 8 02/07/2024    CREATININE 0.61 02/07/2024    EGFRIFNONA 122 03/12/2021    BCR 13.1 02/07/2024    K 3.8 02/07/2024    CO2 23.5 02/07/2024    CALCIUM 9.7 02/07/2024    ALBUMIN 4.3 02/07/2024    LABIL2 1.8 05/25/2021    AST 11 02/07/2024    ALT 10 02/07/2024    CHOL 112 02/07/2024    TRIG 129 02/07/2024    HDL 29 (L) 02/07/2024    LDL 60 02/07/2024     Lab Results   Component Value Date    HGBA1C 6.80 (H) 02/07/2024    HGBA1C 7.90 (H) 12/22/2023    HGBA1C 9.9 (H) 05/25/2021     Lab Results   Component Value Date    MICROALBUR <1.2 12/22/2023    CREATININE 0.61 02/07/2024     Lab Results   Component Value Date    TSH 1.160 12/22/2023       ==========================================================================    ASSESSMENT AND PLAN    # Type 2 diabetes with hyperglycemia  # Diabetic peripheral neuropathy  - Unfortunately I do not have any blood glucose to review and patient have last A1c of 6.8% on 2/7/2023  - Patient is currently maintained on insulin therapy along with metformin  - Patient will benefit from introduction of SGLT2 inhibitor therapy  - Benefit and side effect of therapy discussed with patient in detail to which she verbalized understanding  - In the future visit can also introduce DPP 4 inhibitor therapy or GLP-1 receptor agonist therapy for the patient  - Target will be to maintain A1c less than 6.5% without any hypoglycemic episodes  - Patient will need to check blood sugar 4 times a day given patient on insulin therapy and risk for hypoglycemia  - Ordered continuous glucose monitoring system for the patient  - New adjusted therapy:  Metformin 1000 mg twice a day  Jardiance 10 mg 1 pill by mouth daily  Lantus 20 units nightly  - Referral provided for diabetic eye exam    Thank you for courtesy of consultation.    Return to clinic: 3 months    Entire assessment and plan was discussed and counseled the patient in detail to which patient verbalized understanding and agreed with  "care.  Answered all queries and concerns.    This note was created using voice recognition software and is inherently subject to errors including those of syntax and \"sound-alike\" substitutions which may escape proofreading.  In such instances, original meaning may be extrapolated by contextual derivation.    Note: Portions of this note may have been copied from previous notes but documentation have been reviewed and edited as necessary to support clinical decision making for today's visit.    ==========================================================================    INFORMATION PROVIDED TO PATIENT    Patient Instructions   Please,    - Continue metformin therapy 1000 mg twice a day.  - Continue insulin glargine 20 units every night.  - Start Jardiance 10 mg 1 pill by mouth daily.    Check your blood sugar 4 times a day: Before breakfast/fasting, before lunch, before dinner and before bedtime.  Please maintain the log of your blood sugar.  I have also ordered continuous glucose monitoring system for you, if covered by the insurance you do not have to do any fingersticks.    Repeat blood work and clinical follow-up in 3 months time.    Thank you for your visit today.    If you have any questions or concerns please feel free to reach out of the office.       ==========================================================================  Tariq Polanco MD  Department of Endocrine, Diabetes and Metabolism  [facility] [place], [place]  ==========================================================================  "

## 2024-02-28 NOTE — PATIENT INSTRUCTIONS
Please,    - Continue metformin therapy 1000 mg twice a day.  - Continue insulin glargine 20 units every night.  - Start Jardiance 10 mg 1 pill by mouth daily.    Check your blood sugar 4 times a day: Before breakfast/fasting, before lunch, before dinner and before bedtime.  Please maintain the log of your blood sugar.  I have also ordered continuous glucose monitoring system for you, if covered by the insurance you do not have to do any fingersticks.    Repeat blood work and clinical follow-up in 3 months time.    Thank you for your visit today.    If you have any questions or concerns please feel free to reach out of the office.

## 2024-03-14 DIAGNOSIS — E11.65 TYPE 2 DIABETES MELLITUS WITH HYPERGLYCEMIA, WITH LONG-TERM CURRENT USE OF INSULIN: ICD-10-CM

## 2024-03-14 DIAGNOSIS — Z79.4 TYPE 2 DIABETES MELLITUS WITH HYPERGLYCEMIA, WITH LONG-TERM CURRENT USE OF INSULIN: ICD-10-CM

## 2024-04-10 DIAGNOSIS — I73.9 PVD (PERIPHERAL VASCULAR DISEASE): ICD-10-CM

## 2024-04-10 RX ORDER — CLOPIDOGREL BISULFATE 75 MG/1
75 TABLET ORAL DAILY
Qty: 90 TABLET | Refills: 1 | Status: SHIPPED | OUTPATIENT
Start: 2024-04-10

## 2024-04-10 RX ORDER — ATORVASTATIN CALCIUM 40 MG/1
40 TABLET, FILM COATED ORAL NIGHTLY
Qty: 90 TABLET | Refills: 1 | Status: SHIPPED | OUTPATIENT
Start: 2024-04-10

## 2024-04-11 RX ORDER — RIVAROXABAN 2.5 MG/1
2.5 TABLET, FILM COATED ORAL 2 TIMES DAILY WITH MEALS
Qty: 60 TABLET | Refills: 5 | Status: SHIPPED | OUTPATIENT
Start: 2024-04-11

## 2024-04-30 ENCOUNTER — APPOINTMENT (OUTPATIENT)
Dept: GENERAL RADIOLOGY | Facility: HOSPITAL | Age: 49
End: 2024-04-30
Payer: MEDICAID

## 2024-04-30 ENCOUNTER — HOSPITAL ENCOUNTER (INPATIENT)
Facility: HOSPITAL | Age: 49
LOS: 3 days | Discharge: NURSING, REHAB OR LONG-TERM CARE | End: 2024-05-03
Attending: EMERGENCY MEDICINE | Admitting: INTERNAL MEDICINE
Payer: MEDICAID
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
## 2024-04-30 DIAGNOSIS — M25.552 LEFT HIP PAIN: ICD-10-CM

## 2024-04-30 DIAGNOSIS — S72.002A CLOSED FRACTURE OF NECK OF LEFT FEMUR, INITIAL ENCOUNTER: Primary | ICD-10-CM

## 2024-04-30 PROBLEM — S72.009A HIP FRACTURE: Status: ACTIVE | Noted: 2024-04-30

## 2024-04-30 LAB
ABO GROUP BLD: NORMAL
ANION GAP SERPL CALCULATED.3IONS-SCNC: 12 MMOL/L (ref 5–15)
APTT PPP: 39 SECONDS (ref 61–76.5)
BASOPHILS # BLD AUTO: 0.03 10*3/MM3 (ref 0–0.2)
BASOPHILS NFR BLD AUTO: 0.3 % (ref 0–1.5)
BLD GP AB SCN SERPL QL: NEGATIVE
BUN SERPL-MCNC: 12 MG/DL (ref 6–20)
BUN/CREAT SERPL: 16.9 (ref 7–25)
CALCIUM SPEC-SCNC: 10.2 MG/DL (ref 8.6–10.5)
CHLORIDE SERPL-SCNC: 103 MMOL/L (ref 98–107)
CO2 SERPL-SCNC: 24 MMOL/L (ref 22–29)
CREAT SERPL-MCNC: 0.71 MG/DL (ref 0.57–1)
DEPRECATED RDW RBC AUTO: 44.3 FL (ref 37–54)
EGFRCR SERPLBLD CKD-EPI 2021: 104.4 ML/MIN/1.73
EOSINOPHIL # BLD AUTO: 0.23 10*3/MM3 (ref 0–0.4)
EOSINOPHIL NFR BLD AUTO: 2 % (ref 0.3–6.2)
ERYTHROCYTE [DISTWIDTH] IN BLOOD BY AUTOMATED COUNT: 13.2 % (ref 12.3–15.4)
FLUAV RNA RESP QL NAA+PROBE: NOT DETECTED
FLUBV RNA RESP QL NAA+PROBE: NOT DETECTED
GLUCOSE BLDC GLUCOMTR-MCNC: 167 MG/DL (ref 70–105)
GLUCOSE SERPL-MCNC: 97 MG/DL (ref 65–99)
HCT VFR BLD AUTO: 43.3 % (ref 34–46.6)
HGB BLD-MCNC: 14.2 G/DL (ref 12–15.9)
IMM GRANULOCYTES # BLD AUTO: 0.12 10*3/MM3 (ref 0–0.05)
IMM GRANULOCYTES NFR BLD AUTO: 1.1 % (ref 0–0.5)
INR PPP: 1.02 (ref 0.93–1.1)
LYMPHOCYTES # BLD AUTO: 2.66 10*3/MM3 (ref 0.7–3.1)
LYMPHOCYTES NFR BLD AUTO: 23.6 % (ref 19.6–45.3)
MCH RBC QN AUTO: 29.7 PG (ref 26.6–33)
MCHC RBC AUTO-ENTMCNC: 32.8 G/DL (ref 31.5–35.7)
MCV RBC AUTO: 90.6 FL (ref 79–97)
MONOCYTES # BLD AUTO: 0.64 10*3/MM3 (ref 0.1–0.9)
MONOCYTES NFR BLD AUTO: 5.7 % (ref 5–12)
NEUTROPHILS NFR BLD AUTO: 67.3 % (ref 42.7–76)
NEUTROPHILS NFR BLD AUTO: 7.61 10*3/MM3 (ref 1.7–7)
NRBC BLD AUTO-RTO: 0 /100 WBC (ref 0–0.2)
PLATELET # BLD AUTO: 203 10*3/MM3 (ref 140–450)
PMV BLD AUTO: 10.7 FL (ref 6–12)
POTASSIUM SERPL-SCNC: 3.9 MMOL/L (ref 3.5–5.2)
PROCALCITONIN SERPL-MCNC: 0.02 NG/ML (ref 0–0.25)
PROTHROMBIN TIME: 11.1 SECONDS (ref 9.6–11.7)
RBC # BLD AUTO: 4.78 10*6/MM3 (ref 3.77–5.28)
RH BLD: POSITIVE
RSV RNA RESP QL NAA+PROBE: NOT DETECTED
SARS-COV-2 RNA RESP QL NAA+PROBE: NOT DETECTED
SODIUM SERPL-SCNC: 139 MMOL/L (ref 136–145)
T&S EXPIRATION DATE: NORMAL
WBC NRBC COR # BLD AUTO: 11.29 10*3/MM3 (ref 3.4–10.8)

## 2024-04-30 PROCEDURE — 86900 BLOOD TYPING SEROLOGIC ABO: CPT | Performed by: ORTHOPAEDIC SURGERY

## 2024-04-30 PROCEDURE — 86901 BLOOD TYPING SEROLOGIC RH(D): CPT | Performed by: ORTHOPAEDIC SURGERY

## 2024-04-30 PROCEDURE — 82948 REAGENT STRIP/BLOOD GLUCOSE: CPT

## 2024-04-30 PROCEDURE — 86901 BLOOD TYPING SEROLOGIC RH(D): CPT

## 2024-04-30 PROCEDURE — 83880 ASSAY OF NATRIURETIC PEPTIDE: CPT | Performed by: INTERNAL MEDICINE

## 2024-04-30 PROCEDURE — 84145 PROCALCITONIN (PCT): CPT | Performed by: INTERNAL MEDICINE

## 2024-04-30 PROCEDURE — 86850 RBC ANTIBODY SCREEN: CPT | Performed by: ORTHOPAEDIC SURGERY

## 2024-04-30 PROCEDURE — 25010000002 MORPHINE PER 10 MG: Performed by: INTERNAL MEDICINE

## 2024-04-30 PROCEDURE — 73502 X-RAY EXAM HIP UNI 2-3 VIEWS: CPT

## 2024-04-30 PROCEDURE — 80048 BASIC METABOLIC PNL TOTAL CA: CPT | Performed by: PHYSICIAN ASSISTANT

## 2024-04-30 PROCEDURE — 85025 COMPLETE CBC W/AUTO DIFF WBC: CPT | Performed by: PHYSICIAN ASSISTANT

## 2024-04-30 PROCEDURE — 85730 THROMBOPLASTIN TIME PARTIAL: CPT | Performed by: PHYSICIAN ASSISTANT

## 2024-04-30 PROCEDURE — 86900 BLOOD TYPING SEROLOGIC ABO: CPT

## 2024-04-30 PROCEDURE — 63710000001 INSULIN LISPRO (HUMAN) PER 5 UNITS: Performed by: INTERNAL MEDICINE

## 2024-04-30 PROCEDURE — 87637 SARSCOV2&INF A&B&RSV AMP PRB: CPT | Performed by: PHYSICIAN ASSISTANT

## 2024-04-30 PROCEDURE — 85610 PROTHROMBIN TIME: CPT | Performed by: PHYSICIAN ASSISTANT

## 2024-04-30 PROCEDURE — 99285 EMERGENCY DEPT VISIT HI MDM: CPT

## 2024-04-30 PROCEDURE — 93005 ELECTROCARDIOGRAM TRACING: CPT | Performed by: PHYSICIAN ASSISTANT

## 2024-04-30 PROCEDURE — 71045 X-RAY EXAM CHEST 1 VIEW: CPT

## 2024-04-30 RX ORDER — SODIUM CHLORIDE 0.9 % (FLUSH) 0.9 %
10 SYRINGE (ML) INJECTION AS NEEDED
Status: DISCONTINUED | OUTPATIENT
Start: 2024-04-30 | End: 2024-05-03 | Stop reason: HOSPADM

## 2024-04-30 RX ORDER — MORPHINE SULFATE 2 MG/ML
2 INJECTION, SOLUTION INTRAMUSCULAR; INTRAVENOUS EVERY 4 HOURS PRN
Status: DISCONTINUED | OUTPATIENT
Start: 2024-04-30 | End: 2024-05-02

## 2024-04-30 RX ORDER — OXYCODONE HYDROCHLORIDE 5 MG/1
5 TABLET ORAL EVERY 4 HOURS PRN
Status: DISCONTINUED | OUTPATIENT
Start: 2024-04-30 | End: 2024-05-03 | Stop reason: HOSPADM

## 2024-04-30 RX ORDER — TRANEXAMIC ACID 10 MG/ML
1000 INJECTION, SOLUTION INTRAVENOUS ONCE
Status: COMPLETED | OUTPATIENT
Start: 2024-04-30 | End: 2024-04-30

## 2024-04-30 RX ORDER — IBUPROFEN 600 MG/1
1 TABLET ORAL
Status: DISCONTINUED | OUTPATIENT
Start: 2024-04-30 | End: 2024-05-03 | Stop reason: HOSPADM

## 2024-04-30 RX ORDER — IPRATROPIUM BROMIDE AND ALBUTEROL SULFATE 2.5; .5 MG/3ML; MG/3ML
3 SOLUTION RESPIRATORY (INHALATION) EVERY 4 HOURS PRN
Status: DISCONTINUED | OUTPATIENT
Start: 2024-04-30 | End: 2024-05-03 | Stop reason: HOSPADM

## 2024-04-30 RX ORDER — DEXTROSE MONOHYDRATE 25 G/50ML
25 INJECTION, SOLUTION INTRAVENOUS
Status: DISCONTINUED | OUTPATIENT
Start: 2024-04-30 | End: 2024-05-03 | Stop reason: HOSPADM

## 2024-04-30 RX ORDER — INSULIN LISPRO 100 [IU]/ML
2-7 INJECTION, SOLUTION INTRAVENOUS; SUBCUTANEOUS
Status: DISCONTINUED | OUTPATIENT
Start: 2024-04-30 | End: 2024-05-03 | Stop reason: HOSPADM

## 2024-04-30 RX ORDER — NICOTINE POLACRILEX 4 MG
15 LOZENGE BUCCAL
Status: DISCONTINUED | OUTPATIENT
Start: 2024-04-30 | End: 2024-05-03 | Stop reason: HOSPADM

## 2024-04-30 RX ORDER — HYDROCODONE BITARTRATE AND ACETAMINOPHEN 7.5; 325 MG/1; MG/1
1 TABLET ORAL ONCE
Status: COMPLETED | OUTPATIENT
Start: 2024-04-30 | End: 2024-04-30

## 2024-04-30 RX ADMIN — TRANEXAMIC ACID 1000 MG: 10 INJECTION, SOLUTION INTRAVENOUS at 17:37

## 2024-04-30 RX ADMIN — MORPHINE SULFATE 2 MG: 2 INJECTION, SOLUTION INTRAMUSCULAR; INTRAVENOUS at 18:33

## 2024-04-30 RX ADMIN — INSULIN LISPRO 2 UNITS: 100 INJECTION, SOLUTION INTRAVENOUS; SUBCUTANEOUS at 21:40

## 2024-04-30 RX ADMIN — HYDROCODONE BITARTRATE AND ACETAMINOPHEN 1 TABLET: 7.5; 325 TABLET ORAL at 15:15

## 2024-04-30 NOTE — Clinical Note
Level of Care: Med/Surg [1]   Admitting Physician: ELLA NORIEGA [[id number]]   Attending Physician: ELLA NORIEGA [[id number]]

## 2024-04-30 NOTE — H&P
[facility] Medicine Services  History & Physical    Patient Name: Constanza Mccall  : 1975  MRN: [id number]  Primary Care Physician:  Shweta Osorio APRN  Date of admission: 2024  Date and Time of Service: 2024 at 5:30PM    Subjective      Chief Complaint: Hip pain    History of Present Illness: Constanza Mccall is a 49 y.o. white female with multiple medical problems, past history significant for CAD, diabetes, hypertension, hyperlipidemia, peripheral arterial disease.  who presented to [facility] on 2024 with complaints of left hip pain after mechanical fall today.  Patient states that she was trying to walk through grass with her walker, she has a left BKA, she states that the wheel fell off of her walker causing her to fall directly onto her left hip.  She did not hit her head.  She is complaining of pain only to the left hip.  No back pain or knee pain.  She did not fall on her stump.  She does take blood thinners but denies hitting her head or LOC.  She rates her pain a 10/10.     PCP: Shweta Osorio     History provided by:  Patient       Review of Systems   Constitutional:  Negative for chills and fever.   HENT:  Negative for ear pain and hearing loss.    Respiratory:  Negative for apnea and shortness of breath.    Cardiovascular:  Negative for chest pain and palpitations.   Gastrointestinal:  Negative for diarrhea, nausea and vomiting.   Genitourinary:  Negative for dysuria.   Musculoskeletal:  Positive for arthralgias. Negative for back pain and joint swelling.   Neurological:  Negative for seizures, light-headedness and headaches.   Psychiatric/Behavioral:  Negative for confusion and hallucinations.        Personal History     Past Medical History:   Diagnosis Date    Coronary artery disease     Diabetes mellitus     History of echocardiogram 2018    EF 60% Normal LV diastolic filling parameters. There is normal LV wall thickness. Normla trileaflet aortic valve.  Normal mitral valve. Normal LV/RV fucntion with no significant valvulopathy. Normla right anf left pressures estimated.     Hyperlipidemia 12/22/2023    Hypertension 12/22/2023    Neuropathy     Numbness and tingling sensation of skin 06/14/2017    Peripheral artery disease     Rash of hands 06/13/2017    Type 2 diabetes mellitus        Past Surgical History:   Procedure Laterality Date    AMPUTATION DIGIT Left 6/3/2022    Procedure: INCISION AND DRAINAGE WITH AMPUTATION OF LEFT 3RD TOE;  Surgeon: Deonte Heath Jr., DPM;  Location: [facility] MAIN OR;  Service: Podiatry;  Laterality: Left;    CARDIAC CATHETERIZATION N/A 10/15/2019    Procedure: Left Heart Cath;  Surgeon: Merrick Sarmiento MD;  Location: [facility] CATH INVASIVE LOCATION;  Service: Cardiology    CHOLECYSTECTOMY      OOPHORECTOMY         Family History: family history includes Diabetes in her sister; Heart disease in her sister; Hypertension in her father; No Known Problems in her mother. Otherwise pertinent FHx was reviewed and not pertinent to current issue.    Social History:  reports that she has been smoking cigarettes. She has a 50 pack-year smoking history. She has never used smokeless tobacco. She reports that she does not drink alcohol and does not use drugs.    Home Medications:  Prior to Admission Medications       Prescriptions Last Dose Informant Patient Reported? Taking?    clopidogrel (PLAVIX) 75 MG tablet 4/30/2024  No Yes    Take 1 tablet by mouth Daily.    empagliflozin (Jardiance) 10 MG tablet tablet   No Yes    Take 1 tablet by mouth Daily.    metFORMIN (GLUCOPHAGE) 1000 MG tablet 4/30/2024  No Yes    Take 1 tablet by mouth 2 (Two) Times a Day With Meals for 360 days.    Xarelto 2.5 MG tablet 4/30/2024  No Yes    TAKE 1 TABLET BY MOUTH TWICE A DAY WITH MEALS    albuterol sulfate  (90 Base) MCG/ACT inhaler  Self Yes No    Inhale 2 puffs Every 4 (Four) Hours As Needed.    atorvastatin (LIPITOR) 40 MG tablet   No No    Take 1  tablet by mouth Every Night.    citalopram (CeleXA) 20 MG tablet   No No    Take 1 tablet by mouth Daily.    glucose blood test strip   No No    1 each by Other route 4 (Four) Times a Day Before Meals & at Bedtime.    glucose monitor monitoring kit   No No    Use 1 each 4 (Four) Times a Day Before Meals & at Bedtime.    Insulin Glargine (LANTUS SOLOSTAR) 100 UNIT/ML injection pen   No No    Inject 20 Units under the skin into the appropriate area as directed Every Night. Has been out is ordered    Insulin Pen Needle (Pen Needles) 32G X 4 MM misc   No No    Use 1 each Every Night.    Lancets misc   No No    Use 1 each 4 (Four) Times a Day Before Meals & at Bedtime.    lisinopril (PRINIVIL,ZESTRIL) 10 MG tablet   No No    Take 0.5 tablets by mouth Daily.              Allergies:  No Known Allergies    Objective      Vitals:   Temp:  [97.7 °F (36.5 °C)] 97.7 °F (36.5 °C)  Heart Rate:  [66-68] 66  Resp:  [16-20] 16  BP: (115-140)/(65-70) 115/65  Body mass index is 27.62 kg/m².  Physical Exam  Vitals and nursing note reviewed.   Constitutional:       General: She is not in acute distress.     Appearance: She is well-developed. She is not ill-appearing, toxic-appearing or diaphoretic.   HENT:      Head: Normocephalic and atraumatic.      Nose: Nose normal. No congestion or rhinorrhea.      Mouth/Throat:      Mouth: Mucous membranes are moist.      Pharynx: No oropharyngeal exudate.   Eyes:      General: No scleral icterus.        Right eye: No discharge.         Left eye: No discharge.      Extraocular Movements: Extraocular movements intact.      Conjunctiva/sclera: Conjunctivae normal.      Pupils: Pupils are equal, round, and reactive to light.   Neck:      Thyroid: No thyromegaly.      Vascular: No carotid bruit or JVD.      Trachea: No tracheal deviation.   Cardiovascular:      Rate and Rhythm: Normal rate and regular rhythm.      Pulses: Normal pulses.      Heart sounds: Normal heart sounds. No murmur heard.     No  friction rub. No gallop.   Pulmonary:      Effort: Pulmonary effort is normal. No respiratory distress.      Breath sounds: Normal breath sounds. No stridor. No wheezing, rhonchi or rales.   Chest:      Chest wall: No tenderness.   Abdominal:      General: Bowel sounds are normal. There is no distension.      Palpations: Abdomen is soft. There is no mass.      Tenderness: There is no abdominal tenderness. There is no guarding or rebound.      Hernia: No hernia is present.   Musculoskeletal:         General: No swelling, tenderness, deformity or signs of injury. Normal range of motion.      Cervical back: Normal range of motion and neck supple. No rigidity. No muscular tenderness.      Right lower leg: No edema.      Left lower leg: No edema.      Comments: Left hip pain, left below-knee amputation   Lymphadenopathy:      Cervical: No cervical adenopathy.   Skin:     General: Skin is warm and dry.      Coloration: Skin is not jaundiced or pale.      Findings: No bruising, erythema or rash.   Neurological:      General: No focal deficit present.      Mental Status: She is alert and oriented to person, place, and time. Mental status is at baseline.      Cranial Nerves: No cranial nerve deficit.      Sensory: No sensory deficit.      Motor: No weakness or abnormal muscle tone.      Coordination: Coordination normal.   Psychiatric:         Mood and Affect: Mood normal.         Behavior: Behavior normal.         Thought Content: Thought content normal.         Judgment: Judgment normal.         Diagnostic Data:  Lab Results (last 24 hours)       Procedure Component Value Units Date/Time    Protime-INR [[id number]] Collected: 04/30/24 1733    Specimen: Blood Updated: 04/30/24 1737    aPTT [[id number]] Collected: 04/30/24 1733    Specimen: Blood Updated: 04/30/24 1737    Basic Metabolic Panel [[id number]]  (Normal) Collected: 04/30/24 1653    Specimen: Blood Updated: 04/30/24 1721     Glucose 97 mg/dL      BUN 12 mg/dL       Creatinine 0.71 mg/dL      Sodium 139 mmol/L      Potassium 3.9 mmol/L      Chloride 103 mmol/L      CO2 24.0 mmol/L      Calcium 10.2 mg/dL      BUN/Creatinine Ratio 16.9     Anion Gap 12.0 mmol/L      eGFR 104.4 mL/min/1.73     Narrative:      GFR Normal >60  Chronic Kidney Disease <60  Kidney Failure <15      CBC & Differential [[id number]]  (Abnormal) Collected: 04/30/24 1653    Specimen: Blood Updated: 04/30/24 1702    Narrative:      The following orders were created for panel order CBC & Differential.  Procedure                               Abnormality         Status                     ---------                               -----------         ------                     CBC Auto Differential[[id number]]        Abnormal            Final result                 Please view results for these tests on the individual orders.    CBC Auto Differential [[id number]]  (Abnormal) Collected: 04/30/24 1653    Specimen: Blood Updated: 04/30/24 1702     WBC 11.29 10*3/mm3      RBC 4.78 10*6/mm3      Hemoglobin 14.2 g/dL      Hematocrit 43.3 %      MCV 90.6 fL      MCH 29.7 pg      MCHC 32.8 g/dL      RDW 13.2 %      RDW-SD 44.3 fl      MPV 10.7 fL      Platelets 203 10*3/mm3      Neutrophil % 67.3 %      Lymphocyte % 23.6 %      Monocyte % 5.7 %      Eosinophil % 2.0 %      Basophil % 0.3 %      Immature Grans % 1.1 %      Neutrophils, Absolute 7.61 10*3/mm3      Lymphocytes, Absolute 2.66 10*3/mm3      Monocytes, Absolute 0.64 10*3/mm3      Eosinophils, Absolute 0.23 10*3/mm3      Basophils, Absolute 0.03 10*3/mm3      Immature Grans, Absolute 0.12 10*3/mm3      nRBC 0.0 /100 WBC              Imaging Results (Last 24 Hours)       Procedure Component Value Units Date/Time    XR Chest 1 View [[id number]] Collected: 04/30/24 1607     Updated: 04/30/24 1610    Narrative:      XR CHEST 1 VW    Date of Exam: 4/30/2024 4:05 PM EDT    Indication: pre op hip frac    Comparison: AP portable chest 3/12/2021    Findings:  No acute  airspace disease. Normal heart size. No pleural effusion, pneumothorax or acute osseous abnormality. Benign calcified nodule in the perihilar left lung.      Impression:      Impression:  No acute chest finding.      Electronically Signed: Christina Wills MD    4/30/2024 4:08 PM EDT    Workstation ID: [id number]    XR Hip With or Without Pelvis 2 - 3 View Left [[id number]] Collected: 04/30/24 1545     Updated: 04/30/24 1549    Narrative:      XR HIP W OR WO PELVIS 2-3 VIEW LEFT    Date of Exam: 4/30/2024 3:25 PM EDT    Indication: fall    Comparison: None available.    Findings:  There is an incomplete, nondisplaced fracture of the left femoral neck, with fracture line seen at its medial cortical margin. There is no hip dislocation.    There is moderate degenerative change of the left hip with slightly out of round configuration of the femoral head and femoral head marginal osteophyte formation. The acetabulum appears within normal limits. Normal appearance of the right hip. Sacroiliac   joints are normal. Imaged lower lumbar spine is normal.        Impression:      1. Incomplete nondisplaced fracture of the left femoral neck. No dislocation    2. Moderate degenerative change of the left hip.    Electronically Signed: Christina Wills MD    4/30/2024 3:47 PM EDT    Workstation ID: [id number]              Assessment & Plan        This is a 49 y.o. female with:    Active and Resolved Problems  Active Hospital Problems    Diagnosis  POA    **Closed fracture of neck of left femur [S72.002A]  Unknown    Hypertension [I10]  Yes    Hyperlipidemia [E78.5]  Yes    CAD (coronary artery disease) [I25.10]  Yes    Peripheral vascular disease [I73.9]  Yes    Type 2 diabetes mellitus with complication, without long-term current use of insulin [E11.8]  Yes    Neuropathy [G62.9]  Yes      Resolved Hospital Problems   No resolved problems to display.     Left femoral neck fracture  Plan  Admit to [facility]  Orthopedic consult  Pain  management  PT OT  consult  DVT prophylaxis  N.p.o. after midnight  Hold Xarelto, Plavix    Hypertension chronic essential continue lisinopril monitor blood pressure closely  Hyperlipidemia continue statins check CK and lipid panel  Type 2 diabetes hold p.o. meds regular insulin sliding scale continue Lantus  Leukocytosis, likely reactive, check procalcitonin check UA CNS monitor CBC  Depression chronic continue Celexa    DVT prophylaxis:  No DVT prophylaxis order currently exists.        The patient desires to be as follows:    CODE STATUS:           Padmini Souza [phone] (Mobile) , who can be contacted, is the designated person to make medical decisions on the patient's behalf if She is incapable of doing so. This was clarified with patient and/or next of kin on 4/30/2024 during the course of this H&P.    Admission Status:  I believe this patient meets ADMIT status.    Expected Length of Stay: 4DAYS    PDMP and Medication Dispenses via Sidebar reviewed and consistent with patient reported medications.    I discussed the patient's findings and my recommendations with patient.      Signature:     This document has been electronically signed by Lamonte Cummings MD on April 30, 2024 17:38 EDT   [facility] Hospitalist Team

## 2024-04-30 NOTE — LETTER
EMS Transport Request  For use at [facility], [place], [place], [place], and [place] only   Patient Name: Constanza Mccall : 1975   Weight:84.8 kg (186 lb 15.2 oz) Pick-up Location: [facility] BLS/ALS: BLS/ALS: BLS   Insurance: [employer] [employer] Auth End Date: 5/3/24   Pre-Cert #: D/C Summary complete:    Destination: Other [place], SNF   Contact Precautions: None   Equipment (O2, Fluids, etc.): None   Arrive By Date/Time: 5/3/24 Stretcher/WC: Wheelchair   CM Requesting: Jackelyn Cheatham RN Ext: [phone]   Notes/Medical Necessity: WC VAN - dc order in/pt ready     ______________________________________________________________________    *Only 2 patient bags OR 1 carry-on size bag are permitted.  Wheelchairs and walkers CANNOT transported with the patient. Acknowledge: Yes

## 2024-04-30 NOTE — ED PROVIDER NOTES
Subjective   History of Present Illness  Chief Complaint: Hip pain    Patient is a 49-year-old female history of CAD diabetes hypertension presents to the ER with complaints of left hip pain after mechanical fall today.  Patient states that she was trying to walk through grass with her walker, she has a left BKA, she states that the wheel fell off of her walker causing her to fall directly onto her left hip.  She did not hit her head.  She is complaining of pain only to the left hip.  No back pain or knee pain.  She did not fall on her stump.  She does take blood thinners but denies hitting her head or LOC.  She rates her pain a 10/10.    PCP: Shweta Osorio    History provided by:  Patient      Review of Systems   Constitutional:  Negative for fever.   HENT:  Negative for sore throat and trouble swallowing.    Eyes: Negative.    Respiratory:  Negative for shortness of breath and wheezing.    Cardiovascular:  Negative for chest pain.   Gastrointestinal:  Negative for abdominal pain, diarrhea, nausea and vomiting.   Endocrine: Negative.    Genitourinary:  Negative for dysuria.   Musculoskeletal:  Positive for arthralgias. Negative for myalgias.        Left hip pain   Skin:  Negative for rash.   Allergic/Immunologic: Negative.    Neurological:  Negative for headaches.   Psychiatric/Behavioral:  Negative for behavioral problems.    All other systems reviewed and are negative.      Past Medical History:   Diagnosis Date    Coronary artery disease     Diabetes mellitus     History of echocardiogram 12/17/2018    EF 60% Normal LV diastolic filling parameters. There is normal LV wall thickness. Normla trileaflet aortic valve. Normal mitral valve. Normal LV/RV fucntion with no significant valvulopathy. Normla right anf left pressures estimated.     Hyperlipidemia 12/22/2023    Hypertension 12/22/2023    Neuropathy     Numbness and tingling sensation of skin 06/14/2017    Peripheral artery disease     Rash of hands 06/13/2017     Type 2 diabetes mellitus        No Known Allergies    Past Surgical History:   Procedure Laterality Date    AMPUTATION DIGIT Left 6/3/2022    Procedure: INCISION AND DRAINAGE WITH AMPUTATION OF LEFT 3RD TOE;  Surgeon: Deonte Heath Jr., DPM;  Location: [facility] MAIN OR;  Service: Podiatry;  Laterality: Left;    CARDIAC CATHETERIZATION N/A 10/15/2019    Procedure: Left Heart Cath;  Surgeon: Merrick Sarmiento MD;  Location: [facility] CATH INVASIVE LOCATION;  Service: Cardiology    CHOLECYSTECTOMY      OOPHORECTOMY         Family History   Problem Relation Age of Onset    No Known Problems Mother     Hypertension Father     Diabetes Sister     Heart disease Sister        Social History     Socioeconomic History    Marital status:      Spouse name: Sergey    Number of children: 2    Years of education: 12   Tobacco Use    Smoking status: Every Day     Current packs/day: 2.50     Average packs/day: 2.5 packs/day for 20.0 years (50.0 ttl pk-yrs)     Types: Cigarettes    Smokeless tobacco: Never    Tobacco comments:     cut down quit none dos   Vaping Use    Vaping status: Never Used   Substance and Sexual Activity    Alcohol use: No    Drug use: No    Sexual activity: Defer           Objective   Physical Exam  Vitals and nursing note reviewed.   Constitutional:       Appearance: Normal appearance. She is well-developed and normal weight. She is not ill-appearing or toxic-appearing.   HENT:      Head: Normocephalic and atraumatic.   Eyes:      Pupils: Pupils are equal, round, and reactive to light.   Cardiovascular:      Rate and Rhythm: Normal rate and regular rhythm.      Pulses: Normal pulses.      Heart sounds: Normal heart sounds.   Pulmonary:      Effort: Pulmonary effort is normal. No respiratory distress.      Breath sounds: Normal breath sounds. No wheezing.   Musculoskeletal:         General: Tenderness present. No deformity or signs of injury.      Comments: Lumbar spine: No step-offs or deformities, no  "midline tenderness to palpation.  Tenderness extending into the left hip and buttocks.  Bilateral lower extremities: Positive left straight leg raise.  5 out of 5 strength.  Sensation intact to light touch.  2+ reflexes.  No clonus.  Negative Babinski sign.    Patient has left BKA  No tenderness to left knee or the left stump   Skin:     General: Skin is warm and dry.      Capillary Refill: Capillary refill takes less than 2 seconds.      Findings: No bruising or rash.   Neurological:      General: No focal deficit present.      Mental Status: She is alert and oriented to person, place, and time.   Psychiatric:         Mood and Affect: Mood normal.         Behavior: Behavior normal.         Procedures           ED Course  ED Course as of 04/30/24 1624 Tue Apr 30, 2024   1558 Spoke with Dr. Solis regarding abnormal x-ray finding and left hip fracture.  Will admit to the hospitalist []      ED Course User Index  [] Marilyn Newman PA    /65 (BP Location: Right arm, Patient Position: Lying)   Pulse 66   Temp 97.7 °F (36.5 °C)   Resp 16   Ht 175.3 cm (69\")   Wt 84.8 kg (187 lb)   LMP  (LMP Unknown)   SpO2 95%   BMI 27.62 kg/m²   Labs Reviewed   BASIC METABOLIC PANEL   PROTIME-INR   APTT   CBC WITH AUTO DIFFERENTIAL   CBC AND DIFFERENTIAL    Narrative:     The following orders were created for panel order CBC & Differential.  Procedure                               Abnormality         Status                     ---------                               -----------         ------                     CBC Auto Differential[[id number]]                                                         Please view results for these tests on the individual orders.     Medications   sodium chloride 0.9 % flush 10 mL (has no administration in time range)   HYDROcodone-acetaminophen (NORCO) 7.5-325 MG per tablet 1 tablet (1 tablet Oral Given 4/30/24 1515)     XR Chest 1 View    Result Date: 4/30/2024  Impression: No acute " chest finding. Electronically Signed: Christina Wills MD  4/30/2024 4:08 PM EDT  Workstation ID: [id number]    XR Hip With or Without Pelvis 2 - 3 View Left    Result Date: 4/30/2024  1. Incomplete nondisplaced fracture of the left femoral neck. No dislocation 2. Moderate degenerative change of the left hip. Electronically Signed: Christina Wills MD  4/30/2024 3:47 PM EDT  Workstation ID: [id number]                                            Medical Decision Making  Differential Dx (Includes but not limited to): Fracture contusion dislocation  Medical Records Reviewed: Patient has BKA left leg due to nonhealing ulcer of the toe on the left foot.  Initial surgery done by Dr. Heath  Labs: Pending  Imaging: Imaging was reviewed by myself, independently interpreted by Dr. Tahir Thomas left femoral neck fracture  Telemetry: Ordered for preop, pending  Testing considered but not ordered: CT head patient denies head injury or LOC  Nature of Complaint: Acute  Admission vs Discharge: Admission      Discussion: While in the ED IV was placed and labs were obtained appropriate PPE was worn during exam and throughout all encounters with the patient.  Patient afebrile nontoxic appearance in no acute respiratory distress.  She has no evidence of head or facial trauma.  Only complaining of hip pain.  She was initially given a pain pill, x-ray imaging did show incomplete nondisplaced fracture of the left femoral neck.  She has BKA of this leg already.  IV established and preop blood work chest x-ray and EKG were obtained.  I spoke with Dr. Solis regarding abnormal x-ray and plan for hospitalist admission.  Spoke with Dr. Cummings regarding admission    Based on the clinical findings at this time I anticipate that the patient will require 2 midnight stay.    Case discussed with ER attending, Dr. Tahir Thomas      Problems Addressed:  Closed fracture of neck of left femur, initial encounter: complicated acute illness or injury  Fall, initial  encounter: complicated acute illness or injury  Left hip pain: complicated acute illness or injury    Amount and/or Complexity of Data Reviewed  Labs: ordered. Decision-making details documented in ED Course.  Radiology: ordered. Decision-making details documented in ED Course.  ECG/medicine tests: ordered.    Risk  Prescription drug management.  Decision regarding hospitalization.        Final diagnoses:   Closed fracture of neck of left femur, initial encounter   Left hip pain   Fall, initial encounter       ED Disposition  ED Disposition       ED Disposition   Decision to Admit    Condition   --    Comment   Level of Care: Med/Surg [1]   Admitting Physician: ELLA NORIEGA [[id number]]   Attending Physician: ELLA NORIEGA [[id number]]                 No follow-up provider specified.       Medication List      No changes were made to your prescriptions during this visit.            Marilyn Newman PA  04/30/24 [id number]

## 2024-05-01 ENCOUNTER — APPOINTMENT (OUTPATIENT)
Dept: GENERAL RADIOLOGY | Facility: HOSPITAL | Age: 49
End: 2024-05-01
Payer: MEDICAID

## 2024-05-01 ENCOUNTER — ANESTHESIA EVENT (OUTPATIENT)
Dept: PERIOP | Facility: HOSPITAL | Age: 49
End: 2024-05-01
Payer: MEDICAID

## 2024-05-01 ENCOUNTER — ANESTHESIA (OUTPATIENT)
Dept: PERIOP | Facility: HOSPITAL | Age: 49
End: 2024-05-01
Payer: MEDICAID

## 2024-05-01 LAB
ALBUMIN SERPL-MCNC: 3.9 G/DL (ref 3.5–5.2)
ALBUMIN/GLOB SERPL: 1.6 G/DL
ALP SERPL-CCNC: 74 U/L (ref 39–117)
ALT SERPL W P-5'-P-CCNC: 18 U/L (ref 1–33)
ANION GAP SERPL CALCULATED.3IONS-SCNC: 12 MMOL/L (ref 5–15)
AST SERPL-CCNC: 18 U/L (ref 1–32)
BILIRUB SERPL-MCNC: 0.3 MG/DL (ref 0–1.2)
BUN SERPL-MCNC: 12 MG/DL (ref 6–20)
BUN/CREAT SERPL: 16.9 (ref 7–25)
CA-I SERPL ISE-MCNC: 1.18 MMOL/L (ref 1.2–1.3)
CALCIUM SPEC-SCNC: 9 MG/DL (ref 8.6–10.5)
CHLORIDE SERPL-SCNC: 103 MMOL/L (ref 98–107)
CHOLEST SERPL-MCNC: 107 MG/DL (ref 0–200)
CK SERPL-CCNC: 330 U/L (ref 20–180)
CO2 SERPL-SCNC: 22 MMOL/L (ref 22–29)
CREAT SERPL-MCNC: 0.71 MG/DL (ref 0.57–1)
EGFRCR SERPLBLD CKD-EPI 2021: 104.4 ML/MIN/1.73
GEN 5 2HR TROPONIN T REFLEX: 36 NG/L
GLOBULIN UR ELPH-MCNC: 2.5 GM/DL
GLUCOSE BLDC GLUCOMTR-MCNC: 104 MG/DL (ref 70–105)
GLUCOSE BLDC GLUCOMTR-MCNC: 170 MG/DL (ref 70–105)
GLUCOSE BLDC GLUCOMTR-MCNC: 187 MG/DL (ref 70–105)
GLUCOSE BLDC GLUCOMTR-MCNC: 245 MG/DL (ref 70–105)
GLUCOSE SERPL-MCNC: 183 MG/DL (ref 65–99)
HBA1C MFR BLD: 6.2 % (ref 4.8–5.6)
HDLC SERPL-MCNC: 30 MG/DL (ref 40–60)
LDLC SERPL CALC-MCNC: 57 MG/DL (ref 0–100)
LDLC/HDLC SERPL: 1.87 {RATIO}
MAGNESIUM SERPL-MCNC: 1.8 MG/DL (ref 1.6–2.6)
NT-PROBNP SERPL-MCNC: 54.5 PG/ML (ref 0–450)
POTASSIUM SERPL-SCNC: 4.3 MMOL/L (ref 3.5–5.2)
PROT SERPL-MCNC: 6.4 G/DL (ref 6–8.5)
QT INTERVAL: 460 MS
QTC INTERVAL: 441 MS
SODIUM SERPL-SCNC: 137 MMOL/L (ref 136–145)
TRIGL SERPL-MCNC: 104 MG/DL (ref 0–150)
TROPONIN T DELTA: -6 NG/L
TROPONIN T SERPL HS-MCNC: 42 NG/L
TSH SERPL DL<=0.05 MIU/L-ACNC: 1.96 UIU/ML (ref 0.27–4.2)
VLDLC SERPL-MCNC: 20 MG/DL (ref 5–40)

## 2024-05-01 PROCEDURE — 25010000002 FENTANYL CITRATE (PF) 50 MCG/ML SOLUTION

## 2024-05-01 PROCEDURE — P9100 PATHOGEN TEST FOR PLATELETS: HCPCS

## 2024-05-01 PROCEDURE — 84443 ASSAY THYROID STIM HORMONE: CPT | Performed by: INTERNAL MEDICINE

## 2024-05-01 PROCEDURE — 25010000002 DEXAMETHASONE PER 1 MG

## 2024-05-01 PROCEDURE — 25010000002 ROPIVACAINE PER 1 MG: Performed by: ORTHOPAEDIC SURGERY

## 2024-05-01 PROCEDURE — 0SRB0JZ REPLACEMENT OF LEFT HIP JOINT WITH SYNTHETIC SUBSTITUTE, OPEN APPROACH: ICD-10-PCS | Performed by: ORTHOPAEDIC SURGERY

## 2024-05-01 PROCEDURE — 25010000002 FENTANYL CITRATE (PF) 100 MCG/2ML SOLUTION

## 2024-05-01 PROCEDURE — 82948 REAGENT STRIP/BLOOD GLUCOSE: CPT

## 2024-05-01 PROCEDURE — 25010000002 CEFAZOLIN PER 500 MG: Performed by: ORTHOPAEDIC SURGERY

## 2024-05-01 PROCEDURE — 83036 HEMOGLOBIN GLYCOSYLATED A1C: CPT | Performed by: INTERNAL MEDICINE

## 2024-05-01 PROCEDURE — 25010000002 ONDANSETRON PER 1 MG

## 2024-05-01 PROCEDURE — 84484 ASSAY OF TROPONIN QUANT: CPT | Performed by: INTERNAL MEDICINE

## 2024-05-01 PROCEDURE — 25010000002 KETOROLAC TROMETHAMINE PER 15 MG: Performed by: ORTHOPAEDIC SURGERY

## 2024-05-01 PROCEDURE — 83735 ASSAY OF MAGNESIUM: CPT | Performed by: INTERNAL MEDICINE

## 2024-05-01 PROCEDURE — 25010000002 PROPOFOL 200 MG/20ML EMULSION

## 2024-05-01 PROCEDURE — 25810000003 LACTATED RINGERS PER 1000 ML: Performed by: ANESTHESIOLOGY

## 2024-05-01 PROCEDURE — 97162 PT EVAL MOD COMPLEX 30 MIN: CPT

## 2024-05-01 PROCEDURE — 80061 LIPID PANEL: CPT | Performed by: INTERNAL MEDICINE

## 2024-05-01 PROCEDURE — 73501 X-RAY EXAM HIP UNI 1 VIEW: CPT

## 2024-05-01 PROCEDURE — 82330 ASSAY OF CALCIUM: CPT | Performed by: INTERNAL MEDICINE

## 2024-05-01 PROCEDURE — 63710000001 INSULIN GLARGINE PER 5 UNITS: Performed by: ORTHOPAEDIC SURGERY

## 2024-05-01 PROCEDURE — C1713 ANCHOR/SCREW BN/BN,TIS/BN: HCPCS | Performed by: ORTHOPAEDIC SURGERY

## 2024-05-01 PROCEDURE — 25010000002 HYDROMORPHONE 1 MG/ML SOLUTION

## 2024-05-01 PROCEDURE — 25010000002 EPINEPHRINE 1 MG/ML SOLUTION: Performed by: ORTHOPAEDIC SURGERY

## 2024-05-01 PROCEDURE — P9035 PLATELET PHERES LEUKOREDUCED: HCPCS

## 2024-05-01 PROCEDURE — 76000 FLUOROSCOPY <1 HR PHYS/QHP: CPT

## 2024-05-01 PROCEDURE — 25010000002 VANCOMYCIN 1 G RECONSTITUTED SOLUTION: Performed by: ORTHOPAEDIC SURGERY

## 2024-05-01 PROCEDURE — 82550 ASSAY OF CK (CPK): CPT | Performed by: INTERNAL MEDICINE

## 2024-05-01 PROCEDURE — 25010000002 CLONIDINE PER 1 MG: Performed by: ORTHOPAEDIC SURGERY

## 2024-05-01 PROCEDURE — 82948 REAGENT STRIP/BLOOD GLUCOSE: CPT | Performed by: INTERNAL MEDICINE

## 2024-05-01 PROCEDURE — C1776 JOINT DEVICE (IMPLANTABLE): HCPCS | Performed by: ORTHOPAEDIC SURGERY

## 2024-05-01 PROCEDURE — 25010000002 SUGAMMADEX 200 MG/2ML SOLUTION

## 2024-05-01 PROCEDURE — 36430 TRANSFUSION BLD/BLD COMPNT: CPT

## 2024-05-01 PROCEDURE — 63710000001 INSULIN LISPRO (HUMAN) PER 5 UNITS: Performed by: ORTHOPAEDIC SURGERY

## 2024-05-01 PROCEDURE — 80053 COMPREHEN METABOLIC PANEL: CPT | Performed by: INTERNAL MEDICINE

## 2024-05-01 PROCEDURE — 25010000002 MORPHINE PER 10 MG: Performed by: INTERNAL MEDICINE

## 2024-05-01 DEVICE — R3 3 HOLE ACETABULAR SHELL 54MM
Type: IMPLANTABLE DEVICE | Site: HIP | Status: FUNCTIONAL
Brand: R3 ACETABULAR

## 2024-05-01 DEVICE — DEV CONTRL TISS STRATAFIX SPIRAL MNCRYL UD 3/0 PLS 30CM: Type: IMPLANTABLE DEVICE | Site: HIP | Status: FUNCTIONAL

## 2024-05-01 DEVICE — OR3O DUAL MOBILITY LINER 42/54
Type: IMPLANTABLE DEVICE | Site: HIP | Status: FUNCTIONAL
Brand: OR3O DUAL MOBILITY

## 2024-05-01 DEVICE — IMPLANTABLE DEVICE: Type: IMPLANTABLE DEVICE | Site: HIP | Status: FUNCTIONAL

## 2024-05-01 DEVICE — OXINIUM FEMORAL HEAD 12/14 TAPER                                    28 MM +8
Type: IMPLANTABLE DEVICE | Site: HIP | Status: FUNCTIONAL
Brand: OXINIUM

## 2024-05-01 DEVICE — POLARSTEM STANDARD NON-CEMENTED                                    WITH TI/HA 7
Type: IMPLANTABLE DEVICE | Site: HIP | Status: FUNCTIONAL
Brand: POLARSTEM

## 2024-05-01 DEVICE — OR3O DUAL MOBILITY XLPE INSERT 28/42
Type: IMPLANTABLE DEVICE | Site: HIP | Status: FUNCTIONAL
Brand: OR3O DUAL MOBILITY

## 2024-05-01 RX ORDER — BISACODYL 5 MG/1
5 TABLET, DELAYED RELEASE ORAL DAILY PRN
Status: DISCONTINUED | OUTPATIENT
Start: 2024-05-01 | End: 2024-05-03 | Stop reason: HOSPADM

## 2024-05-01 RX ORDER — ACETAMINOPHEN 325 MG/1
650 TABLET ORAL ONCE AS NEEDED
Status: DISCONTINUED | OUTPATIENT
Start: 2024-05-01 | End: 2024-05-01 | Stop reason: HOSPADM

## 2024-05-01 RX ORDER — FENTANYL CITRATE 50 UG/ML
INJECTION, SOLUTION INTRAMUSCULAR; INTRAVENOUS AS NEEDED
Status: DISCONTINUED | OUTPATIENT
Start: 2024-05-01 | End: 2024-05-01 | Stop reason: SURG

## 2024-05-01 RX ORDER — SODIUM CHLORIDE 9 MG/ML
40 INJECTION, SOLUTION INTRAVENOUS AS NEEDED
Status: DISCONTINUED | OUTPATIENT
Start: 2024-05-01 | End: 2024-05-03 | Stop reason: HOSPADM

## 2024-05-01 RX ORDER — NITROGLYCERIN 0.4 MG/1
0.4 TABLET SUBLINGUAL
Status: DISCONTINUED | OUTPATIENT
Start: 2024-05-01 | End: 2024-05-03 | Stop reason: HOSPADM

## 2024-05-01 RX ORDER — FENTANYL CITRATE 50 UG/ML
50 INJECTION, SOLUTION INTRAMUSCULAR; INTRAVENOUS
Status: DISCONTINUED | OUTPATIENT
Start: 2024-05-01 | End: 2024-05-01 | Stop reason: HOSPADM

## 2024-05-01 RX ORDER — FLUMAZENIL 0.1 MG/ML
0.1 INJECTION INTRAVENOUS AS NEEDED
Status: DISCONTINUED | OUTPATIENT
Start: 2024-05-01 | End: 2024-05-01 | Stop reason: HOSPADM

## 2024-05-01 RX ORDER — HYDRALAZINE HYDROCHLORIDE 20 MG/ML
5 INJECTION INTRAMUSCULAR; INTRAVENOUS
Status: DISCONTINUED | OUTPATIENT
Start: 2024-05-01 | End: 2024-05-01 | Stop reason: HOSPADM

## 2024-05-01 RX ORDER — POLYETHYLENE GLYCOL 3350 17 G/17G
17 POWDER, FOR SOLUTION ORAL DAILY PRN
Status: DISCONTINUED | OUTPATIENT
Start: 2024-05-01 | End: 2024-05-03 | Stop reason: HOSPADM

## 2024-05-01 RX ORDER — ONDANSETRON 2 MG/ML
4 INJECTION INTRAMUSCULAR; INTRAVENOUS EVERY 6 HOURS PRN
Status: DISCONTINUED | OUTPATIENT
Start: 2024-05-01 | End: 2024-05-03 | Stop reason: HOSPADM

## 2024-05-01 RX ORDER — CLOPIDOGREL BISULFATE 75 MG/1
75 TABLET ORAL DAILY
Status: DISCONTINUED | OUTPATIENT
Start: 2024-05-01 | End: 2024-05-03 | Stop reason: HOSPADM

## 2024-05-01 RX ORDER — ROCURONIUM BROMIDE 10 MG/ML
INJECTION, SOLUTION INTRAVENOUS AS NEEDED
Status: DISCONTINUED | OUTPATIENT
Start: 2024-05-01 | End: 2024-05-01 | Stop reason: SURG

## 2024-05-01 RX ORDER — EPHEDRINE SULFATE 5 MG/ML
INJECTION INTRAVENOUS AS NEEDED
Status: DISCONTINUED | OUTPATIENT
Start: 2024-05-01 | End: 2024-05-01 | Stop reason: SURG

## 2024-05-01 RX ORDER — ONDANSETRON 4 MG/1
4 TABLET, ORALLY DISINTEGRATING ORAL EVERY 6 HOURS PRN
Status: DISCONTINUED | OUTPATIENT
Start: 2024-05-01 | End: 2024-05-03 | Stop reason: HOSPADM

## 2024-05-01 RX ORDER — SODIUM CHLORIDE 0.9 % (FLUSH) 0.9 %
10 SYRINGE (ML) INJECTION AS NEEDED
Status: DISCONTINUED | OUTPATIENT
Start: 2024-05-01 | End: 2024-05-03 | Stop reason: HOSPADM

## 2024-05-01 RX ORDER — LABETALOL HYDROCHLORIDE 5 MG/ML
5 INJECTION, SOLUTION INTRAVENOUS
Status: DISCONTINUED | OUTPATIENT
Start: 2024-05-01 | End: 2024-05-01 | Stop reason: HOSPADM

## 2024-05-01 RX ORDER — LISINOPRIL 5 MG/1
5 TABLET ORAL DAILY
Status: DISCONTINUED | OUTPATIENT
Start: 2024-05-01 | End: 2024-05-03 | Stop reason: HOSPADM

## 2024-05-01 RX ORDER — OXYCODONE HYDROCHLORIDE 5 MG/1
15 TABLET ORAL EVERY 4 HOURS PRN
Status: DISCONTINUED | OUTPATIENT
Start: 2024-05-01 | End: 2024-05-01 | Stop reason: HOSPADM

## 2024-05-01 RX ORDER — VANCOMYCIN HYDROCHLORIDE 1 G/20ML
INJECTION, POWDER, LYOPHILIZED, FOR SOLUTION INTRAVENOUS AS NEEDED
Status: DISCONTINUED | OUTPATIENT
Start: 2024-05-01 | End: 2024-05-01 | Stop reason: HOSPADM

## 2024-05-01 RX ORDER — BISACODYL 10 MG
10 SUPPOSITORY, RECTAL RECTAL DAILY PRN
Status: DISCONTINUED | OUTPATIENT
Start: 2024-05-01 | End: 2024-05-03 | Stop reason: HOSPADM

## 2024-05-01 RX ORDER — AMOXICILLIN 250 MG
2 CAPSULE ORAL 2 TIMES DAILY PRN
Status: DISCONTINUED | OUTPATIENT
Start: 2024-05-01 | End: 2024-05-03 | Stop reason: HOSPADM

## 2024-05-01 RX ORDER — ALBUTEROL SULFATE 2.5 MG/3ML
2.5 SOLUTION RESPIRATORY (INHALATION) ONCE AS NEEDED
Status: DISCONTINUED | OUTPATIENT
Start: 2024-05-01 | End: 2024-05-01 | Stop reason: HOSPADM

## 2024-05-01 RX ORDER — DIPHENHYDRAMINE HYDROCHLORIDE 50 MG/ML
12.5 INJECTION INTRAMUSCULAR; INTRAVENOUS
Status: DISCONTINUED | OUTPATIENT
Start: 2024-05-01 | End: 2024-05-01 | Stop reason: HOSPADM

## 2024-05-01 RX ORDER — PROCHLORPERAZINE EDISYLATE 5 MG/ML
10 INJECTION INTRAMUSCULAR; INTRAVENOUS ONCE AS NEEDED
Status: DISCONTINUED | OUTPATIENT
Start: 2024-05-01 | End: 2024-05-01 | Stop reason: HOSPADM

## 2024-05-01 RX ORDER — NALOXONE HCL 0.4 MG/ML
0.4 VIAL (ML) INJECTION AS NEEDED
Status: DISCONTINUED | OUTPATIENT
Start: 2024-05-01 | End: 2024-05-01 | Stop reason: HOSPADM

## 2024-05-01 RX ORDER — ATORVASTATIN CALCIUM 40 MG/1
40 TABLET, FILM COATED ORAL NIGHTLY
Status: DISCONTINUED | OUTPATIENT
Start: 2024-05-01 | End: 2024-05-03 | Stop reason: HOSPADM

## 2024-05-01 RX ORDER — OXYCODONE HYDROCHLORIDE 5 MG/1
5 TABLET ORAL ONCE AS NEEDED
Status: DISCONTINUED | OUTPATIENT
Start: 2024-05-01 | End: 2024-05-01 | Stop reason: HOSPADM

## 2024-05-01 RX ORDER — SODIUM CHLORIDE, SODIUM LACTATE, POTASSIUM CHLORIDE, CALCIUM CHLORIDE 600; 310; 30; 20 MG/100ML; MG/100ML; MG/100ML; MG/100ML
1000 INJECTION, SOLUTION INTRAVENOUS CONTINUOUS
Status: DISCONTINUED | OUTPATIENT
Start: 2024-05-01 | End: 2024-05-02

## 2024-05-01 RX ORDER — ACETAMINOPHEN 650 MG/1
325 SUPPOSITORY RECTAL EVERY 4 HOURS PRN
Status: DISCONTINUED | OUTPATIENT
Start: 2024-05-01 | End: 2024-05-01 | Stop reason: HOSPADM

## 2024-05-01 RX ORDER — TRANEXAMIC ACID 10 MG/ML
1000 INJECTION, SOLUTION INTRAVENOUS ONCE
Status: COMPLETED | OUTPATIENT
Start: 2024-05-01 | End: 2024-05-01

## 2024-05-01 RX ORDER — DEXAMETHASONE SODIUM PHOSPHATE 4 MG/ML
INJECTION, SOLUTION INTRA-ARTICULAR; INTRALESIONAL; INTRAMUSCULAR; INTRAVENOUS; SOFT TISSUE AS NEEDED
Status: DISCONTINUED | OUTPATIENT
Start: 2024-05-01 | End: 2024-05-01 | Stop reason: SURG

## 2024-05-01 RX ORDER — PHENYLEPHRINE HCL IN 0.9% NACL 1 MG/10 ML
SYRINGE (ML) INTRAVENOUS AS NEEDED
Status: DISCONTINUED | OUTPATIENT
Start: 2024-05-01 | End: 2024-05-01 | Stop reason: SURG

## 2024-05-01 RX ORDER — SODIUM CHLORIDE 0.9 % (FLUSH) 0.9 %
10 SYRINGE (ML) INJECTION EVERY 12 HOURS SCHEDULED
Status: DISCONTINUED | OUTPATIENT
Start: 2024-05-01 | End: 2024-05-03 | Stop reason: HOSPADM

## 2024-05-01 RX ORDER — ONDANSETRON 2 MG/ML
4 INJECTION INTRAMUSCULAR; INTRAVENOUS ONCE AS NEEDED
Status: DISCONTINUED | OUTPATIENT
Start: 2024-05-01 | End: 2024-05-01 | Stop reason: HOSPADM

## 2024-05-01 RX ORDER — ONDANSETRON 2 MG/ML
INJECTION INTRAMUSCULAR; INTRAVENOUS AS NEEDED
Status: DISCONTINUED | OUTPATIENT
Start: 2024-05-01 | End: 2024-05-01 | Stop reason: SURG

## 2024-05-01 RX ORDER — PROPOFOL 10 MG/ML
INJECTION, EMULSION INTRAVENOUS AS NEEDED
Status: DISCONTINUED | OUTPATIENT
Start: 2024-05-01 | End: 2024-05-01 | Stop reason: SURG

## 2024-05-01 RX ORDER — SODIUM CHLORIDE 0.9 % (FLUSH) 0.9 %
10 SYRINGE (ML) INJECTION AS NEEDED
Status: DISCONTINUED | OUTPATIENT
Start: 2024-05-01 | End: 2024-05-01 | Stop reason: HOSPADM

## 2024-05-01 RX ORDER — CITALOPRAM 20 MG/1
20 TABLET ORAL DAILY
Status: DISCONTINUED | OUTPATIENT
Start: 2024-05-01 | End: 2024-05-03 | Stop reason: HOSPADM

## 2024-05-01 RX ORDER — FENTANYL CITRATE 50 UG/ML
25 INJECTION, SOLUTION INTRAMUSCULAR; INTRAVENOUS
Status: DISCONTINUED | OUTPATIENT
Start: 2024-05-01 | End: 2024-05-01 | Stop reason: HOSPADM

## 2024-05-01 RX ORDER — LIDOCAINE HYDROCHLORIDE 20 MG/ML
INJECTION, SOLUTION EPIDURAL; INFILTRATION; INTRACAUDAL; PERINEURAL AS NEEDED
Status: DISCONTINUED | OUTPATIENT
Start: 2024-05-01 | End: 2024-05-01 | Stop reason: SURG

## 2024-05-01 RX ORDER — ALBUTEROL SULFATE 2.5 MG/3ML
2.5 SOLUTION RESPIRATORY (INHALATION) EVERY 4 HOURS PRN
Status: DISCONTINUED | OUTPATIENT
Start: 2024-05-01 | End: 2024-05-03 | Stop reason: HOSPADM

## 2024-05-01 RX ADMIN — SUGAMMADEX 200 MG: 100 INJECTION, SOLUTION INTRAVENOUS at 12:15

## 2024-05-01 RX ADMIN — HYDROMORPHONE HYDROCHLORIDE 0.5 MG: 1 INJECTION, SOLUTION INTRAMUSCULAR; INTRAVENOUS; SUBCUTANEOUS at 12:35

## 2024-05-01 RX ADMIN — SODIUM CHLORIDE, POTASSIUM CHLORIDE, SODIUM LACTATE AND CALCIUM CHLORIDE 1000 ML: 600; 310; 30; 20 INJECTION, SOLUTION INTRAVENOUS at 21:13

## 2024-05-01 RX ADMIN — ATORVASTATIN CALCIUM 40 MG: 40 TABLET, FILM COATED ORAL at 21:15

## 2024-05-01 RX ADMIN — ROCURONIUM BROMIDE 50 MG: 10 INJECTION, SOLUTION INTRAVENOUS at 11:06

## 2024-05-01 RX ADMIN — DEXAMETHASONE SODIUM PHOSPHATE 8 MG: 4 INJECTION, SOLUTION INTRAMUSCULAR; INTRAVENOUS at 11:37

## 2024-05-01 RX ADMIN — Medication 100 MCG: at 12:27

## 2024-05-01 RX ADMIN — Medication 200 MCG: at 11:45

## 2024-05-01 RX ADMIN — LIDOCAINE HYDROCHLORIDE 80 MG: 20 INJECTION, SOLUTION EPIDURAL; INFILTRATION; INTRACAUDAL; PERINEURAL at 11:06

## 2024-05-01 RX ADMIN — HYDROMORPHONE HYDROCHLORIDE 0.5 MG: 1 INJECTION, SOLUTION INTRAMUSCULAR; INTRAVENOUS; SUBCUTANEOUS at 12:30

## 2024-05-01 RX ADMIN — INSULIN LISPRO 2 UNITS: 100 INJECTION, SOLUTION INTRAVENOUS; SUBCUTANEOUS at 17:15

## 2024-05-01 RX ADMIN — PROPOFOL 150 MCG/KG/MIN: 10 INJECTION, EMULSION INTRAVENOUS at 11:11

## 2024-05-01 RX ADMIN — Medication 200 MCG: at 11:32

## 2024-05-01 RX ADMIN — OXYCODONE 10 MG: 5 TABLET ORAL at 13:35

## 2024-05-01 RX ADMIN — INSULIN LISPRO 3 UNITS: 100 INJECTION, SOLUTION INTRAVENOUS; SUBCUTANEOUS at 21:15

## 2024-05-01 RX ADMIN — SODIUM CHLORIDE 2000 MG: 900 INJECTION INTRAVENOUS at 21:14

## 2024-05-01 RX ADMIN — Medication 100 MCG: at 11:30

## 2024-05-01 RX ADMIN — TRANEXAMIC ACID 1000 MG: 10 INJECTION, SOLUTION INTRAVENOUS at 11:23

## 2024-05-01 RX ADMIN — MORPHINE SULFATE 2 MG: 2 INJECTION, SOLUTION INTRAMUSCULAR; INTRAVENOUS at 03:26

## 2024-05-01 RX ADMIN — Medication 200 MCG: at 12:08

## 2024-05-01 RX ADMIN — FENTANYL CITRATE 50 MCG: 50 INJECTION, SOLUTION INTRAMUSCULAR; INTRAVENOUS at 12:16

## 2024-05-01 RX ADMIN — SODIUM CHLORIDE, POTASSIUM CHLORIDE, SODIUM LACTATE AND CALCIUM CHLORIDE 1000 ML: 600; 310; 30; 20 INJECTION, SOLUTION INTRAVENOUS at 10:46

## 2024-05-01 RX ADMIN — EPHEDRINE SULFATE 5 MG: 5 INJECTION INTRAVENOUS at 12:30

## 2024-05-01 RX ADMIN — Medication 10 ML: at 21:15

## 2024-05-01 RX ADMIN — FENTANYL CITRATE 50 MCG: 50 INJECTION, SOLUTION INTRAMUSCULAR; INTRAVENOUS at 13:02

## 2024-05-01 RX ADMIN — EPHEDRINE SULFATE 5 MG: 5 INJECTION INTRAVENOUS at 11:46

## 2024-05-01 RX ADMIN — ONDANSETRON 4 MG: 2 INJECTION INTRAMUSCULAR; INTRAVENOUS at 12:02

## 2024-05-01 RX ADMIN — FENTANYL CITRATE 50 MCG: 50 INJECTION, SOLUTION INTRAMUSCULAR; INTRAVENOUS at 11:06

## 2024-05-01 RX ADMIN — TRANEXAMIC ACID 1000 MG: 10 INJECTION, SOLUTION INTRAVENOUS at 12:01

## 2024-05-01 RX ADMIN — PROPOFOL 180 MG: 10 INJECTION, EMULSION INTRAVENOUS at 11:06

## 2024-05-01 RX ADMIN — INSULIN GLARGINE 20 UNITS: 100 INJECTION, SOLUTION SUBCUTANEOUS at 21:15

## 2024-05-01 RX ADMIN — Medication 10 ML: at 08:02

## 2024-05-01 RX ADMIN — Medication 200 MCG: at 11:59

## 2024-05-01 RX ADMIN — CEFAZOLIN 2 G: 2 INJECTION, POWDER, FOR SOLUTION INTRAMUSCULAR; INTRAVENOUS at 11:11

## 2024-05-01 NOTE — PAYOR COMM NOTE
"CLINICALS FOR PENDING INPATIENT PRECERT:       AUTH # [id number]   Ren Mccall (49 y.o. Female) 1975          AUTHORIZATION PENDING:   PLEASE CALL OR FAX DETERMINATION TO CONTACT BELOW. THANK YOU.        Brisa Jiang RN MSN  /UR  [facility]  [phone] office  [phone] fax  [email or web address]    [facility]  NPI: [id number]  Tax: [phone]            Ren Mccall (49 y.o. Female)       Date of Birth   1975    Social Security Number       Address   [address] [place] [address] [place] [facility]    Home Phone   [phone]    MRN   [id number]       Restorationist   None    Marital Status                               Admission Date   4/30/24    Admission Type   Emergency    Admitting Provider   Lamotne Cummings MD    Attending Provider   Kami Butt MD    Department, Room/Bed   [facility] 2A PEDIATRICS, 205/1       Discharge Date       Discharge Disposition       Discharge Destination                                 Attending Provider: Kami Butt MD    Allergies: No Known Allergies    Isolation: None   Infection: None   Code Status: Prior    Ht: 175.3 cm (69.02\")   Wt: 84.8 kg (186 lb 15.2 oz)    Admission Cmt: None   Principal Problem: Closed fracture of neck of left femur [S72.002A]                   Active Insurance as of 4/30/2024       Primary Coverage       Payor Plan Insurance Group Employer/Plan Group    [facility] [place] [employer] [id number]        Payor Plan Address Payor Plan Phone Number Payor Plan Fax Number Effective Dates    [address]   4/1/2024 - None Entered    [facility] [address]         Subscriber Name Subscriber Birth Date Member ID       REN MCCALL 1975 [id number]                     Emergency Contacts        (Rel.) Home Phone Work Phone Mobile Phone    Padmini Souza -- -- [phone]                 History & Physical        Zoila, " Lamonte DUNN MD at 24 1734              [facility] Services  History & Physical    Patient Name: Constanza Mccall  : 1975  MRN: [id number]  Primary Care Physician:  Shweta Osorio APRN  Date of admission: 2024  Date and Time of Service: 2024 at 5:30PM    Subjective      Chief Complaint: Hip pain    History of Present Illness: Constanza Mccall is a 49 y.o. white female with multiple medical problems, past history significant for CAD, diabetes, hypertension, hyperlipidemia, peripheral arterial disease.  who presented to [facility] on 2024 with complaints of left hip pain after mechanical fall today.  Patient states that she was trying to walk through grass with her walker, she has a left BKA, she states that the wheel fell off of her walker causing her to fall directly onto her left hip.  She did not hit her head.  She is complaining of pain only to the left hip.  No back pain or knee pain.  She did not fall on her stump.  She does take blood thinners but denies hitting her head or LOC.  She rates her pain a 10/10.     PCP: Shweta Osorio     History provided by:  Patient       Review of Systems   Constitutional:  Negative for chills and fever.   HENT:  Negative for ear pain and hearing loss.    Respiratory:  Negative for apnea and shortness of breath.    Cardiovascular:  Negative for chest pain and palpitations.   Gastrointestinal:  Negative for diarrhea, nausea and vomiting.   Genitourinary:  Negative for dysuria.   Musculoskeletal:  Positive for arthralgias. Negative for back pain and joint swelling.   Neurological:  Negative for seizures, light-headedness and headaches.   Psychiatric/Behavioral:  Negative for confusion and hallucinations.        Personal History     Past Medical History:   Diagnosis Date    Coronary artery disease     Diabetes mellitus     History of echocardiogram 2018    EF 60% Normal LV diastolic filling parameters. There is normal LV wall  thickness. Normla trileaflet aortic valve. Normal mitral valve. Normal LV/RV fucntion with no significant valvulopathy. Normla right anf left pressures estimated.     Hyperlipidemia 12/22/2023    Hypertension 12/22/2023    Neuropathy     Numbness and tingling sensation of skin 06/14/2017    Peripheral artery disease     Rash of hands 06/13/2017    Type 2 diabetes mellitus        Past Surgical History:   Procedure Laterality Date    AMPUTATION DIGIT Left 6/3/2022    Procedure: INCISION AND DRAINAGE WITH AMPUTATION OF LEFT 3RD TOE;  Surgeon: Deonte Heath Jr., DPM;  Location: [facility] MAIN OR;  Service: Podiatry;  Laterality: Left;    CARDIAC CATHETERIZATION N/A 10/15/2019    Procedure: Left Heart Cath;  Surgeon: Merrick Sarmiento MD;  Location: [facility] CATH INVASIVE LOCATION;  Service: Cardiology    CHOLECYSTECTOMY      OOPHORECTOMY         Family History: family history includes Diabetes in her sister; Heart disease in her sister; Hypertension in her father; No Known Problems in her mother. Otherwise pertinent FHx was reviewed and not pertinent to current issue.    Social History:  reports that she has been smoking cigarettes. She has a 50 pack-year smoking history. She has never used smokeless tobacco. She reports that she does not drink alcohol and does not use drugs.    Home Medications:  Prior to Admission Medications       Prescriptions Last Dose Informant Patient Reported? Taking?    clopidogrel (PLAVIX) 75 MG tablet 4/30/2024  No Yes    Take 1 tablet by mouth Daily.    empagliflozin (Jardiance) 10 MG tablet tablet   No Yes    Take 1 tablet by mouth Daily.    metFORMIN (GLUCOPHAGE) 1000 MG tablet 4/30/2024  No Yes    Take 1 tablet by mouth 2 (Two) Times a Day With Meals for 360 days.    Xarelto 2.5 MG tablet 4/30/2024  No Yes    TAKE 1 TABLET BY MOUTH TWICE A DAY WITH MEALS    albuterol sulfate  (90 Base) MCG/ACT inhaler  Self Yes No    Inhale 2 puffs Every 4 (Four) Hours As Needed.     atorvastatin (LIPITOR) 40 MG tablet   No No    Take 1 tablet by mouth Every Night.    citalopram (CeleXA) 20 MG tablet   No No    Take 1 tablet by mouth Daily.    glucose blood test strip   No No    1 each by Other route 4 (Four) Times a Day Before Meals & at Bedtime.    glucose monitor monitoring kit   No No    Use 1 each 4 (Four) Times a Day Before Meals & at Bedtime.    Insulin Glargine (LANTUS SOLOSTAR) 100 UNIT/ML injection pen   No No    Inject 20 Units under the skin into the appropriate area as directed Every Night. Has been out is ordered    Insulin Pen Needle (Pen Needles) 32G X 4 MM misc   No No    Use 1 each Every Night.    Lancets misc   No No    Use 1 each 4 (Four) Times a Day Before Meals & at Bedtime.    lisinopril (PRINIVIL,ZESTRIL) 10 MG tablet   No No    Take 0.5 tablets by mouth Daily.              Allergies:  No Known Allergies    Objective      Vitals:   Temp:  [97.7 °F (36.5 °C)] 97.7 °F (36.5 °C)  Heart Rate:  [66-68] 66  Resp:  [16-20] 16  BP: (115-140)/(65-70) 115/65  Body mass index is 27.62 kg/m².  Physical Exam  Vitals and nursing note reviewed.   Constitutional:       General: She is not in acute distress.     Appearance: She is well-developed. She is not ill-appearing, toxic-appearing or diaphoretic.   HENT:      Head: Normocephalic and atraumatic.      Nose: Nose normal. No congestion or rhinorrhea.      Mouth/Throat:      Mouth: Mucous membranes are moist.      Pharynx: No oropharyngeal exudate.   Eyes:      General: No scleral icterus.        Right eye: No discharge.         Left eye: No discharge.      Extraocular Movements: Extraocular movements intact.      Conjunctiva/sclera: Conjunctivae normal.      Pupils: Pupils are equal, round, and reactive to light.   Neck:      Thyroid: No thyromegaly.      Vascular: No carotid bruit or JVD.      Trachea: No tracheal deviation.   Cardiovascular:      Rate and Rhythm: Normal rate and regular rhythm.      Pulses: Normal pulses.      Heart  sounds: Normal heart sounds. No murmur heard.     No friction rub. No gallop.   Pulmonary:      Effort: Pulmonary effort is normal. No respiratory distress.      Breath sounds: Normal breath sounds. No stridor. No wheezing, rhonchi or rales.   Chest:      Chest wall: No tenderness.   Abdominal:      General: Bowel sounds are normal. There is no distension.      Palpations: Abdomen is soft. There is no mass.      Tenderness: There is no abdominal tenderness. There is no guarding or rebound.      Hernia: No hernia is present.   Musculoskeletal:         General: No swelling, tenderness, deformity or signs of injury. Normal range of motion.      Cervical back: Normal range of motion and neck supple. No rigidity. No muscular tenderness.      Right lower leg: No edema.      Left lower leg: No edema.      Comments: Left hip pain, left below-knee amputation   Lymphadenopathy:      Cervical: No cervical adenopathy.   Skin:     General: Skin is warm and dry.      Coloration: Skin is not jaundiced or pale.      Findings: No bruising, erythema or rash.   Neurological:      General: No focal deficit present.      Mental Status: She is alert and oriented to person, place, and time. Mental status is at baseline.      Cranial Nerves: No cranial nerve deficit.      Sensory: No sensory deficit.      Motor: No weakness or abnormal muscle tone.      Coordination: Coordination normal.   Psychiatric:         Mood and Affect: Mood normal.         Behavior: Behavior normal.         Thought Content: Thought content normal.         Judgment: Judgment normal.         Diagnostic Data:  Lab Results (last 24 hours)       Procedure Component Value Units Date/Time    Protime-INR [[id number]] Collected: 04/30/24 1733    Specimen: Blood Updated: 04/30/24 1737    aPTT [[id number]] Collected: 04/30/24 1733    Specimen: Blood Updated: 04/30/24 1737    Basic Metabolic Panel [[id number]]  (Normal) Collected: 04/30/24 1653    Specimen: Blood Updated:  04/30/24 1721     Glucose 97 mg/dL      BUN 12 mg/dL      Creatinine 0.71 mg/dL      Sodium 139 mmol/L      Potassium 3.9 mmol/L      Chloride 103 mmol/L      CO2 24.0 mmol/L      Calcium 10.2 mg/dL      BUN/Creatinine Ratio 16.9     Anion Gap 12.0 mmol/L      eGFR 104.4 mL/min/1.73     Narrative:      GFR Normal >60  Chronic Kidney Disease <60  Kidney Failure <15      CBC & Differential [[id number]]  (Abnormal) Collected: 04/30/24 1653    Specimen: Blood Updated: 04/30/24 1702    Narrative:      The following orders were created for panel order CBC & Differential.  Procedure                               Abnormality         Status                     ---------                               -----------         ------                     CBC Auto Differential[[id number]]        Abnormal            Final result                 Please view results for these tests on the individual orders.    CBC Auto Differential [[id number]]  (Abnormal) Collected: 04/30/24 1653    Specimen: Blood Updated: 04/30/24 1702     WBC 11.29 10*3/mm3      RBC 4.78 10*6/mm3      Hemoglobin 14.2 g/dL      Hematocrit 43.3 %      MCV 90.6 fL      MCH 29.7 pg      MCHC 32.8 g/dL      RDW 13.2 %      RDW-SD 44.3 fl      MPV 10.7 fL      Platelets 203 10*3/mm3      Neutrophil % 67.3 %      Lymphocyte % 23.6 %      Monocyte % 5.7 %      Eosinophil % 2.0 %      Basophil % 0.3 %      Immature Grans % 1.1 %      Neutrophils, Absolute 7.61 10*3/mm3      Lymphocytes, Absolute 2.66 10*3/mm3      Monocytes, Absolute 0.64 10*3/mm3      Eosinophils, Absolute 0.23 10*3/mm3      Basophils, Absolute 0.03 10*3/mm3      Immature Grans, Absolute 0.12 10*3/mm3      nRBC 0.0 /100 WBC              Imaging Results (Last 24 Hours)       Procedure Component Value Units Date/Time    XR Chest 1 View [[id number]] Collected: 04/30/24 1607     Updated: 04/30/24 1610    Narrative:      XR CHEST 1 VW    Date of Exam: 4/30/2024 4:05 PM EDT    Indication: pre op hip  frac    Comparison: AP portable chest 3/12/2021    Findings:  No acute airspace disease. Normal heart size. No pleural effusion, pneumothorax or acute osseous abnormality. Benign calcified nodule in the perihilar left lung.      Impression:      Impression:  No acute chest finding.      Electronically Signed: Christina Wills MD    4/30/2024 4:08 PM EDT    Workstation ID: [id number]    XR Hip With or Without Pelvis 2 - 3 View Left [[id number]] Collected: 04/30/24 1545     Updated: 04/30/24 1549    Narrative:      XR HIP W OR WO PELVIS 2-3 VIEW LEFT    Date of Exam: 4/30/2024 3:25 PM EDT    Indication: fall    Comparison: None available.    Findings:  There is an incomplete, nondisplaced fracture of the left femoral neck, with fracture line seen at its medial cortical margin. There is no hip dislocation.    There is moderate degenerative change of the left hip with slightly out of round configuration of the femoral head and femoral head marginal osteophyte formation. The acetabulum appears within normal limits. Normal appearance of the right hip. Sacroiliac   joints are normal. Imaged lower lumbar spine is normal.        Impression:      1. Incomplete nondisplaced fracture of the left femoral neck. No dislocation    2. Moderate degenerative change of the left hip.    Electronically Signed: Christina Wills MD    4/30/2024 3:47 PM EDT    Workstation ID: [id number]              Assessment & Plan        This is a 49 y.o. female with:    Active and Resolved Problems  Active Hospital Problems    Diagnosis  POA    **Closed fracture of neck of left femur [S72.002A]  Unknown    Hypertension [I10]  Yes    Hyperlipidemia [E78.5]  Yes    CAD (coronary artery disease) [I25.10]  Yes    Peripheral vascular disease [I73.9]  Yes    Type 2 diabetes mellitus with complication, without long-term current use of insulin [E11.8]  Yes    Neuropathy [G62.9]  Yes      Resolved Hospital Problems   No resolved problems to display.     Left femoral neck  fracture  Plan  Ad[facility] to [facility]  Orthopedic consult  Pain management  PT OT  consult  DVT prophylaxis  N.p.o. after midnight  Hold Xarelto, Plavix    Hypertension chronic essential continue lisinopril monitor blood pressure closely  Hyperlipidemia continue statins check CK and lipid panel  Type 2 diabetes hold p.o. meds regular insulin sliding scale continue Lantus  Leukocytosis, likely reactive, check procalcitonin check UA CNS monitor CBC  Depression chronic continue Celexa    DVT prophylaxis:  No DVT prophylaxis order currently exists.        The patient desires to be as follows:    CODE STATUS:           Padmini Souza [phone] (Mobile) , who can be contacted, is the designated person to make medical decisions on the patient's behalf if She is incapable of doing so. This was clarified with patient and/or next of kin on 4/30/2024 during the course of this H&P.    Admission Status:  I believe this patient meets ADMIT status.    Expected Length of Stay: 4DAYS    PDMP and Medication Dispenses via Sidebar reviewed and consistent with patient reported medications.    I discussed the patient's findings and my recommendations with patient.      Signature:     This document has been electronically signed by Lamonte Cummings MD on April 30, 2024 17:38 EDT   [facility]    Electronically signed by Lamonte Cummings MD at 04/30/24 1743          Emergency Department Notes        Nila Fitzgerald at 04/30/24 1601          EKG requested       Electronically signed by Nila Fitzgerald at 04/30/24 1601       Marilyn Newman PA at 04/30/24 1508       Attestation signed by Tahir Thomas MD at 04/30/24 2106        SHARED APC NON FACE TO FACE: I performed a substantive part of the MDM during the patient's E/M visit. I personally made or approved the documented management plan and acknowledge its risk of complications.   Tahir Thomas MD 4/30/2024 21:06 EDT                          Subjective   History of Present Illness  Chief Complaint: Hip pain    Patient is a 49-year-old female history of CAD diabetes hypertension presents to the ER with complaints of left hip pain after mechanical fall today.  Patient states that she was trying to walk through grass with her walker, she has a left BKA, she states that the wheel fell off of her walker causing her to fall directly onto her left hip.  She did not hit her head.  She is complaining of pain only to the left hip.  No back pain or knee pain.  She did not fall on her stump.  She does take blood thinners but denies hitting her head or LOC.  She rates her pain a 10/10.    PCP: Shweta Osorio    History provided by:  Patient      Review of Systems   Constitutional:  Negative for fever.   HENT:  Negative for sore throat and trouble swallowing.    Eyes: Negative.    Respiratory:  Negative for shortness of breath and wheezing.    Cardiovascular:  Negative for chest pain.   Gastrointestinal:  Negative for abdominal pain, diarrhea, nausea and vomiting.   Endocrine: Negative.    Genitourinary:  Negative for dysuria.   Musculoskeletal:  Positive for arthralgias. Negative for myalgias.        Left hip pain   Skin:  Negative for rash.   Allergic/Immunologic: Negative.    Neurological:  Negative for headaches.   Psychiatric/Behavioral:  Negative for behavioral problems.    All other systems reviewed and are negative.      Past Medical History:   Diagnosis Date    Coronary artery disease     Diabetes mellitus     History of echocardiogram 12/17/2018    EF 60% Normal LV diastolic filling parameters. There is normal LV wall thickness. Normla trileaflet aortic valve. Normal mitral valve. Normal LV/RV fucntion with no significant valvulopathy. Normla right anf left pressures estimated.     Hyperlipidemia 12/22/2023    Hypertension 12/22/2023    Neuropathy     Numbness and tingling sensation of skin 06/14/2017    Peripheral artery disease     Rash of hands 06/13/2017     Type 2 diabetes mellitus        No Known Allergies    Past Surgical History:   Procedure Laterality Date    AMPUTATION DIGIT Left 6/3/2022    Procedure: INCISION AND DRAINAGE WITH AMPUTATION OF LEFT 3RD TOE;  Surgeon: Deonte Heath Jr., DPM;  Location: [facility] MAIN OR;  Service: Podiatry;  Laterality: Left;    CARDIAC CATHETERIZATION N/A 10/15/2019    Procedure: Left Heart Cath;  Surgeon: Merrick Sarmiento MD;  Location: [facility] CATH INVASIVE LOCATION;  Service: Cardiology    CHOLECYSTECTOMY      OOPHORECTOMY         Family History   Problem Relation Age of Onset    No Known Problems Mother     Hypertension Father     Diabetes Sister     Heart disease Sister        Social History     Socioeconomic History    Marital status:      Spouse name: Sergey    Number of children: 2    Years of education: 12   Tobacco Use    Smoking status: Every Day     Current packs/day: 2.50     Average packs/day: 2.5 packs/day for 20.0 years (50.0 ttl pk-yrs)     Types: Cigarettes    Smokeless tobacco: Never    Tobacco comments:     cut down quit none dos   Vaping Use    Vaping status: Never Used   Substance and Sexual Activity    Alcohol use: No    Drug use: No    Sexual activity: Defer           Objective   Physical Exam  Vitals and nursing note reviewed.   Constitutional:       Appearance: Normal appearance. She is well-developed and normal weight. She is not ill-appearing or toxic-appearing.   HENT:      Head: Normocephalic and atraumatic.   Eyes:      Pupils: Pupils are equal, round, and reactive to light.   Cardiovascular:      Rate and Rhythm: Normal rate and regular rhythm.      Pulses: Normal pulses.      Heart sounds: Normal heart sounds.   Pulmonary:      Effort: Pulmonary effort is normal. No respiratory distress.      Breath sounds: Normal breath sounds. No wheezing.   Musculoskeletal:         General: Tenderness present. No deformity or signs of injury.      Comments: Lumbar spine: No step-offs or deformities,  "no midline tenderness to palpation.  Tenderness extending into the left hip and buttocks.  Bilateral lower extremities: Positive left straight leg raise.  5 out of 5 strength.  Sensation intact to light touch.  2+ reflexes.  No clonus.  Negative Babinski sign.    Patient has left BKA  No tenderness to left knee or the left stump   Skin:     General: Skin is warm and dry.      Capillary Refill: Capillary refill takes less than 2 seconds.      Findings: No bruising or rash.   Neurological:      General: No focal deficit present.      Mental Status: She is alert and oriented to person, place, and time.   Psychiatric:         Mood and Affect: Mood normal.         Behavior: Behavior normal.         Procedures          ED Course  ED Course as of 04/30/24 1624   Tue Apr 30, 2024   1558 Spoke with Dr. Solis regarding abnormal x-ray finding and left hip fracture.  Will admit to the hospitalist []      ED Course User Index  [] Marilyn Newman PA    /65 (BP Location: Right arm, Patient Position: Lying)   Pulse 66   Temp 97.7 °F (36.5 °C)   Resp 16   Ht 175.3 cm (69\")   Wt 84.8 kg (187 lb)   LMP  (LMP Unknown)   SpO2 95%   BMI 27.62 kg/m²   Labs Reviewed   BASIC METABOLIC PANEL   PROTIME-INR   APTT   CBC WITH AUTO DIFFERENTIAL   CBC AND DIFFERENTIAL    Narrative:     The following orders were created for panel order CBC & Differential.  Procedure                               Abnormality         Status                     ---------                               -----------         ------                     CBC Auto Differential[[id number]]                                                         Please view results for these tests on the individual orders.     Medications   sodium chloride 0.9 % flush 10 mL (has no administration in time range)   HYDROcodone-acetaminophen (NORCO) 7.5-325 MG per tablet 1 tablet (1 tablet Oral Given 4/30/24 1515)     XR Chest 1 View    Result Date: 4/30/2024  Impression: No " acute chest finding. Electronically Signed: Christina Wills MD  4/30/2024 4:08 PM EDT  Workstation ID: [id number]    XR Hip With or Without Pelvis 2 - 3 View Left    Result Date: 4/30/2024  1. Incomplete nondisplaced fracture of the left femoral neck. No dislocation 2. Moderate degenerative change of the left hip. Electronically Signed: Christina Wills MD  4/30/2024 3:47 PM EDT  Workstation ID: [id number]                                            Medical Decision Making  Differential Dx (Includes but not limited to): Fracture contusion dislocation  Medical Records Reviewed: Patient has BKA left leg due to nonhealing ulcer of the toe on the left foot.  Initial surgery done by Dr. Heath  Labs: Pending  Imaging: Imaging was reviewed by myself, independently interpreted by Dr. Tahir Thomas left femoral neck fracture  Telemetry: Ordered for preop, pending  Testing considered but not ordered: CT head patient denies head injury or LOC  Nature of Complaint: Acute  Admission vs Discharge: Admission      Discussion: While in the ED IV was placed and labs were obtained appropriate PPE was worn during exam and throughout all encounters with the patient.  Patient afebrile nontoxic appearance in no acute respiratory distress.  She has no evidence of head or facial trauma.  Only complaining of hip pain.  She was initially given a pain pill, x-ray imaging did show incomplete nondisplaced fracture of the left femoral neck.  She has BKA of this leg already.  IV established and preop blood work chest x-ray and EKG were obtained.  I spoke with Dr. Solis regarding abnormal x-ray and plan for hospitalist admission.  Spoke with Dr. Cummings regarding admission    Based on the clinical findings at this time I anticipate that the patient will require 2 midnight stay.    Case discussed with ER attending, Dr. Tahir Thomas      Problems Addressed:  Closed fracture of neck of left femur, initial encounter: complicated acute illness or injury  Fall,  initial encounter: complicated acute illness or injury  Left hip pain: complicated acute illness or injury    Amount and/or Complexity of Data Reviewed  Labs: ordered. Decision-making details documented in ED Course.  Radiology: ordered. Decision-making details documented in ED Course.  ECG/medicine tests: ordered.    Risk  Prescription drug management.  Decision regarding hospitalization.        Final diagnoses:   Closed fracture of neck of left femur, initial encounter   Left hip pain   Fall, initial encounter       ED Disposition  ED Disposition       ED Disposition   Decision to Admit    Condition   --    Comment   Level of Care: Med/Surg [1]   Admitting Physician: ELLA NORIEGA [[id number]]   Attending Physician: ELLA NORIEGA [[id number]]                 No follow-up provider specified.       Medication List      No changes were made to your prescriptions during this visit.            Marilyn Newman PA  04/30/24 162      Electronically signed by Tahir Thomas MD at 04/30/24 [id number]

## 2024-05-01 NOTE — OP NOTE
Anterior Total Hip Operative Note  Dr. LIAM Solis II  [phone]    PATIENT NAME: Constanza Mccall  MRN: [id number]  : 1975 AGE: 49 y.o. GENDER: female  DATE OF OPERATION: 2024  PREOPERATIVE DIAGNOSIS: Femoral Neck Fracture  POSTOPERATIVE DIAGNOSIS: Same  OPERATION PERFORMED: Left Anterior Total Hip Arthroplasty  SURGEON: Michi Solis MD  Circulator: Meredith Wallis RN  Radiology Technologist: Cecile Knox  Scrub Person: Andressa Soto  Vendor Representative: Michelle Smallwood  Orderly: Anthony Mckeon  Assistant: Carson Thorpe CSFA  ANESTHESIA: General  ASSISTANT: Carson Thorpe. This case would not have been possible without another set of skilled surgical hands for retraction, use of instrumentation, and general assistance.  This assistance was vital to the success of the case.   ESTIMATED BLOOD LOSS: 400cc  SPONGE AND NEEDLE COUNT: Correct  INDICATIONS:  Fracture: This patient was noted to have a femoral neck fracture.  Surgical options were discussed with the patient and they elected to undergo a total hip replacement. A discussion of operative versus nonoperative treatment was had. They elected to undergo anterior total hip arthroplasty. The risks of surgery were discussed and included the risk of anesthesia, infection, damage to neurovascular structures, implant loosening/failure, fracture, hardware prominence, dislocation, the need for further procedures, medical complications, and others. No guarantees were made. The patient wished to proceed with surgery and a surgical consent was signed.  COMPONENTS:   Acetabular Cup: Smith [employer] Nephew R3 acetabular cup: 54 Outer Diameter  Cup Screws: Smith & Nephew 6.5 mm screws: No Screws Were Used  Smith & Nephew Neutral Acetabular Liner: Neutral  Smith & Nephew Polar Femoral Stem: Size 7  Smith & Nephew Oxinium Head: Dual Mobilitymm +8    PERTINENT FINDINGS: Fracture: Fracture of the femoral neck    DETAILS OF PROCEDURE:   The  patient was met in the preoperative area. The site was marked. The consent and H&P were reviewed. The patient was then wheeled back to the operative suite underwent anesthesia. The Ruby table boots were secured to the patients’ right foot.  A surgical timeout was then performed and before moving the patient on the Ruby table, a traction pin was placed in the distal femur of the left leg ensuring to avoid the neurovascular bundle.  This was done under sterile conditions.  The patient was moved onto the Ruby table and secured in the supine position. The perineal post was inserted and the boots were secured into the leg holders. Surgical alcohol was used to thoroughly clean the operative area.     The hip and leg was then prepped in the normal sterile fashion, multiple layers of sterile drapes, and surgical space suits for the entire operative team. New outer gloves were used by all sterile surgical team members after final draping. The surgical incision was marked. A surgical timeout was performed.    A Modified Colon-Rivas anterior approach was used. Dissection was carried down to the fascia. The fascia was incised and the tensor fascia crystal muscle was retracted laterally and the sartorius medially. The lateral femoral circumflex vessels were identified and cauterized using bovie. The rectus femoris was retracted medially. A capsulotomy was then performed. The capsule was tagged with Ethibond for later repair. Retractors were placed on either side of the femoral neck and dissection was further carried down so that the lesser trochanter could be palpated and the superior rim of the acetabulum could be visualized.    The femoral neck cut was then made from  just proximal to the lesser trochanter medially headed towards the saddle laterally. Care was taken not to extend the cut into the lesser or greater trochanters. The head and neck segment were removed with a corkscrew. The acetabulum was then exposed. The labrum  was removed using a kocher and scalpel and excess osteophytes were removed using an osteotome.    The acetabulum was progressively reamed, beginning with medialization and then finalizing the position of the reamer to approximate the final cup position. Fluoroscopy was used to ensure proper placement of the reamer, including adequate medialization as well as appropriate abduction and anteversion. The real cup was then opened and inserted using fluoroscopy, ensuring good position in terms of abduction and anteversion.     No Screws: Initial press-fit fixation of the cup was very robust and no screws were required for supplemental fixation.    After thorough irrigation and ensuring that no soft tissue was entrapped within the cup, the real liner was snapped into place.    The hook was placed just distal to the greater trochanter. The femur was then externally rotated, extended and adducted under the well leg. Soft tissue releases were performed to gain exposure to the proximal femur. Capsule was released from the saddle and the lateral femur. Care was taken to preserve the short external rotator tendons. The capsule was also released along the medial femur. The hydraulic femoral lift was then used to better expose the femur. Further soft tissue releases were then performed, again ensuring preservation of the short external rotators.    The femur was machined with a cookie cutter osteotome and then a rasp was used to further lateralize the starting point. The sclerotic bone on the lateral shoulder of the femur was removed with a rongeour and curette as needed to protect the greater trochanter. Progressive broaches were inserted until adequate fill had been achieved. Using fluoroscopy, the femoral stem was visualized after trial reduction of the hip. The length and offset were compared to the non-operative hip. Trials of stem size and neck length were trialed until equal leg length and offset were obtained. Additionally  "hip stability was tested with internal and external rotation of the leg. The leg was stable with at least 90° of external rotation and there was no impingement at 60° of internal rotation. After implantation of the final stem and ball, the leg was once again brought into normal anatomic position and relocated. Final x-rays were taken with final implants noting good position of the stem and cup, and no visualized fracture.. The hip was stable upon reduction.    An analgesic cocktail was then injected about the hip as well as the surgical dissection area. The capsule was closed.  The fascia was then closed with a running stitch and the skin was closed in layers.  A sterile dressing was applied.    The traction pin was removed and sterile dressings were applied on the traction pin sites.    The patient was moved from the Only table to the [facility] where the boots were removed. The patient was taken to the recovery room in stable condition. There were no complications and the patient tolerated the procedure well.    R \"Paul\" Irma MASON MD  Orthopaedic Surgery  [facility]  [phone]            "

## 2024-05-01 NOTE — PLAN OF CARE
Goal Outcome Evaluation:  Plan of Care Reviewed With: patient        Progress: improving   Pt is a 48 y/o F admitted to [facility] on 4/30/24 with complaints of L hip pain following a fall earlier in the day when wheel fell off walker, causing her to fall on her hip. XR L Hip (+) for incomplete nondisplaced fracture of the left femoral neck and she is now s/p L aTHA with Dr. Solis on 5/1/24. 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 Per orders in the chart, pt is to be WBAT on the LLE. She is currently living with aunt and several other family members in [facility] with no steps to enter. At baseline, she is normally IND with household mobility and ADLs with no AD. Owns LLE prosthesis that she dons IND and wears throughout the day. No AD needed for household mobility, but uses RW for community ambulation. No home O2 and does not drive. This date, she is AAOx4 and lying supine in bed. She completes bed mobility IND, transfers CGA, and amb 20' CGA with RW this date. Able to don prosthesis IND, ambulating with antalgic gait and decreased WB on the LLE. Pt has had several falls at home, similar to fall she experienced on her admission. She is concerned about returning home in this condition and feels safer staying at SNF for short-term to increase activity levels. She completes activities well this date, but would still benefit from SNF for continued rehab and prosthetic training to prevent falls in the household. PT will continue to follow.    Anticipated Discharge Disposition (PT): skilled nursing facility

## 2024-05-01 NOTE — CONSULTS
Orthopaedic Surgery  Hip Fracture Consult Note  Dr. LIAM Solis II  [phone]    HPI:  Patient is a 49 y.o. Not  or  female who presents with hip pain after a fall from standing.  They presented to the ER for further workup where a left Femoral Neck Fracture was found. I was consulted for further management.  This patient does have a history of a left below-knee amputation that was performed for vascular and infection reasons.  She complains of sharp pains in the left hip worse with any attempted movement.  She does use a prosthetic for ambulation.    MEDICAL HISTORY  Past Medical History:   Diagnosis Date    Coronary artery disease     Diabetes mellitus     History of echocardiogram 12/17/2018    EF 60% Normal LV diastolic filling parameters. There is normal LV wall thickness. Normla trileaflet aortic valve. Normal mitral valve. Normal LV/RV fucntion with no significant valvulopathy. Normla right anf left pressures estimated.     Hyperlipidemia 12/22/2023    Hypertension 12/22/2023    Neuropathy     Numbness and tingling sensation of skin 06/14/2017    Peripheral artery disease     Rash of hands 06/13/2017    Type 2 diabetes mellitus      Past Surgical History:   Procedure Laterality Date    AMPUTATION DIGIT Left 6/3/2022    Procedure: INCISION AND DRAINAGE WITH AMPUTATION OF LEFT 3RD TOE;  Surgeon: Deonte Heath Jr., DPM;  Location: [facility] MAIN OR;  Service: Podiatry;  Laterality: Left;    CARDIAC CATHETERIZATION N/A 10/15/2019    Procedure: Left Heart Cath;  Surgeon: Merrick Sarmiento MD;  Location: [facility] CATH INVASIVE LOCATION;  Service: Cardiology    CHOLECYSTECTOMY      OOPHORECTOMY       Prior to Admission medications    Medication Sig Start Date End Date Taking? Authorizing Provider   clopidogrel (PLAVIX) 75 MG tablet Take 1 tablet by mouth Daily. 4/10/24  Yes Sussy Timmons APRN   empagliflozin (Jardiance) 10 MG tablet tablet Take 1 tablet by mouth Daily. 2/28/24  Yes  Tariq Polanco MD   metFORMIN (GLUCOPHAGE) 1000 MG tablet Take 1 tablet by mouth 2 (Two) Times a Day With Meals for 360 days. 2/28/24 2/22/25 Yes Tariq Polanco MD   Xarelto 2.5 MG tablet TAKE 1 TABLET BY MOUTH TWICE A DAY WITH MEALS 4/11/24  Yes Sussy Timmons APRN   albuterol sulfate  (90 Base) MCG/ACT inhaler Inhale 2 puffs Every 4 (Four) Hours As Needed.    Provider, MD Natividad   atorvastatin (LIPITOR) 40 MG tablet Take 1 tablet by mouth Every Night. 4/10/24   Sussy Timmons APRN   citalopram (CeleXA) 20 MG tablet Take 1 tablet by mouth Daily. 2/1/24   Shweta Osorio APRN   glucose blood test strip 1 each by Other route 4 (Four) Times a Day Before Meals & at Bedtime. 2/28/24   Tariq Polanco MD   glucose monitor monitoring kit Use 1 each 4 (Four) Times a Day Before Meals & at Bedtime. 2/28/24   Tariq Polanco MD   Insulin Glargine (LANTUS SOLOSTAR) 100 UNIT/ML injection pen Inject 20 Units under the skin into the appropriate area as directed Every Night. Has been out is ordered 2/28/24   Tariq Polanco MD   Insulin Pen Needle (Pen Needles) 32G X 4 MM misc Use 1 each Every Night. 2/28/24   Tariq Polanco MD   Lancets misc Use 1 each 4 (Four) Times a Day Before Meals & at Bedtime. 2/28/24   Tariq Polanco MD   lisinopril (PRINIVIL,ZESTRIL) 10 MG tablet Take 0.5 tablets by mouth Daily. 7/15/22   Merrick Sarmiento MD     No Known Allergies  Most Recent Immunizations   Administered Date(s) Administered    Fluzone (or Fluarix & Flulaval for VFC) >6mos 12/22/2023    Pneumococcal Conjugate 20-Valent (PCV20) 12/22/2023    Pneumococcal Polysaccharide (PPSV23) 12/28/2021    Tdap 08/27/2018     Social History     Tobacco Use    Smoking status: Every Day     Current packs/day: 2.50     Average packs/day: 2.5 packs/day for 20.0 years (50.0 ttl pk-yrs)     Types: Cigarettes    Smokeless tobacco: Never    Tobacco comments:     cut down quit none dos   Substance Use Topics    Alcohol use: No      Social History  "    Substance and Sexual Activity   Drug Use No       VITALS: /58 (BP Location: Left arm, Patient Position: Lying)   Pulse 57   Temp 98.5 °F (36.9 °C) (Oral)   Resp 10   Ht 175.3 cm (69.02\")   Wt 84.8 kg (186 lb 15.2 oz)   LMP  (LMP Unknown)   SpO2 98%   BMI 27.59 kg/m²  Body mass index is 27.59 kg/m².    PHYSICAL EXAM:   CONSTITUTIONAL: No acute distress  LUNGS: Equal chest rise, no shortness of air  CARDIOVASCULAR: palpable peripheral pulses  SKIN: no skin lesions in the area examined  LYMPH: no lymphadenopathy in the area examined  Left Lower Extremity  Tenderness to Palpation: Tenderness to palpation at the hip  Pulses:  Palpable DP/PT pulses  Sensation: Intact to her BKA stump  Motor: Able to flex and extend at the knee  Range of Motion: Range of motion of hip deferred secondary to pain.  Positive pain with passive leg roll    RADIOLOGY REVIEW:   XR Chest 1 View    Result Date: 4/30/2024  Impression: No acute chest finding. Electronically Signed: Christina Wills MD  4/30/2024 4:08 PM EDT  Workstation ID: [id number]    XR Hip With or Without Pelvis 2 - 3 View Left    Result Date: 4/30/2024  1. Incomplete nondisplaced fracture of the left femoral neck. No dislocation 2. Moderate degenerative change of the left hip. Electronically Signed: Christina Wills MD  4/30/2024 3:47 PM EDT  Workstation ID: [id number]     LABS:   Recent Results (from the past 24 hour(s))   ECG 12 Lead Pre-Op / Pre-Procedure    Collection Time: 04/30/24  4:37 PM   Result Value Ref Range    QT Interval 460 ms    QTC Interval 441 ms   Basic Metabolic Panel    Collection Time: 04/30/24  4:53 PM    Specimen: Blood   Result Value Ref Range    Glucose 97 65 - 99 mg/dL    BUN 12 6 - 20 mg/dL    Creatinine 0.71 0.57 - 1.00 mg/dL    Sodium 139 136 - 145 mmol/L    Potassium 3.9 3.5 - 5.2 mmol/L    Chloride 103 98 - 107 mmol/L    CO2 24.0 22.0 - 29.0 mmol/L    Calcium 10.2 8.6 - 10.5 mg/dL    BUN/Creatinine Ratio 16.9 7.0 - 25.0    Anion Gap " 12.0 5.0 - 15.0 mmol/L    eGFR 104.4 >60.0 mL/min/1.73   CBC Auto Differential    Collection Time: 04/30/24  4:53 PM    Specimen: Blood   Result Value Ref Range    WBC 11.29 (H) 3.40 - 10.80 10*3/mm3    RBC 4.78 3.77 - 5.28 10*6/mm3    Hemoglobin 14.2 12.0 - 15.9 g/dL    Hematocrit 43.3 34.0 - 46.6 %    MCV 90.6 79.0 - 97.0 fL    MCH 29.7 26.6 - 33.0 pg    MCHC 32.8 31.5 - 35.7 g/dL    RDW 13.2 12.3 - 15.4 %    RDW-SD 44.3 37.0 - 54.0 fl    MPV 10.7 6.0 - 12.0 fL    Platelets 203 140 - 450 10*3/mm3    Neutrophil % 67.3 42.7 - 76.0 %    Lymphocyte % 23.6 19.6 - 45.3 %    Monocyte % 5.7 5.0 - 12.0 %    Eosinophil % 2.0 0.3 - 6.2 %    Basophil % 0.3 0.0 - 1.5 %    Immature Grans % 1.1 (H) 0.0 - 0.5 %    Neutrophils, Absolute 7.61 (H) 1.70 - 7.00 10*3/mm3    Lymphocytes, Absolute 2.66 0.70 - 3.10 10*3/mm3    Monocytes, Absolute 0.64 0.10 - 0.90 10*3/mm3    Eosinophils, Absolute 0.23 0.00 - 0.40 10*3/mm3    Basophils, Absolute 0.03 0.00 - 0.20 10*3/mm3    Immature Grans, Absolute 0.12 (H) 0.00 - 0.05 10*3/mm3    nRBC 0.0 0.0 - 0.2 /100 WBC   Procalcitonin    Collection Time: 04/30/24  4:53 PM    Specimen: Blood   Result Value Ref Range    Procalcitonin 0.02 0.00 - 0.25 ng/mL   BNP    Collection Time: 04/30/24  4:53 PM    Specimen: Blood   Result Value Ref Range    proBNP 54.5 0.0 - 450.0 pg/mL   Protime-INR    Collection Time: 04/30/24  5:33 PM    Specimen: Blood   Result Value Ref Range    Protime 11.1 9.6 - 11.7 Seconds    INR 1.02 0.93 - 1.10   aPTT    Collection Time: 04/30/24  5:33 PM    Specimen: Blood   Result Value Ref Range    PTT 39.0 (L) 61.0 - 76.5 seconds   Type & Screen    Collection Time: 04/30/24  5:33 PM    Specimen: Blood   Result Value Ref Range    ABO Type A     RH type Positive     Antibody Screen Negative     T&S Expiration Date 5/3/2024 11:59:59 PM    POC Glucose Once    Collection Time: 04/30/24  9:26 PM    Specimen: Blood   Result Value Ref Range    Glucose 167 (H) 70 - 105 mg/dL   COVID-19, FLU  "A/B, RSV PCR 1 HR TAT - Swab, Nasopharynx    Collection Time: 04/30/24  9:42 PM    Specimen: Nasopharynx; Swab   Result Value Ref Range    COVID19 Not Detected Not Detected - Ref. Range    Influenza A PCR Not Detected Not Detected    Influenza B PCR Not Detected Not Detected    RSV, PCR Not Detected Not Detected   POC Glucose Once    Collection Time: 05/01/24  7:52 AM    Specimen: Blood   Result Value Ref Range    Glucose 104 70 - 105 mg/dL   Hemoglobin A1c    Collection Time: 05/01/24  8:02 AM    Specimen: Blood   Result Value Ref Range    Hemoglobin A1C 6.20 (H) 4.80 - 5.60 %   Calcium, Ionized    Collection Time: 05/01/24  8:02 AM    Specimen: Blood   Result Value Ref Range    Ionized Calcium 1.18 (L) 1.20 - 1.30 mmol/L   TSH    Collection Time: 05/01/24  8:02 AM    Specimen: Blood   Result Value Ref Range    TSH 1.960 0.270 - 4.200 uIU/mL   Prepare Platelet Pheresis, 1 Units    Collection Time: 05/01/24 11:48 AM   Result Value Ref Range    Product Code M5470H09     Unit Number [id number]     UNIT  ABO A     UNIT  RH POS     Dispense Status IS     Blood Expiration Date [id number]     Blood Type Barcode 6200        IMPRESSION:  Patient is a 49 y.o. Not  or  female with left Femoral Neck Fracture above a well-healed BKA stump    PLAN:   Admited to: Lamonte Cummings MD  Disposition: Plan surgical intervention on the left hip today.  Plan is left anterior total hip.  I explained to the patient this will require a traction pin in place to her distal femur to allow for manipulation of the leg during surgery.  She does understand.  She understands given her medical issues she is at increased risk of complication and does wish to proceed.    R \"Paul\" Irma MASON MD  Orthopaedic Surgery  [facility]  [phone] - [place] Office  [phone] - [place] Office          "

## 2024-05-01 NOTE — ANESTHESIA PREPROCEDURE EVALUATION
Anesthesia Evaluation     Patient summary reviewed and Nursing notes reviewed   NPO Solid Status: > 8 hours  NPO Liquid Status: > 2 hours           Airway   Dental      Pulmonary    (+) a smoker Current, Abstained day of surgery, COPD moderate,  Cardiovascular   Exercise tolerance: poor (<4 METS)    ECG reviewed  PT is on anticoagulation therapy    (+) hypertension well controlled, CAD, PVD (s/p BKA and stenting), hyperlipidemia      Neuro/Psych  (+) numbness, psychiatric history Anxiety and Depression  GI/Hepatic/Renal/Endo    (+) GERD well controlled, diabetes mellitus type 2 well controlled    Musculoskeletal     (+) arthralgias, gait problem  Abdominal    Substance History      OB/GYN          Other   arthritis,     ROS/Med Hx Other: 4/29 - Plavix and Xarelto given  Left hip fracture    ·  Left ventricular systolic function is normal. Left ventricular ejection fraction appears to be 61 - 65%.  ·  Left ventricular diastolic function is consistent with (grade I) impaired relaxation                Anesthesia Plan    ASA 3     general     intravenous induction     Anesthetic plan, risks, benefits, and alternatives have been provided, discussed and informed consent has been obtained with: patient.    Use of blood products discussed with patient .    Plan discussed with CRNA and CAA.    CODE STATUS:

## 2024-05-01 NOTE — PLAN OF CARE
Goal Outcome Evaluation:      Alert and oriented x4. No new concerns at this time. Call light in reach.

## 2024-05-01 NOTE — ANESTHESIA POSTPROCEDURE EVALUATION
Patient: Constanza Mccall    Procedure Summary       Date: 05/01/24 Room / Location: [facility] OR 06 / [facility] MAIN OR    Anesthesia Start: 1057 Anesthesia Stop: 1230    Procedure: TOTAL HIP ARTHROPLASTY ANTERIOR WITH TRACTION PIN PLACEMENT (Left: Hip) Diagnosis:       Closed fracture of neck of left femur, initial encounter      (Closed fracture of neck of left femur, initial encounter [S72.002A])    Surgeons: Michi Solis II, MD Provider: Slick Santiago MD    Anesthesia Type: general ASA Status: 3            Anesthesia Type: general    Vitals  Vitals Value Taken Time   /38 05/01/24 1341   Temp 98.8 °F (37.1 °C) 05/01/24 1230   Pulse 58 05/01/24 1344   Resp 14 05/01/24 1330   SpO2 99 % 05/01/24 1344   Vitals shown include unfiled device data.        Post Anesthesia Care and Evaluation    Patient location during evaluation: PACU  Patient participation: complete - patient participated  Level of consciousness: awake  Pain score: 3 (See nurse's notes for pain score)  Pain management: adequate    Airway patency: patent  Anesthetic complications: No anesthetic complications  PONV Status: none  Cardiovascular status: acceptable  Respiratory status: acceptable and spontaneous ventilation  Hydration status: acceptable    Comments: Patient seen and examined postoperatively; vital signs stable; SpO2 greater than or equal to 90%; cardiopulmonary status stable; nausea/vomiting adequately controlled; pain adequately controlled; no apparent anesthesia complications; patient discharged from anesthesia care when discharge criteria were met

## 2024-05-01 NOTE — PLAN OF CARE
Goal Outcome Evaluation:      Patient admitted to 2a this am r/t L femoral neck fx. C/o severe pain upon arrival and gave analgesic. Patient reported 5/10 pain post analgesic administration. Turned right. Planned L hip arthroplasty today at noon. Consent obtained and patient appears to be resting with call light in reach.

## 2024-05-01 NOTE — SIGNIFICANT NOTE
05/01/24 1543   Living Situation   Current Living Arrangements home   Potentially Unsafe Housing Conditions other (see comments)   Food Insecurity   Within the past 12 months, you worried that your food would run out before you got the money to buy more. Never true   Within the past 12 months, the food you bought just didn't last and you didn't have money to get more. Never true   Transportation Needs   In the past 12 months, has lack of transportation kept you from medical appointments or from getting medications? no   In the past 12 months, has lack of transportation kept you from meetings, work, or from getting things needed for daily living? No   Utilities   In the past 12 months has the electric, gas, oil, or water company threatened to shut off services in your home? No   Abuse Screen (yes response referral indicated)   Feels Unsafe at Home or Work/School no   Feels Threatened by Someone no   Does Anyone Try to Keep You From Having Contact with Others or Doing Things Outside Your Home? no   Physical Signs of Abuse Present no   Financial Resource Strain   How hard is it for you to pay for the very basics like food, housing, medical care, and heating? Somewhat   Employment   Do you want help finding or keeping work or a job? I do not need or want help   Family and Community Support   If for any reason you need help with day-to-day activities such as bathing, preparing meals, shopping, managing finances, etc., do you get the help you need? I don't need any help   How often do you feel lonely or isolated from those around you? Rarely   Education   Preferred Language English   Do you want help with school or training? For example, starting or completing job training or getting a high school diploma, GED or equivalent No   Physical Activity   On average, how many days per week do you engage in moderate to strenuous exercise (like a brisk walk)? 0 days   On average, how many minutes do you engage in exercise at  this level? 0 min   Number of minutes of exercise per week (!) 0   Alcohol Use   Q1: How often do you have a drink containing alcohol? Never   Q2: How many drinks containing alcohol do you have on a typical day when you are drinking? None   Q3: How often do you have six or more drinks on one occasion? Never   Stress   Do you feel stress - tense, restless, nervous, or anxious, or unable to sleep at night because your mind is troubled all the time - these days? To some exte   Mental Health   Little Interest or Pleasure in Doing Things 3-->nearly every day   Feeling Down, Depressed or Hopeless 3-->nearly every day   Disabilities   Concentrating, Remembering or Making Decisions Difficulty no   Doing Errands Independently Difficulty (such as shopping) no

## 2024-05-01 NOTE — CASE MANAGEMENT/SOCIAL WORK
Continued Stay Note   Stef     Patient Name: Constanza Mccall  MRN: [id number]  Today's Date: 5/1/2024    Admit Date: 4/30/2024        Discharge Plan       Row Name 05/01/24 1100       Plan    Plan Comments Barriers: CM attempted interview but out of room for TOTAL HIP ARTHROPLASTY ANTERIOR                          Marilyn RUTH,RN Case Manager  [facility]  Phone: Desk- [phone] cell- [phone]

## 2024-05-01 NOTE — NURSING NOTE
Patient belongings including prosthetic, foot wear, clothing, handbag, phone and  returned to patients bedside in PACU.

## 2024-05-01 NOTE — PROGRESS NOTES
[facility] MEDICINE SERVICE  DAILY PROGRESS NOTE    NAME: Constanza Mccall  : 1975  MRN: [id number]      LOS: 1 day     PROVIDER OF SERVICE: KRISHNA Zeng    Chief Complaint: Closed fracture of neck of left femur    Subjective:     Interval History:  History taken from: patient  Patient Complaints: Left hip pain  Patient Denies: SOB, CP, dizziness, headache    Review of Systems:   Review of Systems   Respiratory:  Negative for shortness of breath.    Cardiovascular:  Negative for chest pain.   Musculoskeletal:  Positive for arthralgias.   Neurological:  Negative for dizziness and headaches.       Objective:     Vital Signs  Temp:  [97.7 °F (36.5 °C)-98.8 °F (37.1 °C)] 98.4 °F (36.9 °C)  Heart Rate:  [57-68] 62  Resp:  [10-24] 17  BP: ()/(33-70) 105/48  Flow (L/min):  [8] 8   Body mass index is 27.59 kg/m².    Physical Exam  Physical Exam  Constitutional:       Appearance: Normal appearance.   HENT:      Head: Normocephalic and atraumatic.      Nose: Nose normal.      Mouth/Throat:      Mouth: Mucous membranes are moist.      Pharynx: Oropharynx is clear.   Eyes:      Extraocular Movements: Extraocular movements intact.      Conjunctiva/sclera: Conjunctivae normal.      Pupils: Pupils are equal, round, and reactive to light.   Cardiovascular:      Rate and Rhythm: Normal rate and regular rhythm.      Pulses: Normal pulses.   Pulmonary:      Effort: Pulmonary effort is normal.   Abdominal:      General: Abdomen is flat.      Palpations: Abdomen is soft.   Musculoskeletal:         General: Tenderness present. Normal range of motion.      Cervical back: Normal range of motion and neck supple.   Skin:     General: Skin is warm and dry.   Neurological:      General: No focal deficit present.      Mental Status: She is alert and oriented to person, place, and time. Mental status is at baseline.   Psychiatric:         Mood and Affect: Mood normal.         Behavior: Behavior normal.          Thought Content: Thought content normal.         Judgment: Judgment normal.         Scheduled Meds   atorvastatin, 40 mg, Oral, Nightly  citalopram, 20 mg, Oral, Daily  [Held by provider] clopidogrel, 75 mg, Oral, Daily  insulin glargine, 20 Units, Subcutaneous, Nightly  [Transfer Hold] insulin lispro, 2-7 Units, Subcutaneous, 4x Daily AC & at Bedtime  lisinopril, 5 mg, Oral, Daily  [Held by provider] metFORMIN, 1,000 mg, Oral, BID With Meals  [Held by provider] Rivaroxaban, 2.5 mg, Oral, BID With Meals  sodium chloride, 10 mL, Intravenous, Q12H       PRN Meds     albuterol    senna-docusate sodium **AND** polyethylene glycol **AND** bisacodyl **AND** bisacodyl    Calcium Replacement - Follow Nurse / BPA Driven Protocol    [Transfer Hold] dextrose    [Transfer Hold] dextrose    [Transfer Hold] glucagon (human recombinant)    [Transfer Hold] ipratropium-albuterol    Magnesium Standard Dose Replacement - Follow Nurse / BPA Driven Protocol    [Transfer Hold] Morphine    nitroglycerin    ondansetron ODT **OR** ondansetron    [Transfer Hold] oxyCODONE    Phosphorus Replacement - Follow Nurse / BPA Driven Protocol    Potassium Replacement - Follow Nurse / BPA Driven Protocol    [COMPLETED] Insert Peripheral IV **AND** [Transfer Hold] sodium chloride    sodium chloride    sodium chloride   Infusions  lactated ringers, 1,000 mL, Last Rate: 1,000 mL (05/01/24 1046)          Diagnostic Data    Results from last 7 days   Lab Units 04/30/24  1653   WBC 10*3/mm3 11.29*   HEMOGLOBIN g/dL 14.2   HEMATOCRIT % 43.3   PLATELETS 10*3/mm3 203   GLUCOSE mg/dL 97   CREATININE mg/dL 0.71   BUN mg/dL 12   SODIUM mmol/L 139   POTASSIUM mmol/L 3.9   ANION GAP mmol/L 12.0       XR Chest 1 View    Result Date: 4/30/2024  Impression: No acute chest finding. Electronically Signed: Christina Wills MD  4/30/2024 4:08 PM EDT  Workstation ID: [id number]    XR Hip With or Without Pelvis 2 - 3 View Left    Result Date: 4/30/2024  1. Incomplete  nondisplaced fracture of the left femoral neck. No dislocation 2. Moderate degenerative change of the left hip. Electronically Signed: Christina Wills MD  4/30/2024 3:47 PM EDT  Workstation ID: [id number]       I reviewed the patient's new clinical results.    Assessment/Plan:     Active and Resolved Problems  Active Hospital Problems    Diagnosis  POA    **Closed fracture of neck of left femur [S72.002A]  Unknown    Hip fracture [S72.009A]  Yes    Hypertension [I10]  Yes    Hyperlipidemia [E78.5]  Yes    CAD (coronary artery disease) [I25.10]  Yes    Peripheral vascular disease [I73.9]  Yes    Type 2 diabetes mellitus with complication, without long-term current use of insulin [E11.8]  Yes    Neuropathy [G62.9]  Yes      Resolved Hospital Problems   No resolved problems to display.       Left femoral neck fracture  Orthopedic consult, patient underwent left anterior total hip arthroplasty today  Pain management  PT OT  consult pending  DVT prophylaxis  Hold Xarelto, Plavix until cleared by Ortho to restart     Hypertension   chronic essential   continue lisinopril   monitor blood pressure closely    Hyperlipidemia   continue statins     Type 2 diabetes   hold p.o. meds   sliding scale   continue home Lantus  Accu-Chek ACHS while eating, every 6 hours while NPO  Hemoglobin A1c 6.2    Leukocytosis  likely reactive  procalcitonin normal  monitor CBC  A.m. labs ordered    Depression chronic   continue Celexa    DVT prophylaxis:  Medical and mechanical DVT prophylaxis orders are present.         Code status is   There are no questions and answers to display.       Plan for disposition: Pending PT/OT brendaal 5-3-2024    Time: 30 minutes    Signature: Electronically signed by KRISHNA Zeng, 05/01/24, 13:54 EDT.  [facility] Hospitalist Team

## 2024-05-01 NOTE — ANESTHESIA PROCEDURE NOTES
Airway  Urgency: elective    Date/Time: 5/1/2024 11:10 AM  Airway not difficult    General Information and Staff    Patient location during procedure: OR  Anesthesiologist: Slick Santiago MD  CRNA/CAA: Nathalia Abdul CRNA    Indications and Patient Condition  Indications for airway management: airway protection    Preoxygenated: yes  MILS maintained throughout  Mask difficulty assessment: 1 - vent by mask    Final Airway Details  Final airway type: endotracheal airway      Successful airway: ETT  Cuffed: yes   Successful intubation technique: RSI  Facilitating devices/methods: intubating stylet  Endotracheal tube insertion site: oral  Blade: Laughlin  Blade size: 3  ETT size (mm): 7.0  Cormack-Lehane Classification: grade I - full view of glottis  Placement verified by: chest auscultation and capnometry   Measured from: lips  ETT/EBT  to lips (cm): 21  Number of attempts at approach: 1  Assessment: lips, teeth, and gum same as pre-op and atraumatic intubation

## 2024-05-01 NOTE — CASE MANAGEMENT/SOCIAL WORK
Discharge Planning Assessment   Stef     Patient Name: Constanza Mccall  MRN: [id number]  Today's Date: 5/1/2024    Admit Date: 4/30/2024    Plan: Lives at aunt's home with multiple other family members and dogs, possible SNF pending PT/OT eval.   Discharge Needs Assessment       Row Name 05/01/24 1515       Living Environment    People in Home other (see comments)    Unique Family Situation lives with aunt, niece, sister, nephew    Current Living Arrangements home    Potentially Unsafe Housing Conditions other (see comments)  multiple family members living together with multiple dogs    In the past 12 months has the electric, gas, oil, or water company threatened to shut off services in your home? No    Primary Care Provided by self    Provides Primary Care For no one    Family Caregiver if Needed other (see comments);sibling(s)    Family Caregiver Names Marilyn- aunt, Astrid - sister    Quality of Family Relationships helpful;involved;supportive    Able to Return to Prior Arrangements yes    Living Arrangement Comments lives with aunt and multiple other family members/dogs - her current home is destroyed and selling to realty company       Resource/Environmental Concerns    Resource/Environmental Concerns none    Transportation Concerns none       Transportation Needs    In the past 12 months, has lack of transportation kept you from medical appointments or from getting medications? no    In the past 12 months, has lack of transportation kept you from meetings, work, or from getting things needed for daily living? No       Food Insecurity    Within the past 12 months, you worried that your food would run out before you got the money to buy more. Never true    Within the past 12 months, the food you bought just didn't last and you didn't have money to get more. Never true       Transition Planning    Patient/Family Anticipates Transition to home    Patient/Family Anticipated Services at Transition none     Transportation Anticipated car, drives self;family or friend will provide       Discharge Needs Assessment    Readmission Within the Last 30 Days no previous admission in last 30 days    Equipment Currently Used at Home prosthesis;glucometer;walker, standard    Concerns to be Addressed discharge planning    Anticipated Changes Related to Illness none    Equipment Needed After Discharge none    Outpatient/Agency/Support Group Needs skilled nursing facility    Discharge Facility/Level of Care Needs nursing facility, skilled                   Discharge Plan       Row Name 05/01/24 1534       Plan    Plan Lives at aunt's home with multiple other family members and dogs, possible SNF pending PT/OT eval.    Patient/Family in Agreement with Plan yes    Plan Comments Patient lives at aunt's home with multiple other family members and dogs. Family will transport at discharge. Patient performs ADLs but has a L BKA prosthesis and uses a RW (broken wheel). PCP and pharmacy confirmed. Agreeable to M2B.  Denies financial assistance needs for medication and/or food. Denies any current HH, Caregiver, or rehab services.  DC Barriers:  IV pain meds, PT/OT eval pending, Ortho following.                  Continued Care and Services - Admitted Since 4/30/2024    No active coordination exists for this encounter.       Expected Discharge Date and Time       Expected Discharge Date Expected Discharge Time    May 3, 2024            Demographic Summary       Row Name 05/01/24 1514       General Information    Admission Type inpatient    Arrived From emergency department    Referral Source admission list    Reason for Consult discharge planning    Preferred Language English                   Functional Status       Row Name 05/01/24 1515       Functional Status    Usual Activity Tolerance moderate    Current Activity Tolerance moderate       Functional Status, IADL    Medications independent    Meal Preparation independent    Housekeeping  independent    Laundry independent    Shopping assistive equipment and person       Mental Status    General Appearance WDL WDL       Mental Status Summary    Recent Changes in Mental Status/Cognitive Functioning no changes           O. Jackelyn Cheatham RN  SIPS/ICU   O: [phone]  C: [phone]  [email or web address]

## 2024-05-01 NOTE — THERAPY EVALUATION
Patient Name: Constanza Mccall  : 1975    MRN: [id number]                              Today's Date: 2024       Admit Date: 2024    Visit Dx:     ICD-10-CM ICD-9-CM   1. Closed fracture of neck of left femur, initial encounter  S72.002A 820.8   2. Left hip pain  M25.552 719.45   3. Fall, initial encounter  W19.XXXA E888.9     Patient Active Problem List   Diagnosis    Type 2 diabetes mellitus with complication, without long-term current use of insulin    Peripheral vascular disease    CAD (coronary artery disease)    Ulcer of toe    Superficial femoral artery occlusion    Neuropathy    Family history of diabetes mellitus    Hypertension    Hyperlipidemia    Moderate episode of recurrent major depressive disorder    Closed fracture of neck of left femur    Hip fracture     Past Medical History:   Diagnosis Date    Coronary artery disease     Diabetes mellitus     History of echocardiogram 2018    EF 60% Normal LV diastolic filling parameters. There is normal LV wall thickness. Normla trileaflet aortic valve. Normal mitral valve. Normal LV/RV fucntion with no significant valvulopathy. Normla right anf left pressures estimated.     Hyperlipidemia 2023    Hypertension 2023    Neuropathy     Numbness and tingling sensation of skin 2017    Peripheral artery disease     Rash of hands 2017    Type 2 diabetes mellitus      Past Surgical History:   Procedure Laterality Date    AMPUTATION DIGIT Left 6/3/2022    Procedure: INCISION AND DRAINAGE WITH AMPUTATION OF LEFT 3RD TOE;  Surgeon: Deonte Heath Jr., DPM;  Location: [facility] OR;  Service: Podiatry;  Laterality: Left;    CARDIAC CATHETERIZATION N/A 10/15/2019    Procedure: Left Heart Cath;  Surgeon: Merrick Sarmiento MD;  Location: [facility] CATH INVASIVE LOCATION;  Service: Cardiology    CHOLECYSTECTOMY      OOPHORECTOMY        General Information       Row Name 24 1640          Physical Therapy Time and  Intention    Document Type evaluation  -MB     Mode of Treatment physical therapy  -MB       Row Name 05/01/24 1640          General Information    Patient Profile Reviewed yes  -MB     Prior Level of Function independent:;all household mobility;community mobility;gait;transfer;ADL's;home management  -MB     Existing Precautions/Restrictions --  WBAT LLE, LLE prosthesis for ambulation  -MB     Barriers to Rehab medically complex  -MB       Row Name 05/01/24 1640          Living Environment    People in Home other (see comments)  Aunt and several other family members  -MB       Row Name 05/01/24 1640          Home Main Entrance    Number of Stairs, Main Entrance none  -MB       Row Name 05/01/24 1640          Stairs Within Home, Primary    Number of Stairs, Within Home, Primary none  -MB       Row Name 05/01/24 1640          Cognition    Orientation Status (Cognition) oriented x 4  -MB       Row Name 05/01/24 1640          Safety Issues, Functional Mobility    Impairments Affecting Function (Mobility) endurance/activity tolerance;pain;strength  -MB               User Key  (r) = Recorded By, (t) = Taken By, (c) = Cosigned By      Initials Name Provider Type    MB Cesario Mccallum, PT Physical Therapist                   Mobility       Row Name 05/01/24 1641          Bed Mobility    Bed Mobility bed mobility (all) activities  -MB     All Activities, [place] (Bed Mobility) independent  -MB       Row Name 05/01/24 1641          Sit-Stand Transfer    Sit-Stand [place] (Transfers) contact guard  -MB     Assistive Device (Sit-Stand Transfers) walker, front-wheeled  -MB       Row Name 05/01/24 1641          Gait/Stairs (Locomotion)    [place] Level (Gait) contact guard  -MB     Patient was able to Ambulate yes  -MB     Distance in Feet (Gait) 20  -MB     Deviations/Abnormal Patterns (Gait) weight shifting decreased  -MB     Left Sided Gait Deviations weight shift ability decreased  -MB     Comment, (Gait/Stairs)  20' CGA with RW  -MB               User Key  (r) = Recorded By, (t) = Taken By, (c) = Cosigned By      Initials Name Provider Type    Cesario Anderson PT Physical Therapist                   Obj/Interventions       Row Name 05/01/24 1643          Range of Motion Comprehensive    General Range of Motion no range of motion deficits identified  -MB       Row Name 05/01/24 1643          Strength Comprehensive (MMT)    Comment, General Manual Muscle Testing (MMT) Assessment RLE Strength 4/5 grossly  -MB       Row Name 05/01/24 1643          Balance    Balance Assessment sitting static balance;sitting dynamic balance;sit to stand dynamic balance;standing static balance;standing dynamic balance  -MB     Static Sitting Balance independent  -MB     Dynamic Sitting Balance independent  -MB     Position, Sitting Balance unsupported;sitting edge of bed  -MB     Sit to Stand Dynamic Balance standby assist  -MB     Static Standing Balance standby assist  -MB     Dynamic Standing Balance contact guard  -MB       Row Name 05/01/24 1643          Sensory Assessment (Somatosensory)    Sensory Assessment (Somatosensory) sensation intact  -MB               User Key  (r) = Recorded By, (t) = Taken By, (c) = Cosigned By      Initials Name Provider Type    Cesario Anderson PT Physical Therapist                   Goals/Plan       Row Name 05/01/24 1643          Bed Mobility Goal 1 (PT)    Activity/Assistive Device (Bed Mobility Goal 1, PT) bed mobility activities, all  -MB     [place] Level/Cues Needed (Bed Mobility Goal 1, PT) independent  -MB     Time Frame (Bed Mobility Goal 1, PT) long term goal (LTG);2 weeks  -MB       Row Name 05/01/24 1648          Transfer Goal 1 (PT)    Activity/Assistive Device (Transfer Goal 1, PT) transfers, all;walker, rolling  -MB     [place] Level/Cues Needed (Transfer Goal 1, PT) independent  -MB     Time Frame (Transfer Goal 1, PT) long term goal (LTG);2 weeks  -MB       Row Name 05/01/24 1386           Gait Training Goal 1 (PT)    Activity/Assistive Device (Gait Training Goal 1, PT) gait (walking locomotion);walker, rolling  -MB     [place] Level (Gait Training Goal 1, PT) standby assist  -MB     Distance (Gait Training Goal 1, PT) 50  -MB     Time Frame (Gait Training Goal 1, PT) long term goal (LTG);2 weeks  -MB       Row Name 05/01/24 1646          Therapy Assessment/Plan (PT)    Planned Therapy Interventions (PT) balance training;bed mobility training;gait training;home exercise program;neuromuscular re-education;patient/family education;prosthetic fitting/training;strengthening;transfer training  -MB               User Key  (r) = Recorded By, (t) = Taken By, (c) = Cosigned By      Initials Name Provider Type    Cesario Anderson, PT Physical Therapist                   Clinical Impression       Row Name 05/01/24 1644          Pain    Pretreatment Pain Rating 0/10 - no pain  -MB     Posttreatment Pain Rating 0/10 - no pain  -MB       Row Name 05/01/24 1649 05/01/24 1649       Plan of Care Review    Plan of Care Reviewed With -- patient  -MB    Progress -- improving  -MB    Outcome Evaluation Pt is a 48 y/o F admitted to [facility] on 4/30/24 with complaints of L hip pain following a fall earlier in the day when wheel fell off walker, causing her to fall on her hip. XR L Hip (+) for incomplete nondisplaced fracture of the left femoral neck and she is now s/p L aTHA with Dr. Solis on 5/1/24. 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 Per orders in the chart, pt is to be WBAT on the LLE. She is currently living with aunt and several other family members in [facility] with no steps to enter. At baseline, she is normally IND with household mobility and ADLs with no AD. Owns LLE prosthesis that she dons IND and wears throughout the day. No AD needed for household mobility, but uses RW for community ambulation. No home O2 and does not drive. This date, she is AAOx4 and lying supine in bed. She completes bed mobility IND,  transfers CGA, and amb 20' CGA with RW this date. Able to don prosthesis IND, ambulating with antalgic gait and decreased WB on the LLE. Pt has had several falls at home, similar to fall she experienced on her admission. She is concerned about returning home in this condition and feels safer staying at SNF for short-term to increase activity levels. She completes activities well this date, but would still benefit from SNF for continued rehab and prosthetic training to prevent falls in the household. PT will continue to follow.  -MB --      Row Name 05/01/24 1644          Therapy Assessment/Plan (PT)    Rehab Potential (PT) good, to achieve stated therapy goals  -MB     Criteria for Skilled Interventions Met (PT) yes;meets criteria  -MB     Therapy Frequency (PT) 5 times/wk  -MB     Predicted Duration of Therapy Intervention (PT) until d/c  -MB       Row Name 05/01/24 1644          Vital Signs    O2 Delivery Pre Treatment room air  -MB     O2 Delivery Intra Treatment room air  -MB     O2 Delivery Post Treatment room air  -MB     Pre Patient Position Supine  -MB     Intra Patient Position Standing  -MB     Post Patient Position Supine  -MB       Row Name 05/01/24 1644          Positioning and Restraints    Pre-Treatment Position in bed  -MB     Post Treatment Position bed  -MB     In Bed notified nsg;supine;call light within reach;encouraged to call for assist;exit alarm on  -MB               User Key  (r) = Recorded By, (t) = Taken By, (c) = Cosigned By      Initials Name Provider Type    Cesario Anderson, PT Physical Therapist                   Outcome Measures       Row Name 05/01/24 2834 05/01/24 0800       How much help from another person do you currently need...    Turning from your back to your side while in flat bed without using bedrails? 4  -MB 3  -AC    Moving from lying on back to sitting on the side of a flat bed without bedrails? 4  -MB 2  -AC    Moving to and from a bed to a chair (including a  wheelchair)? 3  -MB 2  -AC    Standing up from a chair using your arms (e.g., wheelchair, bedside chair)? 3  -MB 2  -AC    Climbing 3-5 steps with a railing? 2  -MB 1  -AC    To walk in hospital room? 3  -MB 2  -AC    AM-PAC 6 Clicks Score (PT) 19  -MB 12  -AC    Highest Level of Mobility Goal 6 --> Walk 10 steps or more  -MB 4 --> Transfer to chair/commode  -AC              User Key  (r) = Recorded By, (t) = Taken By, (c) = Cosigned By      Initials Name Provider Type    AC Carlota Gregorio RN Registered Nurse    Cesario Anderson, PT Physical Therapist                                 Physical Therapy Education       Title: PT OT SLP Therapies (Done)       Topic: Physical Therapy (Done)       Point: Mobility training (Done)       Learning Progress Summary             Patient Acceptance, E,TB, VU by MB at 5/1/2024 1645                         Point: Body mechanics (Done)       Learning Progress Summary             Patient Acceptance, E,TB, VU by MB at 5/1/2024 1645                         Point: Precautions (Done)       Learning Progress Summary             Patient Acceptance, E,TB, VU by MB at 5/1/2024 1645                                         User Key       Initials Effective Dates Name Provider Type Discipline    MB 06/06/23 -  Cesario Mccallum, PT Physical Therapist PT                  PT Recommendation and Plan  Planned Therapy Interventions (PT): balance training, bed mobility training, gait training, home exercise program, neuromuscular re-education, patient/family education, prosthetic fitting/training, strengthening, transfer training  Plan of Care Reviewed With: patient  Progress: improving  Outcome Evaluation: Pt is a 50 y/o F admitted to [facility] on 4/30/24 with complaints of L hip pain following a fall earlier in the day when wheel fell off walker, causing her to fall on her hip. XR L Hip (+) for incomplete nondisplaced fracture of the left femoral neck and she is now s/p L aTHA with Dr. Solis on 5/1/24. 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 Per orders in the chart, pt is to be WBAT on the LLE. She is currently living with aunt and several other family members in [facility] with no steps to enter. At baseline, she is normally IND with household mobility and ADLs with no AD. Owns LLE prosthesis that she dons IND and wears throughout the day. No AD needed for household mobility, but uses RW for community ambulation. No home O2 and does not drive. This date, she is AAOx4 and lying supine in bed. She completes bed mobility IND, transfers CGA, and amb 20' CGA with RW this date. Able to don prosthesis IND, ambulating with antalgic gait and decreased WB on the LLE. Pt has had several falls at home, similar to fall she experienced on her admission. She is concerned about returning home in this condition and feels safer staying at SNF for short-term to increase activity levels. She completes activities well this date, but would still benefit from SNF for continued rehab and prosthetic training to prevent falls in the household. PT will continue to follow.     Time Calculation:   PT Evaluation Complexity  History, PT Evaluation Complexity: 1-2 personal factors and/or comorbidities  Examination of Body Systems (PT Eval Complexity): total of 3 or more elements  Clinical Presentation (PT Evaluation Complexity): evolving  Clinical Decision Making (PT Evaluation Complexity): moderate complexity  Overall Complexity (PT Evaluation Complexity): moderate complexity     PT Charges       Row Name 05/01/24 1645             Time Calculation    Start Time 1540  -MB      Stop Time 1610  -MB      Time Calculation (min) 30 min  -MB      PT Received On 05/01/24  -MB      PT - Next Appointment 05/02/24  -MB      PT Goal Re-Cert Due Date 05/15/24  -MB         Time Calculation- PT    Total Timed Code Minutes- PT 0 minute(s)  -MB                User Key  (r) = Recorded By, (t) = Taken By, (c) = Cosigned By      Initials Name Provider Type    KRZYSZTOF Mccallum  Cesario, PT Physical Therapist                  Therapy Charges for Today       Code Description Service Date Service Provider Modifiers Qty    18277270120 HC PT EVAL MOD COMPLEXITY 4 5/1/2024 Cesario Mccallum, PT GP 1            PT G-Codes  AM-PAC 6 Clicks Score (PT): 19  PT Discharge Summary  Anticipated Discharge Disposition (PT): skilled nursing facility    Cesario Mccallum PT  5/1/2024

## 2024-05-01 NOTE — PLAN OF CARE
Goal Outcome Evaluation:         Patient is A & O X 4. The patient has had no complaints of pain. Patient is able to make her needs known and the call light is with in reach. Will continue with the patients care.

## 2024-05-02 LAB
BASOPHILS # BLD AUTO: 0.02 10*3/MM3 (ref 0–0.2)
BASOPHILS NFR BLD AUTO: 0.3 % (ref 0–1.5)
BH BB BLOOD EXPIRATION DATE: NORMAL
BH BB BLOOD TYPE BARCODE: 6200
BH BB DISPENSE STATUS: NORMAL
BH BB PRODUCT CODE: NORMAL
BH BB UNIT NUMBER: NORMAL
BILIRUB UR QL STRIP: NEGATIVE
CLARITY UR: CLEAR
COLOR UR: YELLOW
DEPRECATED RDW RBC AUTO: 46.2 FL (ref 37–54)
EOSINOPHIL # BLD AUTO: 0.08 10*3/MM3 (ref 0–0.4)
EOSINOPHIL NFR BLD AUTO: 1.3 % (ref 0.3–6.2)
ERYTHROCYTE [DISTWIDTH] IN BLOOD BY AUTOMATED COUNT: 13.1 % (ref 12.3–15.4)
GLUCOSE BLDC GLUCOMTR-MCNC: 107 MG/DL (ref 70–105)
GLUCOSE BLDC GLUCOMTR-MCNC: 145 MG/DL (ref 70–105)
GLUCOSE BLDC GLUCOMTR-MCNC: 177 MG/DL (ref 70–105)
GLUCOSE BLDC GLUCOMTR-MCNC: 184 MG/DL (ref 70–105)
GLUCOSE UR STRIP-MCNC: ABNORMAL MG/DL
HCT VFR BLD AUTO: 29.3 % (ref 34–46.6)
HGB BLD-MCNC: 9.1 G/DL (ref 12–15.9)
HGB UR QL STRIP.AUTO: NEGATIVE
IMM GRANULOCYTES # BLD AUTO: 0.02 10*3/MM3 (ref 0–0.05)
IMM GRANULOCYTES NFR BLD AUTO: 0.3 % (ref 0–0.5)
KETONES UR QL STRIP: NEGATIVE
LEUKOCYTE ESTERASE UR QL STRIP.AUTO: NEGATIVE
LYMPHOCYTES # BLD AUTO: 1.68 10*3/MM3 (ref 0.7–3.1)
LYMPHOCYTES NFR BLD AUTO: 28.1 % (ref 19.6–45.3)
MAGNESIUM SERPL-MCNC: 1.8 MG/DL (ref 1.6–2.6)
MCH RBC QN AUTO: 30 PG (ref 26.6–33)
MCHC RBC AUTO-ENTMCNC: 31.1 G/DL (ref 31.5–35.7)
MCV RBC AUTO: 96.7 FL (ref 79–97)
MONOCYTES # BLD AUTO: 0.52 10*3/MM3 (ref 0.1–0.9)
MONOCYTES NFR BLD AUTO: 8.7 % (ref 5–12)
NEUTROPHILS NFR BLD AUTO: 3.66 10*3/MM3 (ref 1.7–7)
NEUTROPHILS NFR BLD AUTO: 61.3 % (ref 42.7–76)
NITRITE UR QL STRIP: NEGATIVE
NRBC BLD AUTO-RTO: 0 /100 WBC (ref 0–0.2)
PH UR STRIP.AUTO: <=5 [PH] (ref 5–8)
PLATELET # BLD AUTO: 153 10*3/MM3 (ref 140–450)
PMV BLD AUTO: 10.8 FL (ref 6–12)
PROT UR QL STRIP: NEGATIVE
RBC # BLD AUTO: 3.03 10*6/MM3 (ref 3.77–5.28)
SP GR UR STRIP: 1.02 (ref 1–1.03)
UNIT  ABO: NORMAL
UNIT  RH: NORMAL
UROBILINOGEN UR QL STRIP: ABNORMAL
WBC NRBC COR # BLD AUTO: 5.98 10*3/MM3 (ref 3.4–10.8)

## 2024-05-02 PROCEDURE — 82948 REAGENT STRIP/BLOOD GLUCOSE: CPT

## 2024-05-02 PROCEDURE — 25010000002 CEFAZOLIN PER 500 MG: Performed by: ORTHOPAEDIC SURGERY

## 2024-05-02 PROCEDURE — 97166 OT EVAL MOD COMPLEX 45 MIN: CPT

## 2024-05-02 PROCEDURE — 81003 URINALYSIS AUTO W/O SCOPE: CPT | Performed by: ORTHOPAEDIC SURGERY

## 2024-05-02 PROCEDURE — 97116 GAIT TRAINING THERAPY: CPT

## 2024-05-02 PROCEDURE — 83735 ASSAY OF MAGNESIUM: CPT | Performed by: ORTHOPAEDIC SURGERY

## 2024-05-02 PROCEDURE — 97530 THERAPEUTIC ACTIVITIES: CPT

## 2024-05-02 PROCEDURE — 85025 COMPLETE CBC W/AUTO DIFF WBC: CPT | Performed by: HOSPITALIST

## 2024-05-02 PROCEDURE — 63710000001 INSULIN LISPRO (HUMAN) PER 5 UNITS: Performed by: ORTHOPAEDIC SURGERY

## 2024-05-02 PROCEDURE — 25010000002 MORPHINE PER 10 MG: Performed by: ORTHOPAEDIC SURGERY

## 2024-05-02 PROCEDURE — 63710000001 INSULIN GLARGINE PER 5 UNITS: Performed by: ORTHOPAEDIC SURGERY

## 2024-05-02 PROCEDURE — 82948 REAGENT STRIP/BLOOD GLUCOSE: CPT | Performed by: ORTHOPAEDIC SURGERY

## 2024-05-02 PROCEDURE — 25010000002 MORPHINE PER 10 MG: Performed by: HOSPITALIST

## 2024-05-02 RX ORDER — MORPHINE SULFATE 2 MG/ML
2 INJECTION, SOLUTION INTRAMUSCULAR; INTRAVENOUS EVERY 4 HOURS PRN
Status: DISCONTINUED | OUTPATIENT
Start: 2024-05-02 | End: 2024-05-03 | Stop reason: HOSPADM

## 2024-05-02 RX ORDER — OXYCODONE HYDROCHLORIDE 5 MG/1
10 TABLET ORAL EVERY 4 HOURS PRN
Status: DISCONTINUED | OUTPATIENT
Start: 2024-05-02 | End: 2024-05-03 | Stop reason: HOSPADM

## 2024-05-02 RX ADMIN — OXYCODONE 10 MG: 5 TABLET ORAL at 18:49

## 2024-05-02 RX ADMIN — INSULIN LISPRO 2 UNITS: 100 INJECTION, SOLUTION INTRAVENOUS; SUBCUTANEOUS at 20:08

## 2024-05-02 RX ADMIN — Medication 10 ML: at 09:46

## 2024-05-02 RX ADMIN — ATORVASTATIN CALCIUM 40 MG: 40 TABLET, FILM COATED ORAL at 20:08

## 2024-05-02 RX ADMIN — SODIUM CHLORIDE 2000 MG: 900 INJECTION INTRAVENOUS at 13:16

## 2024-05-02 RX ADMIN — INSULIN GLARGINE 20 UNITS: 100 INJECTION, SOLUTION SUBCUTANEOUS at 20:08

## 2024-05-02 RX ADMIN — SODIUM CHLORIDE 2000 MG: 900 INJECTION INTRAVENOUS at 03:57

## 2024-05-02 RX ADMIN — RIVAROXABAN 2.5 MG: 2.5 TABLET, FILM COATED ORAL at 18:49

## 2024-05-02 RX ADMIN — MORPHINE SULFATE 2 MG: 2 INJECTION, SOLUTION INTRAMUSCULAR; INTRAVENOUS at 13:57

## 2024-05-02 RX ADMIN — INSULIN LISPRO 2 UNITS: 100 INJECTION, SOLUTION INTRAVENOUS; SUBCUTANEOUS at 17:50

## 2024-05-02 RX ADMIN — CITALOPRAM HYDROBROMIDE 20 MG: 20 TABLET ORAL at 09:46

## 2024-05-02 RX ADMIN — Medication 10 ML: at 20:09

## 2024-05-02 RX ADMIN — MORPHINE SULFATE 2 MG: 2 INJECTION, SOLUTION INTRAMUSCULAR; INTRAVENOUS at 09:12

## 2024-05-02 NOTE — PLAN OF CARE
Goal Outcome Evaluation:              Outcome Evaluation: Pt resting in bed with complaints of pain. Pain controlled per MAR. Pt was able to get up with PT today. Safety measures in place. Plan of care on going.

## 2024-05-02 NOTE — DISCHARGE PLACEMENT REQUEST
"Ren Mccall (49 y.o. Female)       Date of Birth   1975    Social Security Number       Address   [address] [place] [address] [place] [facility]    Home Phone   [phone]    MRN   [id number]       Yarsanism   None    Marital Status                               Admission Date   4/30/24    Admission Type   Emergency    Admitting Provider   Lamonte Cummings MD    Attending Provider   Heladio Givens MD    Department, Room/Bed   [facility] SURGICAL INPATIENT, 4128/1       Discharge Date       Discharge Disposition       Discharge Destination                                 Attending Provider: Heladio Givens MD    Allergies: No Known Allergies    Isolation: None   Infection: None   Code Status: Prior    Ht: 175.3 cm (69.02\")   Wt: 84.8 kg (186 lb 15.2 oz)    Admission Cmt: None   Principal Problem: Closed fracture of neck of left femur [S72.002A]                   Active Insurance as of 4/30/2024       Primary Coverage       Payor Plan Insurance Group Employer/Plan Group    [employer] [id number]        Payor Plan Address Payor Plan Phone Number Payor Plan Fax Number Effective Dates    [address]   4/1/2024 - None Entered    [facility] [id number]         Subscriber Name Subscriber Birth Date Member ID       REN MCCALL 1975 [id number]                     Emergency Contacts        (Rel.) Home Phone Work Phone Mobile Phone    Astrid mahoney (Sister) [phone] -- --    Padmini Souza -- -- [phone]                "

## 2024-05-02 NOTE — PROGRESS NOTES
[facility] MEDICINE SERVICE  DAILY PROGRESS NOTE    NAME: Constanza Mccall  : 1975  MRN: [id number]      LOS: 2 days     PROVIDER OF SERVICE: Heladio Givens MD    Chief Complaint: Closed fracture of neck of left femur    Subjective:     Interval History:  History taken from: patient chart RN  Pain controlled    Review of Systems:   Review of Systems  All negative except as above  Objective:     Vital Signs  Temp:  [97.4 °F (36.3 °C)-98.4 °F (36.9 °C)] 98.4 °F (36.9 °C)  Heart Rate:  [54-71] 70  Resp:  [12-20] 18  BP: ()/(38-64) 94/57  Flow (L/min):  [2] 2   Body mass index is 27.59 kg/m².    Physical Exam  Physical Exam  AOx3 NAD  RRR S1-S2 audible  Lungs CTA  Abdomen soft nontender nondistended  Scheduled Meds   atorvastatin, 40 mg, Oral, Nightly  ceFAZolin, 2,000 mg, Intravenous, Q8H  citalopram, 20 mg, Oral, Daily  [Held by provider] clopidogrel, 75 mg, Oral, Daily  insulin glargine, 20 Units, Subcutaneous, Nightly  insulin lispro, 2-7 Units, Subcutaneous, 4x Daily AC & at Bedtime  [Held by provider] lisinopril, 5 mg, Oral, Daily  [Held by provider] metFORMIN, 1,000 mg, Oral, BID With Meals  Rivaroxaban, 2.5 mg, Oral, BID With Meals  sodium chloride, 10 mL, Intravenous, Q12H       PRN Meds     albuterol    senna-docusate sodium **AND** polyethylene glycol **AND** bisacodyl **AND** bisacodyl    Calcium Replacement - Follow Nurse / BPA Driven Protocol    dextrose    dextrose    glucagon (human recombinant)    ipratropium-albuterol    Magnesium Standard Dose Replacement - Follow Nurse / BPA Driven Protocol    Morphine    nitroglycerin    ondansetron ODT **OR** ondansetron    oxyCODONE    Phosphorus Replacement - Follow Nurse / BPA Driven Protocol    Potassium Replacement - Follow Nurse / BPA Driven Protocol    [COMPLETED] Insert Peripheral IV **AND** sodium chloride    sodium chloride    sodium chloride   Infusions         Diagnostic Data    Results from last 7 days   Lab Units  05/02/24  1054 05/01/24  1453   WBC 10*3/mm3 5.98  --    HEMOGLOBIN g/dL 9.1*  --    HEMATOCRIT % 29.3*  --    PLATELETS 10*3/mm3 153  --    GLUCOSE mg/dL  --  183*   CREATININE mg/dL  --  0.71   BUN mg/dL  --  12   SODIUM mmol/L  --  137   POTASSIUM mmol/L  --  4.3   AST (SGOT) U/L  --  18   ALT (SGPT) U/L  --  18   ALK PHOS U/L  --  74   BILIRUBIN mg/dL  --  0.3   ANION GAP mmol/L  --  12.0       XR Hip With or Without Pelvis 1 View Left    Result Date: 5/1/2024  Postoperative changes. Electronically Signed: Jim Varela MD  5/1/2024 4:17 PM EDT  Workstation ID: [id number]    XR Chest 1 View    Result Date: 4/30/2024  Impression: No acute chest finding. Electronically Signed: Christina Wills MD  4/30/2024 4:08 PM EDT  Workstation ID: [id number]    XR Hip With or Without Pelvis 2 - 3 View Left    Result Date: 4/30/2024  1. Incomplete nondisplaced fracture of the left femoral neck. No dislocation 2. Moderate degenerative change of the left hip. Electronically Signed: hCristina Wills MD  4/30/2024 3:47 PM EDT  Workstation ID: [id number]       I reviewed the patient's new clinical results.  I reviewed the patient's new imaging results and agree with the interpretation.    Assessment/Plan:     Active and Resolved Problems  Active Hospital Problems    Diagnosis  POA    **Closed fracture of neck of left femur [S72.002A]  Unknown    Hip fracture [S72.009A]  Yes    Hypertension [I10]  Yes    Hyperlipidemia [E78.5]  Yes    CAD (coronary artery disease) [I25.10]  Yes    Peripheral vascular disease [I73.9]  Yes    Type 2 diabetes mellitus with complication, without long-term current use of insulin [E11.8]  Yes    Neuropathy [G62.9]  Yes      Resolved Hospital Problems   No resolved problems to display.       #Mechanical fall complicated by left femoral neck fracture  Orthopedics on board status post total hip arthroplasty 5/1  WBAT as tolerated  PT/OT will likely need rehab  Resume Xarelto  Pain control with oxycodone as needed.  IV  morphine only for breakthrough  Continue bowel regimen     #Hypertension  BP soft hold lisinopril     #Hyperlipidemia #DM2  continue statins      Type 2 diabetes   Hold OHA  Continue ISS lispro  Continue Lantus     #Acute blood loss anemia  Hemoglobin on admission 11.4 dropped to 9.1 today  Monitor H&H  Transfuse PRBCs as needed to keep hemoglobin more than 7    #Depression chronic   continue Celexa    #PAD  Resume Xarelto  Resume Plavix once H&H stable  Continue statins    DVT prophylaxis:  Medical and mechanical DVT prophylaxis orders are present.         Code status is   There are no questions and answers to display.       Plan for disposition: Anticipate discharge in next 2 to 4 hours    Time: 30 minutes    Signature: Electronically signed by Heladio Givens MD, 05/02/24, 12:42 EDT.  [facility] Hospitalist Team  Pain control with oxycodone as needed.  IV morphine only for breakthrough  Continue bowel regimen

## 2024-05-02 NOTE — THERAPY TREATMENT NOTE
"Subjective: Pt agreeable to therapeutic plan of care.    Objective:     Bed mobility -  Mod I   Transfers -  CGA with RW with LLE prosthesis donned   Ambulation -  50' x 2 with RW with CGA with LLE prosthesis donned.  Pt fatigued at end of ambulation         Vitals: WNL    Pain: 8 VAS   Location: L hip   Intervention for pain: Repositioned, RN provided medication, RN notified, Increased Activity, and Therapeutic Presence    Education: Provided education on the importance of mobility in the acute care setting, Verbal/Tactile Cues, Transfer Training, Gait Training, and Energy conservation strategies    Assessment: Constanza Mccall presents with functional mobility impairments which indicate the need for skilled intervention. Tolerating session today without incident. Pt mod I for bed mobility and donned LLE prosthesis with mod I in preparation for mobility.  Pt performed bed mobility with mod I and completed transfers with RW with CGA.  Pt ambulated 50' x 2 with RW with CGA with decreased endurance and required 2 standing rest breaks secondary to fatigue. Will continue to follow and progress as tolerated.     Plan/Recommendations:   If medically appropriate, Moderate Intensity Therapy recommended post-acute care. This is recommended as therapy feels the patient would require 3-4 days per week and wouldn't tolerate \"3 hour daily\" rehab intensity. SNF would be the preferred choice. If the patient does not agree to SNF, arrange HH or OP depending on home bound status. If patient is medically complex, consider LTACH. Pt requires no DME at discharge.     Pt desires Skilled Rehab placement at discharge. Pt cooperative; agreeable to therapeutic recommendations and plan of care.     End of session:  Up in chair, chair alarm activated, call light and personal items within reach   PPE: gloves   "

## 2024-05-02 NOTE — SIGNIFICANT NOTE
I spoke to someone in the lab earlier & they said okay to flip to lab as 3 different staff members have already tried to draw labs on this pt unsuccessfully.

## 2024-05-02 NOTE — CASE MANAGEMENT/SOCIAL WORK
Continued Stay Note  [facility]     Patient Name: Constanza Mccall  MRN: [id number]  Today's Date: 5/2/2024    Admit Date: 4/30/2024    Plan: Plan to dc to [facility], accepted. Precert required, started by facility 5/2, pending. PASRR approved.   Discharge Plan       Row Name 05/02/24 1425       Plan    Plan Plan to dc to [facility], accepted. Precert required, started by facility 5/2, pending. PASRR approved.      Row Name 05/02/24 1331       Plan    Plan Plan to dc to [facility], pending acceptance.  Precert required.  PASRR approved.    Plan Comments Agreeable to [facility], referral in [facility], liaison notified, pending acceptance.  DC Barriers:  IV Abxs, HGB 9.1, Ortho following.                 Expected Discharge Date and Time       Expected Discharge Date Expected Discharge Time    May 3, 2024               KRISTOFER Cheatham RN  SIPS/ICU   O: [phone]  C: [phone]  [email or web address]

## 2024-05-02 NOTE — PROGRESS NOTES
Patient is postop day 1 total hip arthroplasty for femoral neck fracture.  From our standpoint she may mobilize weightbearing as tolerated with the use of her prosthetic.  Plan will be physical therapy and likely discharge to rehab.  Follow-up in the office in 3 weeks

## 2024-05-02 NOTE — PLAN OF CARE
Pt presents with functional mobility impairments which indicate the need for skilled intervention. Tolerating session today without incident. Pt mod I for bed mobility and donned LLE prosthesis with mod I in preparation for mobility.  Pt performed bed mobility with mod I and completed transfers with RW with CGA.  Pt ambulated 50' x 2 with RW with CGA with decreased endurance and required 2 standing rest breaks secondary to fatigue. Will continue to follow and progress as tolerated.

## 2024-05-02 NOTE — PLAN OF CARE
Goal Outcome Evaluation:  Plan of Care Reviewed With: patient        Progress: improving  Outcome Evaluation: 50 y/o F admitted to [facility] on 4/30/24 with complaints of L hip pain following a fall earlier in the day when wheel fell off walker, causing her to fall on her hip. XR L Hip (+) for incomplete nondisplaced fracture of the left femoral neck and she is now s/p L aTHA with Dr. Solis on 5/1/24. 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 Per orders in the chart, pt is to be WBAT on the LLE. She is currently living with aunt and several other family members in [facility] with no steps to enter. At baseline, she is normally IND with household mobility and ADLs with no AD. Owns LLE prosthesis that she dons IND and wears throughout the day. No AD needed for household mobility, but uses rollator for community ambulation. No home O2 and does not drive. This date, PT in bed on arrival reporting pain at 8/10. RN admininstered meds. Pt agreable to eval. Mod I for bed mobility, and with setup patient able to don LLE prosthesis with no physical assistance needed from seated position. She did require excess time. She then came to standing and ambulated in darby with RW and CGA. She requires support at all times, and anticipate the need for lower body self-care assistance. OT feels she is unsafe to return to previous living enironment, and recommends SNF at [place].      Anticipated Discharge Disposition (OT): skilled nursing facility

## 2024-05-02 NOTE — THERAPY EVALUATION
Patient Name: Constanza Mccall  : 1975    MRN: [id number]                              Today's Date: 2024       Admit Date: 2024    Visit Dx:     ICD-10-CM ICD-9-CM   1. Closed fracture of neck of left femur, initial encounter  S72.002A 820.8   2. Left hip pain  M25.552 719.45   3. Fall, initial encounter  W19.XXXA E888.9     Patient Active Problem List   Diagnosis    Type 2 diabetes mellitus with complication, without long-term current use of insulin    Peripheral vascular disease    CAD (coronary artery disease)    Ulcer of toe    Superficial femoral artery occlusion    Neuropathy    Family history of diabetes mellitus    Hypertension    Hyperlipidemia    Moderate episode of recurrent major depressive disorder    Closed fracture of neck of left femur    Hip fracture     Past Medical History:   Diagnosis Date    Coronary artery disease     Diabetes mellitus     History of echocardiogram 2018    EF 60% Normal LV diastolic filling parameters. There is normal LV wall thickness. Normla trileaflet aortic valve. Normal mitral valve. Normal LV/RV fucntion with no significant valvulopathy. Normla right anf left pressures estimated.     Hyperlipidemia 2023    Hypertension 2023    Neuropathy     Numbness and tingling sensation of skin 2017    Peripheral artery disease     Rash of hands 2017    Type 2 diabetes mellitus      Past Surgical History:   Procedure Laterality Date    AMPUTATION DIGIT Left 6/3/2022    Procedure: INCISION AND DRAINAGE WITH AMPUTATION OF LEFT 3RD TOE;  Surgeon: Deonte Heath Jr., DPM;  Location: [facility] OR;  Service: Podiatry;  Laterality: Left;    CARDIAC CATHETERIZATION N/A 10/15/2019    Procedure: Left Heart Cath;  Surgeon: Merrick Sarmiento MD;  Location: [facility] CATH INVASIVE LOCATION;  Service: Cardiology    CHOLECYSTECTOMY      OOPHORECTOMY        General Information       Row Name 24 1439          OT Time and Intention     Document Type evaluation  -     Mode of Treatment occupational therapy  -       Row Name 05/02/24 1439          General Information    Patient Profile Reviewed yes  -LS     Prior Level of Function independent:;ADL's;all household mobility  -     Existing Precautions/Restrictions other (see comments)  LLE posthesis and WBAT  -     Barriers to Rehab medically complex;previous functional deficit  -       Row Name 05/02/24 1439          Living Environment    People in Home other (see comments)  LIves in her aunts home with several family members  -       Row Name 05/02/24 1439          Home Main Entrance    Number of Stairs, Main Entrance none  -LS       Row Name 05/02/24 1439          Stairs Within Home, Primary    Number of Stairs, Within Home, Primary none  -LS     Stair Railings, Within Home, Primary none  -LS       Row Name 05/02/24 1439          Cognition    Orientation Status (Cognition) oriented x 4  -       Row Name 05/02/24 1439          Safety Issues, Functional Mobility    Impairments Affecting Function (Mobility) endurance/activity tolerance;pain;strength;balance  -               User Key  (r) = Recorded By, (t) = Taken By, (c) = Cosigned By      Initials Name Provider Type    LS Shaun Novoa OT Occupational Therapist                     Mobility/ADL's       Row Name 05/02/24 1441          Bed Mobility    Bed Mobility bed mobility (all) activities  -     All Activities, [place] (Bed Mobility) modified independence  -     Assistive Device (Bed Mobility) bed rails  -       Row Name 05/02/24 1441          Transfers    Transfers sit-stand transfer  -       Row Name 05/02/24 1441          Sit-Stand Transfer    Sit-Stand [place] (Transfers) contact guard;1 person assist  -     Assistive Device (Sit-Stand Transfers) walker, front-wheeled  -       Row Name 05/02/24 1441          Functional Mobility    Functional Mobility- Ind. Level contact guard assist  -     Functional  Mobility- Device walker, front-wheeled  -LS     Functional Mobility- Comment LLE prosthesis  -LS     Patient was able to Ambulate yes  -       Row Name 05/02/24 1441          Activities of Daily Living    BADL Assessment/Intervention lower body dressing  -       Row Name 05/02/24 1441          Mobility    Extremity Weight-bearing Status left lower extremity  -LS     Left Lower Extremity (Weight-bearing Status) weight-bearing as tolerated (WBAT)  -       Row Name 05/02/24 1441          Lower Body Dressing Assessment/Training    [place] Level (Lower Body Dressing) lower body dressing skills;contact guard assist  -LS     Position (Lower Body Dressing) edge of bed sitting  -               User Key  (r) = Recorded By, (t) = Taken By, (c) = Cosigned By      Initials Name Provider Type    Shaun Ferris OT Occupational Therapist                   Obj/Interventions       Row Name 05/02/24 1442          Sensory Assessment (Somatosensory)    Sensory Assessment (Somatosensory) sensation intact  -       Row Name 05/02/24 1442          Range of Motion Comprehensive    General Range of Motion no range of motion deficits identified  -       Row Name 05/02/24 1442          Strength Comprehensive (MMT)    Comment, General Manual Muscle Testing (MMT) Assessment BUEs grossly 4/5  -       Row Name 05/02/24 1442          Balance    Balance Assessment sitting static balance;sitting dynamic balance;standing static balance;standing dynamic balance  -LS     Static Sitting Balance independent  -LS     Dynamic Sitting Balance independent  -LS     Position, Sitting Balance unsupported;sitting edge of bed  -LS     Static Standing Balance contact guard  -LS     Dynamic Standing Balance contact guard  -LS     Position/Device Used, Standing Balance supported;walker, front-wheeled  -LS               User Key  (r) = Recorded By, (t) = Taken By, (c) = Cosigned By      Initials Name Provider Type    Shaun Ferris OT  Occupational Therapist                   Goals/Plan       Row Name 05/02/24 1447          Bed Mobility Goal 1 (OT)    Activity/Assistive Device (Bed Mobility Goal 1, OT) bed mobility activities, all  -LS     [place] Level/Cues Needed (Bed Mobility Goal 1, OT) modified independence  -LS     Time Frame (Bed Mobility Goal 1, OT) long term goal (LTG);2 weeks  -       Row Name 05/02/24 1447          Transfer Goal 1 (OT)    Activity/Assistive Device (Transfer Goal 1, OT) transfers, all  -LS     [place] Level/Cues Needed (Transfer Goal 1, OT) modified independence  -LS     Time Frame (Transfer Goal 1, OT) long term goal (LTG);2 weeks  -       Row Name 05/02/24 1447          Dressing Goal 1 (OT)    Activity/Device (Dressing Goal 1, OT) dressing skills, all  -LS     [place]/Cues Needed (Dressing Goal 1, OT) modified independence  -LS     Time Frame (Dressing Goal 1, OT) long term goal (LTG);2 weeks  -       Row Name 05/02/24 1447          Toileting Goal 1 (OT)    Activity/Device (Toileting Goal 1, OT) toileting skills, all  -LS     [place] Level/Cues Needed (Toileting Goal 1, OT) modified independence  -LS     Time Frame (Toileting Goal 1, OT) long term goal (LTG);2 weeks  -       Row Name 05/02/24 1447          Therapy Assessment/Plan (OT)    Planned Therapy Interventions (OT) activity tolerance training;BADL retraining;functional balance retraining;IADL retraining;occupation/activity based interventions;ROM/therapeutic exercise;strengthening exercise;transfer/mobility retraining;patient/caregiver education/training  -               User Key  (r) = Recorded By, (t) = Taken By, (c) = Cosigned By      Initials Name Provider Type    Shaun Ferris OT Occupational Therapist                   Clinical Impression       Row Name 05/02/24 1442          Pain Assessment    Pretreatment Pain Rating 8/10  -LS     Posttreatment Pain Rating 8/10  -LS     Pain Location - Side/Orientation Left  -LS     Pain  Location - hip  -LS     Pain Intervention(s) Medication (See MAR);Repositioned;Ambulation/increased activity  -       Row Name 05/02/24 1442          Plan of Care Review    Plan of Care Reviewed With patient  -     Progress improving  -     Outcome Evaluation 50 y/o F admitted to [facility] on 4/30/24 with complaints of L hip pain following a fall earlier in the day when wheel fell off walker, causing her to fall on her hip. XR L Hip (+) for incomplete nondisplaced fracture of the left femoral neck and she is now s/p L aTHA with Dr. Solis on 5/1/24. 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 Per orders in the chart, pt is to be WBAT on the LLE. She is currently living with aunt and several other family members in [facility] with no steps to enter. At baseline, she is normally IND with household mobility and ADLs with no AD. Owns LLE prosthesis that she dons IND and wears throughout the day. No AD needed for household mobility, but uses rollator for community ambulation. No home O2 and does not drive. This date, PT in bed on arrival reporting pain at 8/10. RN admininstered meds. Pt agreable to eval. Mod I for bed mobility, and with setup patient able to don LLE prosthesis with no physical assistance needed from seated position. She did require excess time. She then came to standing and ambulated in darby with RW and CGA. She requires support at all times, and anticipate the need for lower body self-care assistance. OT feels she is unsafe to return to previous living enironment, and [place] SNF [place].  -       Row Name 05/02/24 1442          Therapy Assessment/Plan (OT)    Rehab Potential (OT) good, to achieve stated therapy goals  -     Criteria for Skilled Therapeutic Interventions Met (OT) yes;skilled treatment is necessary  -     Therapy Frequency (OT) 5 times/wk  -     Predicted Duration of Therapy Intervention (OT) until dc  -       Row Name 05/02/24 1442          Therapy Plan Review/Discharge Plan (OT)     Anticipated Discharge Disposition (OT) [facility]  -       Row Name 05/02/24 1442          Vital Signs    O2 Delivery Pre Treatment room air  -LS     O2 Delivery Intra Treatment room air  -LS     O2 Delivery Post Treatment room air  -LS     Pre Patient Position Supine  -LS     Intra Patient Position Standing  -LS     Post Patient Position Sitting  -LS       Row Name 05/02/24 1442          Positioning and Restraints    Pre-Treatment Position in bed  -LS     Post Treatment Position chair  -LS     In Chair notified nsg;reclined;call light within reach;encouraged to call for assist;exit alarm on  -LS               User Key  (r) = Recorded By, (t) = Taken By, (c) = Cosigned By      Initials Name Provider Type    Shaun Ferris OT Occupational Therapist                   Outcome Measures       Row Name 05/02/24 1447          How much help from another is currently needed...    Putting on and taking off regular lower body clothing? 3  -LS     Bathing (including washing, rinsing, and drying) 3  -LS     Toileting (which includes using toilet bed pan or urinal) 3  -LS     Putting on and taking off regular upper body clothing 3  -LS     Taking care of personal grooming (such as brushing teeth) 4  -LS     Eating meals 4  -LS     AM-PAC 6 Clicks Score (OT) 20  -LS       Row Name 05/02/24 0800          How much help from another person do you currently need...    Turning from your back to your side while in flat bed without using bedrails? 4  -CC     Moving from lying on back to sitting on the side of a flat bed without bedrails? 3  -CC     Moving to and from a bed to a chair (including a wheelchair)? 3  -CC     Standing up from a chair using your arms (e.g., wheelchair, bedside chair)? 3  -CC     Climbing 3-5 steps with a railing? 2  -CC     To walk in hospital room? 3  -CC     AM-PAC 6 Clicks Score (PT) 18  -CC     Highest Level of Mobility Goal 6 --> Walk 10 steps or more  -CC       Row Name 05/02/24 4599           Functional Assessment    Outcome Measure Options AM-PAC 6 Clicks Daily Activity (OT)  -               User Key  (r) = Recorded By, (t) = Taken By, (c) = Cosigned By      Initials Name Provider Type     Shaun Novoa OT Occupational Therapist    Kevin Bowens LPN Licensed Nurse                    Occupational Therapy Education       Title: PT OT SLP Therapies (Done)       Topic: Occupational Therapy (Done)       Point: ADL training (Done)       Description:   Instruct learner(s) on proper safety adaptation and remediation techniques during self care or transfers.   Instruct in proper use of assistive devices.                  Learning Progress Summary             Patient Acceptance, E,TB, VU by  at 5/2/2024 1448                         Point: Precautions (Done)       Description:   Instruct learner(s) on prescribed precautions during self-care and functional transfers.                  Learning Progress Summary             Patient Acceptance, E,TB, VU by  at 5/2/2024 1448                         Point: Body mechanics (Done)       Description:   Instruct learner(s) on proper positioning and spine alignment during self-care, functional mobility activities and/or exercises.                  Learning Progress Summary             Patient Acceptance, E,TB, VU by  at 5/2/2024 1448                                         User Key       Initials Effective Dates Name Provider Type Discipline     09/22/22 -  Shaun Novoa OT Occupational Therapist OT                  OT Recommendation and Plan  Planned Therapy Interventions (OT): activity tolerance training, BADL retraining, functional balance retraining, IADL retraining, occupation/activity based interventions, ROM/therapeutic exercise, strengthening exercise, transfer/mobility retraining, patient/caregiver education/training  Therapy Frequency (OT): 5 times/wk  Plan of Care Review  Plan of Care Reviewed With: patient  Progress: improving  Outcome  Evaluation: 50 y/o F admitted to [facility] on 4/30/24 with complaints of L hip pain following a fall earlier in the day when wheel fell off walker, causing her to fall on her hip. XR L Hip (+) for incomplete nondisplaced fracture of the left femoral neck and she is now s/p L aTHA with Dr. Solis on 5/1/24. 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 Per orders in the chart, pt is to be WBAT on the LLE. She is currently living with aunt and several other family members in [facility] with no steps to enter. At baseline, she is normally IND with household mobility and ADLs with no AD. Owns LLE prosthesis that she dons IND and wears throughout the day. No AD needed for household mobility, but uses rollator for community ambulation. No home O2 and does not drive. This date, PT in bed on arrival reporting pain at 8/10. RN admininstered meds. Pt agreable to eval. Mod I for bed mobility, and with setup patient able to don LLE prosthesis with no physical assistance needed from seated position. She did require excess time. She then came to standing and ambulated in darby with RW and CGA. She requires support at all times, and anticipate the need for lower body self-care assistance. OT feels she is unsafe to return to previous living enironment, and recommends SNF at [place].     Time Calculation:         Time Calculation- OT       Row Name 05/02/24 1448             Time Calculation- OT    OT Start Time 0901  -LS      OT Stop Time 0927  -      OT Time Calculation (min) 26 min  -LS      OT Received On 05/02/24  -      OT - Next Appointment 05/03/24  -      OT Goal Re-Cert Due Date 05/16/24  -         Untimed Charges    OT Eval/Re-eval Minutes 26  -LS         Total Minutes    Untimed Charges Total Minutes 26  -LS       Total Minutes 26  -LS                User Key  (r) = Recorded By, (t) = Taken By, (c) = Cosigned By      Initials Name Provider Type    Shaun Ferris OT Occupational Therapist                  Therapy Charges for Today        Code Description Service Date Service Provider Modifiers Qty    47672469059 HC OT EVAL MOD COMPLEXITY 4 5/2/2024 Shaun Novoa OT GO 1                 Shaun Novoa OT  5/2/2024

## 2024-05-02 NOTE — DISCHARGE PLACEMENT REQUEST
"Ren Mccall (49 y.o. Female)       Date of Birth   1975    Social Security Number       Address   [address] [place] [address] [place] [facility]    Home Phone   [phone]    MRN   [id number]       Shinto   None    Marital Status                               Admission Date   4/30/24    Admission Type   Emergency    Admitting Provider   Lamonte Cummings MD    Attending Provider   Heladio Givens MD    Department, Room/Bed   [facility] SURGICAL INPATIENT, 4128/1       Discharge Date       Discharge Disposition       Discharge Destination                                 Attending Provider: Heladio Givens MD    Allergies: No Known Allergies    Isolation: None   Infection: None   Code Status: Prior    Ht: 175.3 cm (69.02\")   Wt: 84.8 kg (186 lb 15.2 oz)    Admission Cmt: None   Principal Problem: Closed fracture of neck of left femur [S72.002A]                   Active Insurance as of 4/30/2024       Primary Coverage       Payor Plan Insurance Group Employer/Plan Group    [employer] [id number]        Payor Plan Address Payor Plan Phone Number Payor Plan Fax Number Effective Dates    [address]   4/1/2024 - None Entered    [facility] [id number]         Subscriber Name Subscriber Birth Date Member ID       REN MCCALL 1975 [id number]                     Emergency Contacts        (Rel.) Home Phone Work Phone Mobile Phone    Astrid mahoney (Sister) [phone] -- --    Padmini Souza -- -- [phone]                "

## 2024-05-03 VITALS
RESPIRATION RATE: 20 BRPM | HEIGHT: 69 IN | SYSTOLIC BLOOD PRESSURE: 103 MMHG | BODY MASS INDEX: 27.69 KG/M2 | HEART RATE: 71 BPM | OXYGEN SATURATION: 97 % | TEMPERATURE: 98.7 F | DIASTOLIC BLOOD PRESSURE: 50 MMHG | WEIGHT: 186.95 LBS

## 2024-05-03 LAB
ANION GAP SERPL CALCULATED.3IONS-SCNC: 7 MMOL/L (ref 5–15)
BASOPHILS # BLD AUTO: 0.03 10*3/MM3 (ref 0–0.2)
BASOPHILS NFR BLD AUTO: 0.4 % (ref 0–1.5)
BUN SERPL-MCNC: 13 MG/DL (ref 6–20)
BUN/CREAT SERPL: 16.7 (ref 7–25)
CALCIUM SPEC-SCNC: 9 MG/DL (ref 8.6–10.5)
CHLORIDE SERPL-SCNC: 101 MMOL/L (ref 98–107)
CO2 SERPL-SCNC: 29 MMOL/L (ref 22–29)
CREAT SERPL-MCNC: 0.78 MG/DL (ref 0.57–1)
DEPRECATED RDW RBC AUTO: 43.8 FL (ref 37–54)
EGFRCR SERPLBLD CKD-EPI 2021: 93.2 ML/MIN/1.73
EOSINOPHIL # BLD AUTO: 0.21 10*3/MM3 (ref 0–0.4)
EOSINOPHIL NFR BLD AUTO: 3 % (ref 0.3–6.2)
ERYTHROCYTE [DISTWIDTH] IN BLOOD BY AUTOMATED COUNT: 13.2 % (ref 12.3–15.4)
GLUCOSE BLDC GLUCOMTR-MCNC: 131 MG/DL (ref 70–105)
GLUCOSE BLDC GLUCOMTR-MCNC: 245 MG/DL (ref 70–105)
GLUCOSE SERPL-MCNC: 139 MG/DL (ref 65–99)
HCT VFR BLD AUTO: 28.4 % (ref 34–46.6)
HGB BLD-MCNC: 9.1 G/DL (ref 12–15.9)
IMM GRANULOCYTES # BLD AUTO: 0.02 10*3/MM3 (ref 0–0.05)
IMM GRANULOCYTES NFR BLD AUTO: 0.3 % (ref 0–0.5)
LYMPHOCYTES # BLD AUTO: 2.48 10*3/MM3 (ref 0.7–3.1)
LYMPHOCYTES NFR BLD AUTO: 35.8 % (ref 19.6–45.3)
MAGNESIUM SERPL-MCNC: 2 MG/DL (ref 1.6–2.6)
MCH RBC QN AUTO: 29.4 PG (ref 26.6–33)
MCHC RBC AUTO-ENTMCNC: 32 G/DL (ref 31.5–35.7)
MCV RBC AUTO: 91.6 FL (ref 79–97)
MONOCYTES # BLD AUTO: 0.67 10*3/MM3 (ref 0.1–0.9)
MONOCYTES NFR BLD AUTO: 9.7 % (ref 5–12)
NEUTROPHILS NFR BLD AUTO: 3.51 10*3/MM3 (ref 1.7–7)
NEUTROPHILS NFR BLD AUTO: 50.8 % (ref 42.7–76)
NRBC BLD AUTO-RTO: 0 /100 WBC (ref 0–0.2)
PLATELET # BLD AUTO: 161 10*3/MM3 (ref 140–450)
PMV BLD AUTO: 10.4 FL (ref 6–12)
POTASSIUM SERPL-SCNC: 4.3 MMOL/L (ref 3.5–5.2)
RBC # BLD AUTO: 3.1 10*6/MM3 (ref 3.77–5.28)
SODIUM SERPL-SCNC: 137 MMOL/L (ref 136–145)
WBC NRBC COR # BLD AUTO: 6.92 10*3/MM3 (ref 3.4–10.8)

## 2024-05-03 PROCEDURE — 82948 REAGENT STRIP/BLOOD GLUCOSE: CPT | Performed by: ORTHOPAEDIC SURGERY

## 2024-05-03 PROCEDURE — 85025 COMPLETE CBC W/AUTO DIFF WBC: CPT | Performed by: HOSPITALIST

## 2024-05-03 PROCEDURE — 63710000001 INSULIN LISPRO (HUMAN) PER 5 UNITS: Performed by: ORTHOPAEDIC SURGERY

## 2024-05-03 PROCEDURE — 83735 ASSAY OF MAGNESIUM: CPT | Performed by: ORTHOPAEDIC SURGERY

## 2024-05-03 PROCEDURE — 80048 BASIC METABOLIC PNL TOTAL CA: CPT | Performed by: HOSPITALIST

## 2024-05-03 RX ORDER — POLYETHYLENE GLYCOL 3350 17 G/17G
17 POWDER, FOR SOLUTION ORAL DAILY
Start: 2024-05-03 | End: 2024-05-10

## 2024-05-03 RX ORDER — OXYCODONE HYDROCHLORIDE 10 MG/1
10 TABLET ORAL EVERY 6 HOURS PRN
Qty: 12 TABLET | Refills: 0 | Status: SHIPPED | OUTPATIENT
Start: 2024-05-03 | End: 2024-05-06

## 2024-05-03 RX ADMIN — INSULIN LISPRO 3 UNITS: 100 INJECTION, SOLUTION INTRAVENOUS; SUBCUTANEOUS at 12:14

## 2024-05-03 RX ADMIN — CITALOPRAM HYDROBROMIDE 20 MG: 20 TABLET ORAL at 08:49

## 2024-05-03 RX ADMIN — RIVAROXABAN 2.5 MG: 2.5 TABLET, FILM COATED ORAL at 08:49

## 2024-05-03 RX ADMIN — OXYCODONE 10 MG: 5 TABLET ORAL at 12:17

## 2024-05-03 RX ADMIN — Medication 10 ML: at 08:49

## 2024-05-03 NOTE — DISCHARGE SUMMARY
[facility] Medicine Services  Discharge Summary    Date of Service: 5/3/24  Patient Name: Constanza Mccall  : 1975  MRN: [id number]    Date of Admission: 2024  Discharge Diagnosis: #Mechanical fall complicated by left femoral neck fracture, acute blood loss anemia  Date of Discharge:  5/3/24  Primary Care Physician: Shweta Osorio APRN      Presenting Problem:   Hip fracture [S72.009A]  Left hip pain [M25.552]  Fall, initial encounter [W19.XXXA]  Closed fracture of neck of left femur, initial encounter [S72.002A]    Active and Resolved Hospital Problems:  Active Hospital Problems    Diagnosis POA    **Closed fracture of neck of left femur [S72.002A] Unknown    Hip fracture [S72.009A] Yes    Hypertension [I10] Yes    Hyperlipidemia [E78.5] Yes    CAD (coronary artery disease) [I25.10] Yes    Peripheral vascular disease [I73.9] Yes    Type 2 diabetes mellitus with complication, without long-term current use of insulin [E11.8] Yes    Neuropathy [G62.9] Yes      Resolved Hospital Problems   No resolved problems to display.         Hospital Course     HPI:  Admitting team HPI   Constanza Mccall is a 49 y.o. white female with multiple medical problems, past history significant for CAD, diabetes, hypertension, hyperlipidemia, peripheral arterial disease.  who presented to [facility] on 2024 with complaints of left hip pain after mechanical fall today.  Patient states that she was trying to walk through grass with her walker, she has a left BKA, she states that the wheel fell off of her walker causing her to fall directly onto her left hip.  She did not hit her head.  She is complaining of pain only to the left hip.  No back pain or knee pain.  She did not fall on her stump.  She does take blood thinners but denies hitting her head or LOC.  She rates her pain a 10/10.     Hospital Course:  #Mechanical fall complicated by left femoral neck fracture  Seen by orthopedics status post  total hip arthroplasty 5/1  WBAT as tolerated  PT/OT> rehab  Continue Xarelto for DVT prophylaxis  Pain control with oxycodone as needed.   Continue bowel regimen  Outpatient follow-up with orthopedics in 3 weeks     #Hypertension  BP soft continue to hold lisinopril this may be resumed as outpatient as blood pressure permits     #Hyperlipidemia #DM2  continue statins      Type 2 diabetes   Continue home regimen     #Acute blood loss anemia  Hemoglobin on admission 11.4 dropped to 9.1 repeat stable       #Depression chronic-stable  continue Celexa     #PAD  Continue Xarelto  Resume Plavix and DC  Continue statins        DISCHARGE Follow Up Recommendations for labs and diagnostics: Outpatient follow-up with orthopedics in 3 weeks      Reasons For Change In Medications and Indications for New Medications:      Day of Discharge     Vital Signs:  Temp:  [98.7 °F (37.1 °C)-99.7 °F (37.6 °C)] 98.7 °F (37.1 °C)  Heart Rate:  [71-74] 71  Resp:  [20] 20  BP: (103-122)/(50-60) 103/50    Physical Exam:  Physical Exam AOx3 NAD  RRR S1-S2 audible  Lungs CTA  Abdomen soft nontender nondistended      Pertinent  and/or Most Recent Results     LAB RESULTS:      Lab 05/03/24  0044 05/02/24  1054 04/30/24  1733 04/30/24  1653   WBC 6.92 5.98  --  11.29*   HEMOGLOBIN 9.1* 9.1*  --  14.2   HEMATOCRIT 28.4* 29.3*  --  43.3   PLATELETS 161 153  --  203   NEUTROS ABS 3.51 3.66  --  7.61*   IMMATURE GRANS (ABS) 0.02 0.02  --  0.12*   LYMPHS ABS 2.48 1.68  --  2.66   MONOS ABS 0.67 0.52  --  0.64   EOS ABS 0.21 0.08  --  0.23   MCV 91.6 96.7  --  90.6   PROCALCITONIN  --   --   --  0.02   PROTIME  --   --  11.1  --    APTT  --   --  39.0*  --          Lab 05/03/24  0044 05/02/24  1054 05/01/24  1453 05/01/24  0802 04/30/24  1653   SODIUM 137  --  137  --  139   POTASSIUM 4.3  --  4.3  --  3.9   CHLORIDE 101  --  103  --  103   CO2 29.0  --  22.0  --  24.0   ANION GAP 7.0  --  12.0  --  12.0   BUN 13  --  12  --  12   CREATININE 0.78  --   0.71  --  0.71   EGFR 93.2  --  104.4  --  104.4   GLUCOSE 139*  --  183*  --  97   CALCIUM 9.0  --  9.0  --  10.2   IONIZED CALCIUM  --   --   --  1.18*  --    MAGNESIUM 2.0 1.8 1.8  --   --    HEMOGLOBIN A1C  --   --   --  6.20*  --    TSH  --   --   --  1.960  --          Lab 05/01/24  1453   TOTAL PROTEIN 6.4   ALBUMIN 3.9   GLOBULIN 2.5   ALT (SGPT) 18   AST (SGOT) 18   BILIRUBIN 0.3   ALK PHOS 74         Lab 05/01/24  1714 05/01/24  1453 04/30/24  1733 04/30/24  1653   PROBNP  --   --   --  54.5   HSTROP T 36* 42*  --   --    PROTIME  --   --  11.1  --    INR  --   --  1.02  --          Lab 05/01/24  1453   CHOLESTEROL 107   LDL CHOL 57   HDL CHOL 30*   TRIGLYCERIDES 104         Lab 04/30/24  1733   ABO TYPING A   RH TYPING Positive   ANTIBODY SCREEN Negative         Brief Urine Lab Results  (Last result in the past 365 days)        Color   Clarity   Blood   Leuk Est   Nitrite   Protein   CREAT   Urine HCG        05/02/24 0444 Yellow   Clear   Negative   Negative   Negative   Negative                 Microbiology Results (last 10 days)       Procedure Component Value - Date/Time    COVID-19, FLU A/B, RSV PCR 1 HR TAT - Swab, Nasopharynx [[id number]]  (Normal) Collected: 04/30/24 2142    Lab Status: Final result Specimen: Swab from Nasopharynx Updated: 04/30/24 2225     COVID19 Not Detected     Influenza A PCR Not Detected     Influenza B PCR Not Detected     RSV, PCR Not Detected    Narrative:      Fact sheet for providers: https://www.fda.gov/media/569170/download    Fact sheet for patients: https://www.fda.gov/media/051032/download    Test performed by PCR.            XR Hip With or Without Pelvis 1 View Left    Result Date: 5/1/2024  Impression: Postoperative changes. Electronically Signed: Jim Varela MD  5/1/2024 4:17 PM EDT  Workstation ID: [id number]    XR Chest 1 View    Result Date: 4/30/2024  Impression: Impression: No acute chest finding. Electronically Signed: Christina Wills MD  4/30/2024 4:08 PM EDT   Workstation ID: [id number]    XR Hip With or Without Pelvis 2 - 3 View Left    Result Date: 4/30/2024  Impression: 1. Incomplete nondisplaced fracture of the left femoral neck. No dislocation 2. Moderate degenerative change of the left hip. Electronically Signed: Chrisitna Wills MD  4/30/2024 3:47 PM EDT  Workstation ID: [id number]     Results for orders placed during the hospital encounter of 12/17/21    Doppler Ankle Brachial Index Single Level CAR    Interpretation Summary  · Right Conclusion: The right DEEPA is normal. Normal digital pressures.  · Left Conclusion: The left DEEPA is mildly reduced. Normal digital pressures.      Results for orders placed during the hospital encounter of 12/17/21    Doppler Ankle Brachial Index Single Level CAR    Interpretation Summary  · Right Conclusion: The right DEEPA is normal. Normal digital pressures.  · Left Conclusion: The left DEEPA is mildly reduced. Normal digital pressures.      Results for orders placed during the hospital encounter of 12/15/23    Adult Transthoracic Echo Complete W/ Cont if Necessary Per Protocol    Interpretation Summary    Left ventricular systolic function is normal. Left ventricular ejection fraction appears to be 61 - 65%.    Left ventricular diastolic function is consistent with (grade I) impaired relaxation.      Labs Pending at Discharge:      Procedures Performed  Procedure(s):  TOTAL HIP ARTHROPLASTY ANTERIOR WITH TRACTION PIN PLACEMENT         Consults:   Consults       Date and Time Order Name Status Description    5/1/2024  7:19 AM Inpatient Orthopedic Surgery Consult Completed     4/30/2024  3:57 PM Ortho (on-call MD unless specified)      4/30/2024  3:57 PM Hospitalist (on-call MD unless specified)                Discharge Details        Discharge Medications        Continue These Medications        Instructions Start Date   albuterol sulfate  (90 Base) MCG/ACT inhaler  Commonly known as: PROVENTIL HFA;VENTOLIN HFA;PROAIR HFA   2 puffs,  Inhalation, Every 4 Hours PRN      atorvastatin 40 MG tablet  Commonly known as: LIPITOR   40 mg, Oral, Nightly      citalopram 20 MG tablet  Commonly known as: CeleXA   20 mg, Oral, Daily      clopidogrel 75 MG tablet  Commonly known as: PLAVIX   75 mg, Oral, Daily      empagliflozin 10 MG tablet tablet  Commonly known as: Jardiance   10 mg, Oral, Daily      glucose blood test strip   1 each, Other, 4 Times Daily Before Meals & Nightly      glucose monitor monitoring kit   1 each, Does not apply, 4 Times Daily Before Meals & Nightly      Insulin Glargine 100 UNIT/ML injection pen  Commonly known as: LANTUS SOLOSTAR   20 Units, Subcutaneous, Nightly, Has been out is ordered      Lancets misc   1 each, Does not apply, 4 Times Daily Before Meals & Nightly      lisinopril 10 MG tablet  Commonly known as: PRINIVIL,ZESTRIL   5 mg, Oral, Daily      metFORMIN 1000 MG tablet  Commonly known as: GLUCOPHAGE   1,000 mg, Oral, 2 Times Daily With Meals      Pen Needles 32G X 4 MM misc   1 each, Does not apply, Nightly      Xarelto 2.5 MG tablet  Generic drug: Rivaroxaban   2.5 mg, Oral, 2 Times Daily With Meals               No Known Allergies      Discharge Disposition: Rehab      Diet:  Hospital:  Diet Order   Procedures    Diet: Diabetic; Consistent Carbohydrate; Fluid Consistency: Thin (IDDSI 0)         Discharge Activity:         CODE STATUS:  There are no questions and answers to display.         Future Appointments   Date Time Provider Department Center   5/22/2024 12:40 PM LAB  [facility] None   5/29/2024 12:40 PM Tariq Polanco MD [facility]   9/25/2024  1:45 PM Merrick Sarmiento MD [facility] CAR NA P MG NA           Time spent on Discharge including face to face service:  >30 minutes    Signature: Electronically signed by Heladio Givens MD, 05/03/24, 13:03 EDT.  [facility] Hospitalist Team

## 2024-05-03 NOTE — PAYOR COMM NOTE
"This is discharge notification for Ren Mccall   Reference/Auth # [id number]   Pt discharged to skilled nsg facility on 5/3/4 .    FARAZ Sarkar, RN, [facility]  Utilization Review Nurse  [facility]  Direct & confidential phone # [phone]  Fax # [phone]        Ren Mccall (49 y.o. Female)       Date of Birth   1975    Social Security Number       Address   [address] [place] [address] [place] [address]    Home Phone   [phone]    MRN   [id number]       Hoahaoism   None    Marital Status                               Admission Date   4/30/24    Admission Type   Emergency    Admitting Provider   Lamonte Cummings MD    Attending Provider       Department, Room/Bed   [facility], 4128/1       Discharge Date   5/3/2024    Discharge Disposition   Skilled Nursing Facility (DC - External)    Discharge Destination                                 Attending Provider: (none)   Allergies: No Known Allergies    Isolation: None   Infection: None   Code Status: Prior    Ht: 175.3 cm (69.02\")   Wt: 84.8 kg (186 lb 15.2 oz)    Admission Cmt: None   Principal Problem: Closed fracture of neck of left femur [S72.002A]                   Active Insurance as of 4/30/2024       Primary Coverage       Payor Plan Insurance Group Employer/Plan Group    [facility] -[place] [employer] - [id number]        Payor Plan Address Payor Plan Phone Number Payor Plan Fax Number Effective Dates    [address]   4/1/2024 - None Entered    [facility] [id number]         Subscriber Name Subscriber Birth Date Member ID       REN MCCALL 1975 [id number]                     Emergency Contacts        (Rel.) Home Phone Work Phone Mobile Phone    Astrid mahoney (Sister) [phone] -- --    Padmini Souza -- -- [phone]                 Discharge Summary        Heladio Givens MD at 05/03/24 [address] [place] [address] " Services  Discharge Summary    Date of Service: 5/3/24  Patient Name: Constanza Mccall  : 1975  MRN: [id number]    Date of Admission: 2024  Discharge Diagnosis: #Mechanical fall complicated by left femoral neck fracture, acute blood loss anemia  Date of Discharge:  5/3/24  Primary Care Physician: Shweta Osorio APRN      Presenting Problem:   Hip fracture [S72.009A]  Left hip pain [M25.552]  Fall, initial encounter [W19.XXXA]  Closed fracture of neck of left femur, initial encounter [S72.002A]    Active and Resolved Hospital Problems:  Active Hospital Problems    Diagnosis POA    **Closed fracture of neck of left femur [S72.002A] Unknown    Hip fracture [S72.009A] Yes    Hypertension [I10] Yes    Hyperlipidemia [E78.5] Yes    CAD (coronary artery disease) [I25.10] Yes    Peripheral vascular disease [I73.9] Yes    Type 2 diabetes mellitus with complication, without long-term current use of insulin [E11.8] Yes    Neuropathy [G62.9] Yes      Resolved Hospital Problems   No resolved problems to display.         Hospital Course     HPI:  Admitting team HPI   Constanza Mccall is a 49 y.o. white female with multiple medical problems, past history significant for CAD, diabetes, hypertension, hyperlipidemia, peripheral arterial disease.  who presented to [facility] on 2024 with complaints of left hip pain after mechanical fall today.  Patient states that she was trying to walk through grass with her walker, she has a left BKA, she states that the wheel fell off of her walker causing her to fall directly onto her left hip.  She did not hit her head.  She is complaining of pain only to the left hip.  No back pain or knee pain.  She did not fall on her stump.  She does take blood thinners but denies hitting her head or LOC.  She rates her pain a 10/10.     Hospital Course:  #Mechanical fall complicated by left femoral neck fracture  Seen by orthopedics status post total hip arthroplasty   WBAT as  tolerated  PT/OT> rehab  Continue Xarelto for DVT prophylaxis  Pain control with oxycodone as needed.   Continue bowel regimen  Outpatient follow-up with orthopedics in 3 weeks     #Hypertension  BP soft continue to hold lisinopril this may be resumed as outpatient as blood pressure permits     #Hyperlipidemia #DM2  continue statins      Type 2 diabetes   Continue home regimen     #Acute blood loss anemia  Hemoglobin on admission 11.4 dropped to 9.1 repeat stable       #Depression chronic-stable  continue Celexa     #PAD  Continue Xarelto  Resume Plavix and DC  Continue statins        DISCHARGE Follow Up Recommendations for labs and diagnostics: Outpatient follow-up with orthopedics in 3 weeks      Reasons For Change In Medications and Indications for New Medications:      Day of Discharge     Vital Signs:  Temp:  [98.7 °F (37.1 °C)-99.7 °F (37.6 °C)] 98.7 °F (37.1 °C)  Heart Rate:  [71-74] 71  Resp:  [20] 20  BP: (103-122)/(50-60) 103/50    Physical Exam:  Physical Exam AOx3 NAD  RRR S1-S2 audible  Lungs CTA  Abdomen soft nontender nondistended      Pertinent  and/or Most Recent Results     LAB RESULTS:      Lab 05/03/24  0044 05/02/24  1054 04/30/24  1733 04/30/24  1653   WBC 6.92 5.98  --  11.29*   HEMOGLOBIN 9.1* 9.1*  --  14.2   HEMATOCRIT 28.4* 29.3*  --  43.3   PLATELETS 161 153  --  203   NEUTROS ABS 3.51 3.66  --  7.61*   IMMATURE GRANS (ABS) 0.02 0.02  --  0.12*   LYMPHS ABS 2.48 1.68  --  2.66   MONOS ABS 0.67 0.52  --  0.64   EOS ABS 0.21 0.08  --  0.23   MCV 91.6 96.7  --  90.6   PROCALCITONIN  --   --   --  0.02   PROTIME  --   --  11.1  --    APTT  --   --  39.0*  --          Lab 05/03/24  0044 05/02/24  1054 05/01/24  1453 05/01/24  0802 04/30/24  1653   SODIUM 137  --  137  --  139   POTASSIUM 4.3  --  4.3  --  3.9   CHLORIDE 101  --  103  --  103   CO2 29.0  --  22.0  --  24.0   ANION GAP 7.0  --  12.0  --  12.0   BUN 13  --  12  --  12   CREATININE 0.78  --  0.71  --  0.71   EGFR 93.2  --  104.4   --  104.4   GLUCOSE 139*  --  183*  --  97   CALCIUM 9.0  --  9.0  --  10.2   IONIZED CALCIUM  --   --   --  1.18*  --    MAGNESIUM 2.0 1.8 1.8  --   --    HEMOGLOBIN A1C  --   --   --  6.20*  --    TSH  --   --   --  1.960  --          Lab 05/01/24  1453   TOTAL PROTEIN 6.4   ALBUMIN 3.9   GLOBULIN 2.5   ALT (SGPT) 18   AST (SGOT) 18   BILIRUBIN 0.3   ALK PHOS 74         Lab 05/01/24  1714 05/01/24  1453 04/30/24  1733 04/30/24  1653   PROBNP  --   --   --  54.5   HSTROP T 36* 42*  --   --    PROTIME  --   --  11.1  --    INR  --   --  1.02  --          Lab 05/01/24  1453   CHOLESTEROL 107   LDL CHOL 57   HDL CHOL 30*   TRIGLYCERIDES 104         Lab 04/30/24  1733   ABO TYPING A   RH TYPING Positive   ANTIBODY SCREEN Negative         Brief Urine Lab Results  (Last result in the past 365 days)        Color   Clarity   Blood   Leuk Est   Nitrite   Protein   CREAT   Urine HCG        05/02/24 0444 Yellow   Clear   Negative   Negative   Negative   Negative                 Microbiology Results (last 10 days)       Procedure Component Value - Date/Time    COVID-19, FLU A/B, RSV PCR 1 HR TAT - Swab, Nasopharynx [[id number]]  (Normal) Collected: 04/30/24 2142    Lab Status: Final result Specimen: Swab from Nasopharynx Updated: 04/30/24 2225     COVID19 Not Detected     Influenza A PCR Not Detected     Influenza B PCR Not Detected     RSV, PCR Not Detected    Narrative:      Fact sheet for providers: https://www.fda.gov/media/084907/download    Fact sheet for patients: https://www.fda.gov/media/117437/download    Test performed by PCR.            XR Hip With or Without Pelvis 1 View Left    Result Date: 5/1/2024  Impression: Postoperative changes. Electronically Signed: Jim Varela MD  5/1/2024 4:17 PM EDT  Workstation ID: [id number]    XR Chest 1 View    Result Date: 4/30/2024  Impression: Impression: No acute chest finding. Electronically Signed: Christina Wills MD  4/30/2024 4:08 PM EDT  Workstation ID: [id number]    XR Hip  With or Without Pelvis 2 - 3 View Left    Result Date: 4/30/2024  Impression: 1. Incomplete nondisplaced fracture of the left femoral neck. No dislocation 2. Moderate degenerative change of the left hip. Electronically Signed: Christina Wills MD  4/30/2024 3:47 PM EDT  Workstation ID: [id number]     Results for orders placed during the hospital encounter of 12/17/21    Doppler Ankle Brachial Index Single Level CAR    Interpretation Summary  · Right Conclusion: The right DEEPA is normal. Normal digital pressures.  · Left Conclusion: The left DEEPA is mildly reduced. Normal digital pressures.      Results for orders placed during the hospital encounter of 12/17/21    Doppler Ankle Brachial Index Single Level CAR    Interpretation Summary  · Right Conclusion: The right DEEPA is normal. Normal digital pressures.  · Left Conclusion: The left DEEPA is mildly reduced. Normal digital pressures.      Results for orders placed during the hospital encounter of 12/15/23    Adult Transthoracic Echo Complete W/ Cont if Necessary Per Protocol    Interpretation Summary    Left ventricular systolic function is normal. Left ventricular ejection fraction appears to be 61 - 65%.    Left ventricular diastolic function is consistent with (grade I) impaired relaxation.      Labs Pending at Discharge:      Procedures Performed  Procedure(s):  TOTAL HIP ARTHROPLASTY ANTERIOR WITH TRACTION PIN PLACEMENT         Consults:   Consults       Date and Time Order Name Status Description    5/1/2024  7:19 AM Inpatient Orthopedic Surgery Consult Completed     4/30/2024  3:57 PM Ortho (on-call MD unless specified)      4/30/2024  3:57 PM Hospitalist (on-call MD unless specified)                Discharge Details        Discharge Medications        Continue These Medications        Instructions Start Date   albuterol sulfate  (90 Base) MCG/ACT inhaler  Commonly known as: PROVENTIL HFA;VENTOLIN HFA;PROAIR HFA   2 puffs, Inhalation, Every 4 Hours PRN       atorvastatin 40 MG tablet  Commonly known as: LIPITOR   40 mg, Oral, Nightly      citalopram 20 MG tablet  Commonly known as: CeleXA   20 mg, Oral, Daily      clopidogrel 75 MG tablet  Commonly known as: PLAVIX   75 mg, Oral, Daily      empagliflozin 10 MG tablet tablet  Commonly known as: Jardiance   10 mg, Oral, Daily      glucose blood test strip   1 each, Other, 4 Times Daily Before Meals & Nightly      glucose monitor monitoring kit   1 each, Does not apply, 4 Times Daily Before Meals & Nightly      Insulin Glargine 100 UNIT/ML injection pen  Commonly known as: LANTUS SOLOSTAR   20 Units, Subcutaneous, Nightly, Has been out is ordered      Lancets misc   1 each, Does not apply, 4 Times Daily Before Meals & Nightly      lisinopril 10 MG tablet  Commonly known as: PRINIVIL,ZESTRIL   5 mg, Oral, Daily      metFORMIN 1000 MG tablet  Commonly known as: GLUCOPHAGE   1,000 mg, Oral, 2 Times Daily With Meals      Pen Needles 32G X 4 MM misc   1 each, Does not apply, Nightly      Xarelto 2.5 MG tablet  Generic drug: Rivaroxaban   2.5 mg, Oral, 2 Times Daily With Meals               No Known Allergies      Discharge Disposition: Rehab      Diet:  Hospital:  Diet Order   Procedures    Diet: Diabetic; Consistent Carbohydrate; Fluid Consistency: Thin (IDDSI 0)         Discharge Activity:         CODE STATUS:  There are no questions and answers to display.         Future Appointments   Date Time Provider Department Center   5/22/2024 12:40 PM LAB  [facility]  [facility] None   5/29/2024 12:40 PM Tariq Polanco MD [facility]   9/25/2024  1:45 PM Merrick Sarmiento MD [facility] CAR NA P [facility] NA           Time spent on Discharge including face to face service:  >30 minutes    Signature: Electronically signed by Heladio Givens MD, 05/03/24, 13:03 EDT.  [facility] Hospitalist Team      Electronically signed by Heladio Givens MD at 05/03/24 5367

## 2024-05-03 NOTE — CASE MANAGEMENT/SOCIAL WORK
Continued Stay Note  [facility]     Patient Name: Constanza Mccall  MRN: [id number]  Today's Date: 5/3/2024    Admit Date: 4/30/2024    Plan: Plan to dc to [place], [facility], accepted. Precert required, started by facility 5/2, pending. PASRR approved.   Discharge Plan       Row Name 05/03/24 1157       Plan    Plan Plan to dc to [place], [facility], accepted. Precert required, started by facility 5/2, pending. PASRR approved.    Plan Comments No DC Barriers, awaiting precert.               Expected Discharge Date and Time       Expected Discharge Date Expected Discharge Time    May 3, 2024               KRISTOFER Cheatham RN  SIPS/ICU   O: [phone]  C: [phone]  [email or web address]

## 2024-05-03 NOTE — PLAN OF CARE
Goal Outcome Evaluation:         Patient is doing well. Was painful right at beginning of shift, treated per MAR. Has been up to the bsc with stand by assist/ ad jonathan and done well. Care continuing.

## 2024-05-03 NOTE — PAYOR COMM NOTE
"This is discharge notification for Ren Mccall  Reference/Auth # [id number]   Pt discharged routine to home on 5/3/24.    FARAZ Sarkar, RN, [facility]  Utilization Review Nurse  [facility]  Direct & confidential phone # [phone]  Fax # [phone]      eRn Mccall (49 y.o. Female)       Date of Birth   1975    Social Security Number       Address   [address] [place] [address] [place] [address]    Home Phone   [phone]    MRN   [id number]       [employer]   None    Marital Status                               Admission Date   4/30/24    Admission Type   Emergency    Admitting Provider   Lamonte Cummings MD    Attending Provider       Department, Room/Bed   [facility] SURGICAL INPATIENT, 4128/1       Discharge Date   5/3/2024    Discharge Disposition   Skilled Nursing Facility (DC - External)    Discharge Destination                                 Attending Provider: (none)   Allergies: No Known Allergies    Isolation: None   Infection: None   Code Status: Prior    Ht: 175.3 cm (69.02\")   Wt: 84.8 kg (186 lb 15.2 oz)    Admission Cmt: None   Principal Problem: Closed fracture of neck of left femur [S72.002A]                   Active Insurance as of 4/30/2024       Primary Coverage       Payor Plan Insurance Group Employer/Plan Group    [facility] -[place] [employer] [place]        Payor Plan Address Payor Plan Phone Number Payor Plan Fax Number Effective Dates    [address]   4/1/2024 - None Entered    [facility] [address]         Subscriber Name Subscriber Birth Date Member ID       REN MCCALL 1975 [id number]                     Emergency Contacts        (Rel.) Home Phone Work Phone Mobile Phone    Astrid mahoney (Sister) [phone] -- --    Padmini Souza -- -- [phone]                 Discharge Summary        Heladio Givens MD at 05/03/24 [address] [place] [address] " Services  Discharge Summary    Date of Service: 5/3/24  Patient Name: Constanza Mccall  : 1975  MRN: [id number]    Date of Admission: 2024  Discharge Diagnosis: #Mechanical fall complicated by left femoral neck fracture, acute blood loss anemia  Date of Discharge:  5/3/24  Primary Care Physician: Shweta Osorio APRN      Presenting Problem:   Hip fracture [S72.009A]  Left hip pain [M25.552]  Fall, initial encounter [W19.XXXA]  Closed fracture of neck of left femur, initial encounter [S72.002A]    Active and Resolved Hospital Problems:  Active Hospital Problems    Diagnosis POA    **Closed fracture of neck of left femur [S72.002A] Unknown    Hip fracture [S72.009A] Yes    Hypertension [I10] Yes    Hyperlipidemia [E78.5] Yes    CAD (coronary artery disease) [I25.10] Yes    Peripheral vascular disease [I73.9] Yes    Type 2 diabetes mellitus with complication, without long-term current use of insulin [E11.8] Yes    Neuropathy [G62.9] Yes      Resolved Hospital Problems   No resolved problems to display.         Hospital Course     HPI:  Admitting team HPI   Constanza Mccall is a 49 y.o. white female with multiple medical problems, past history significant for CAD, diabetes, hypertension, hyperlipidemia, peripheral arterial disease.  who presented to [facility] on 2024 with complaints of left hip pain after mechanical fall today.  Patient states that she was trying to walk through grass with her walker, she has a left BKA, she states that the wheel fell off of her walker causing her to fall directly onto her left hip.  She did not hit her head.  She is complaining of pain only to the left hip.  No back pain or knee pain.  She did not fall on her stump.  She does take blood thinners but denies hitting her head or LOC.  She rates her pain a 10/10.     Hospital Course:  #Mechanical fall complicated by left femoral neck fracture  Seen by orthopedics status post total hip arthroplasty   WBAT as  tolerated  PT/OT> rehab  Continue Xarelto for DVT prophylaxis  Pain control with oxycodone as needed.   Continue bowel regimen  Outpatient follow-up with orthopedics in 3 weeks     #Hypertension  BP soft continue to hold lisinopril this may be resumed as outpatient as blood pressure permits     #Hyperlipidemia #DM2  continue statins      Type 2 diabetes   Continue home regimen     #Acute blood loss anemia  Hemoglobin on admission 11.4 dropped to 9.1 repeat stable       #Depression chronic-stable  continue Celexa     #PAD  Continue Xarelto  Resume Plavix and DC  Continue statins        DISCHARGE Follow Up Recommendations for labs and diagnostics: Outpatient follow-up with orthopedics in 3 weeks      Reasons For Change In Medications and Indications for New Medications:      Day of Discharge     Vital Signs:  Temp:  [98.7 °F (37.1 °C)-99.7 °F (37.6 °C)] 98.7 °F (37.1 °C)  Heart Rate:  [71-74] 71  Resp:  [20] 20  BP: (103-122)/(50-60) 103/50    Physical Exam:  Physical Exam AOx3 NAD  RRR S1-S2 audible  Lungs CTA  Abdomen soft nontender nondistended      Pertinent  and/or Most Recent Results     LAB RESULTS:      Lab 05/03/24  0044 05/02/24  1054 04/30/24  1733 04/30/24  1653   WBC 6.92 5.98  --  11.29*   HEMOGLOBIN 9.1* 9.1*  --  14.2   HEMATOCRIT 28.4* 29.3*  --  43.3   PLATELETS 161 153  --  203   NEUTROS ABS 3.51 3.66  --  7.61*   IMMATURE GRANS (ABS) 0.02 0.02  --  0.12*   LYMPHS ABS 2.48 1.68  --  2.66   MONOS ABS 0.67 0.52  --  0.64   EOS ABS 0.21 0.08  --  0.23   MCV 91.6 96.7  --  90.6   PROCALCITONIN  --   --   --  0.02   PROTIME  --   --  11.1  --    APTT  --   --  39.0*  --          Lab 05/03/24  0044 05/02/24  1054 05/01/24  1453 05/01/24  0802 04/30/24  1653   SODIUM 137  --  137  --  139   POTASSIUM 4.3  --  4.3  --  3.9   CHLORIDE 101  --  103  --  103   CO2 29.0  --  22.0  --  24.0   ANION GAP 7.0  --  12.0  --  12.0   BUN 13  --  12  --  12   CREATININE 0.78  --  0.71  --  0.71   EGFR 93.2  --  104.4   --  104.4   GLUCOSE 139*  --  183*  --  97   CALCIUM 9.0  --  9.0  --  10.2   IONIZED CALCIUM  --   --   --  1.18*  --    MAGNESIUM 2.0 1.8 1.8  --   --    HEMOGLOBIN A1C  --   --   --  6.20*  --    TSH  --   --   --  1.960  --          Lab 05/01/24  1453   TOTAL PROTEIN 6.4   ALBUMIN 3.9   GLOBULIN 2.5   ALT (SGPT) 18   AST (SGOT) 18   BILIRUBIN 0.3   ALK PHOS 74         Lab 05/01/24  1714 05/01/24  1453 04/30/24  1733 04/30/24  1653   PROBNP  --   --   --  54.5   HSTROP T 36* 42*  --   --    PROTIME  --   --  11.1  --    INR  --   --  1.02  --          Lab 05/01/24  1453   CHOLESTEROL 107   LDL CHOL 57   HDL CHOL 30*   TRIGLYCERIDES 104         Lab 04/30/24  1733   ABO TYPING A   RH TYPING Positive   ANTIBODY SCREEN Negative         Brief Urine Lab Results  (Last result in the past 365 days)        Color   Clarity   Blood   Leuk Est   Nitrite   Protein   CREAT   Urine HCG        05/02/24 0444 Yellow   Clear   Negative   Negative   Negative   Negative                 Microbiology Results (last 10 days)       Procedure Component Value - Date/Time    COVID-19, FLU A/B, RSV PCR 1 HR TAT - Swab, Nasopharynx [[id number]]  (Normal) Collected: 04/30/24 2142    Lab Status: Final result Specimen: Swab from Nasopharynx Updated: 04/30/24 2225     COVID19 Not Detected     Influenza A PCR Not Detected     Influenza B PCR Not Detected     RSV, PCR Not Detected    Narrative:      Fact sheet for providers: https://www.fda.gov/media/880595/download    Fact sheet for patients: https://www.fda.gov/media/347182/download    Test performed by PCR.            XR Hip With or Without Pelvis 1 View Left    Result Date: 5/1/2024  Impression: Postoperative changes. Electronically Signed: Jim Varela MD  5/1/2024 4:17 PM EDT  Workstation ID: [id number]    XR Chest 1 View    Result Date: 4/30/2024  Impression: Impression: No acute chest finding. Electronically Signed: Christina Wills MD  4/30/2024 4:08 PM EDT  Workstation ID: [id number]    XR Hip  With or Without Pelvis 2 - 3 View Left    Result Date: 4/30/2024  Impression: 1. Incomplete nondisplaced fracture of the left femoral neck. No dislocation 2. Moderate degenerative change of the left hip. Electronically Signed: Christina Wills MD  4/30/2024 3:47 PM EDT  Workstation ID: [id number]     Results for orders placed during the hospital encounter of 12/17/21    Doppler Ankle Brachial Index Single Level CAR    Interpretation Summary  · Right Conclusion: The right DEEPA is normal. Normal digital pressures.  · Left Conclusion: The left DEEPA is mildly reduced. Normal digital pressures.      Results for orders placed during the hospital encounter of 12/17/21    Doppler Ankle Brachial Index Single Level CAR    Interpretation Summary  · Right Conclusion: The right DEEPA is normal. Normal digital pressures.  · Left Conclusion: The left DEEPA is mildly reduced. Normal digital pressures.      Results for orders placed during the hospital encounter of 12/15/23    Adult Transthoracic Echo Complete W/ Cont if Necessary Per Protocol    Interpretation Summary    Left ventricular systolic function is normal. Left ventricular ejection fraction appears to be 61 - 65%.    Left ventricular diastolic function is consistent with (grade I) impaired relaxation.      Labs Pending at Discharge:      Procedures Performed  Procedure(s):  TOTAL HIP ARTHROPLASTY ANTERIOR WITH TRACTION PIN PLACEMENT         Consults:   Consults       Date and Time Order Name Status Description    5/1/2024  7:19 AM Inpatient Orthopedic Surgery Consult Completed     4/30/2024  3:57 PM Ortho (on-call MD unless specified)      4/30/2024  3:57 PM Hospitalist (on-call MD unless specified)                Discharge Details        Discharge Medications        Continue These Medications        Instructions Start Date   albuterol sulfate  (90 Base) MCG/ACT inhaler  Commonly known as: PROVENTIL HFA;VENTOLIN HFA;PROAIR HFA   2 puffs, Inhalation, Every 4 Hours PRN       atorvastatin 40 MG tablet  Commonly known as: LIPITOR   40 mg, Oral, Nightly      citalopram 20 MG tablet  Commonly known as: CeleXA   20 mg, Oral, Daily      clopidogrel 75 MG tablet  Commonly known as: PLAVIX   75 mg, Oral, Daily      empagliflozin 10 MG tablet tablet  Commonly known as: Jardiance   10 mg, Oral, Daily      glucose blood test strip   1 each, Other, 4 Times Daily Before Meals & Nightly      glucose monitor monitoring kit   1 each, Does not apply, 4 Times Daily Before Meals & Nightly      Insulin Glargine 100 UNIT/ML injection pen  Commonly known as: LANTUS SOLOSTAR   20 Units, Subcutaneous, Nightly, Has been out is ordered      Lancets misc   1 each, Does not apply, 4 Times Daily Before Meals & Nightly      lisinopril 10 MG tablet  Commonly known as: PRINIVIL,ZESTRIL   5 mg, Oral, Daily      metFORMIN 1000 MG tablet  Commonly known as: GLUCOPHAGE   1,000 mg, Oral, 2 Times Daily With Meals      Pen Needles 32G X 4 MM misc   1 each, Does not apply, Nightly      Xarelto 2.5 MG tablet  Generic drug: Rivaroxaban   2.5 mg, Oral, 2 Times Daily With Meals               No Known Allergies      Discharge Disposition: Rehab      Diet:  Hospital:  Diet Order   Procedures    Diet: Diabetic; Consistent Carbohydrate; Fluid Consistency: Thin (IDDSI 0)         Discharge Activity:         CODE STATUS:  There are no questions and answers to display.         Future Appointments   Date Time Provider Department Center   5/22/2024 12:40 PM LAB  [facility]  [facility] None   5/29/2024 12:40 PM Tariq Polanco MD [facility]   9/25/2024  1:45 PM Merrick Sarmiento MD [facility] NA [facility]           Time spent on Discharge including face to face service:  >30 minutes    Signature: Electronically signed by Heladio Givens MD, 05/03/24, 13:03 EDT.  [facility] Hospitalist Team      Electronically signed by Heladio Givens MD at 05/03/24 1251

## 2024-05-03 NOTE — PLAN OF CARE
Goal Outcome Evaluation:              Outcome Evaluation: Pt doing well this shift, resting abed at present. Pt c/o LLE pain/discomfort, treated per MAR. No s/sx of distress noted. Pt anticipates d/c to rehab this shift. VSS call light in reach, plan of care on going

## 2024-05-03 NOTE — SIGNIFICANT NOTE
05/03/24 1331   OTHER   Discipline occupational therapist   Rehab Time/Intention   Session Not Performed other (see comments)  (Hospital dc pending)   Recommendation   OT - Next Appointment 05/06/24

## 2024-05-03 NOTE — CASE MANAGEMENT/SOCIAL WORK
Continued Stay Note  [facility]     Patient Name: Constanza Mccall  MRN: [id number]  Today's Date: 5/3/2024    Admit Date: 4/30/2024    Plan: Plan to dc to [facility], accepted. Precert approved by facility. PASRR approved.   Discharge Plan       Row Name 05/03/24 1247       Plan    Plan Plan to dc to [facility], accepted. Precert approved by facility. PASRR approved.    Plan Comments Precert approved, bed ready per liaison.  Floor RN and MD updated.      Row Name 05/03/24 5688       Plan    Plan Plan to dc to [facility], accepted. Precert required, started by facility 5/2, pending. PASRR approved.    Plan Comments No DC Barriers, awaiting precert.                 Expected Discharge Date and Time       Expected Discharge Date Expected Discharge Time    May 3, 2024               KRISTOFER Cheatham RN  SIPS/ICU   O: [phone]  C: [phone]  [email or web address]

## 2024-05-06 NOTE — CASE MANAGEMENT/SOCIAL WORK
Case Management Discharge Note      Final Note: SNF - [facility] Continued Care - Discharged on 5/3/2024 Admission date: 4/30/2024 - Discharge disposition: Skilled Nursing Facility (DC - External)      Destination Coordination complete.      Service Provider Selected Services Address Phone Fax Patient Preferred    [facility] [place] [address] [phone] --               Transportation Services  W/C Van: Elliot Waldron    Final Discharge Disposition Code: 03 - skilled nursing facility (SNF)      KRISTOFER Cheatham RN  SIPS/ICU   O: [phone]  C: [phone]  [email or web address]

## 2024-08-01 ENCOUNTER — OFFICE VISIT (OUTPATIENT)
Dept: FAMILY MEDICINE CLINIC | Facility: CLINIC | Age: 49
End: 2024-08-01
Payer: MEDICAID

## 2024-08-01 VITALS
WEIGHT: 183.6 LBS | RESPIRATION RATE: 17 BRPM | OXYGEN SATURATION: 98 % | HEIGHT: 69 IN | SYSTOLIC BLOOD PRESSURE: 113 MMHG | TEMPERATURE: 98.6 F | DIASTOLIC BLOOD PRESSURE: 72 MMHG | BODY MASS INDEX: 27.19 KG/M2 | HEART RATE: 62 BPM

## 2024-08-01 DIAGNOSIS — G62.9 NEUROPATHY: ICD-10-CM

## 2024-08-01 DIAGNOSIS — Z09 FOLLOW-UP EXAM, 3-6 MONTHS SINCE PREVIOUS EXAM: Primary | ICD-10-CM

## 2024-08-01 DIAGNOSIS — Z96.642 STATUS POST LEFT HIP REPLACEMENT: ICD-10-CM

## 2024-08-01 DIAGNOSIS — Z78.0 MENOPAUSE: ICD-10-CM

## 2024-08-01 DIAGNOSIS — S72.002D CLOSED FRACTURE OF NECK OF LEFT FEMUR WITH ROUTINE HEALING, SUBSEQUENT ENCOUNTER: ICD-10-CM

## 2024-08-01 RX ORDER — FLUTICASONE PROPIONATE 50 MCG
SPRAY, SUSPENSION (ML) NASAL
COMMUNITY

## 2024-08-01 RX ORDER — GABAPENTIN 400 MG/1
400 CAPSULE ORAL 3 TIMES DAILY
Qty: 90 CAPSULE | Refills: 1 | Status: SHIPPED | OUTPATIENT
Start: 2024-08-01

## 2024-08-01 RX ORDER — CETIRIZINE HYDROCHLORIDE 10 MG/1
TABLET ORAL
COMMUNITY

## 2024-08-01 RX ORDER — LANCETS
EACH MISCELLANEOUS
COMMUNITY

## 2024-08-01 RX ORDER — GABAPENTIN 800 MG/1
TABLET ORAL
COMMUNITY

## 2024-08-01 RX ORDER — LISINOPRIL 10 MG/1
TABLET ORAL
COMMUNITY

## 2024-08-01 NOTE — PROGRESS NOTES
Subjective        Constanza Mccall is a 49 y.o. female.     Chief Complaint   Patient presents with    Diabetes     3 month f/u     HPI    Pt is here for F/U R/T chronic condition(s) and medication(s), lab test(s) and/or refill(s).    Fx of left femur - Pt fell outside and fx'd her left hip. She had left hip replacement then went to [place] for rehabilitation. Some medications have not been refilled since being d/c'd from [place].    Needs to re-schedule appt she missed with Dr. Polanco.    The following portions of the patient's history were reviewed and updated as appropriate: allergies, current medications, past family history, past medical history, past social history, past surgical history and problem list.    Review of Systems     Current Outpatient Medications:     Accu-Chek Softclix Lancets lancets, CHECK BLOOD GLUCOSE TWICE DAILY, Disp: , Rfl:     albuterol sulfate  (90 Base) MCG/ACT inhaler, Inhale 2 puffs Every 4 (Four) Hours As Needed., Disp: , Rfl:     atorvastatin (LIPITOR) 40 MG tablet, Take 1 tablet by mouth Every Night., Disp: 90 tablet, Rfl: 1    cetirizine (zyrTEC) 10 MG tablet, , Disp: , Rfl:     citalopram (CeleXA) 20 MG tablet, Take 1 tablet by mouth Daily., Disp: 90 tablet, Rfl: 1    clopidogrel (PLAVIX) 75 MG tablet, Take 1 tablet by mouth Daily., Disp: 90 tablet, Rfl: 1    empagliflozin (Jardiance) 10 MG tablet tablet, Take 1 tablet by mouth Daily., Disp: 90 tablet, Rfl: 1    gabapentin (NEURONTIN) 800 MG tablet, TAKE 1 TABLET BY MOUTH 2 (TWO) TIMES A DAY FOR 90 DAYS., Disp: , Rfl:     glucose blood test strip, 1 each by Other route 4 (Four) Times a Day Before Meals & at Bedtime., Disp: 100 each, Rfl: 5    glucose blood test strip, CHECK BLOOD GLUCOSE UP TO 3 TIMES A DAY BEFORE MEALS., Disp: , Rfl:     glucose blood test strip, , Disp: , Rfl:     glucose monitor monitoring kit, Use 1 each 4 (Four) Times a Day Before Meals & at Bedtime., Disp: 1 each, Rfl: 0    Insulin Pen Needle  "(Pen Needles) 32G X 4 MM misc, Use 1 each Every Night., Disp: 100 each, Rfl: 2    Lancets misc, Use 1 each 4 (Four) Times a Day Before Meals & at Bedtime., Disp: 100 each, Rfl: 5    lisinopril (PRINIVIL,ZESTRIL) 10 MG tablet, , Disp: , Rfl:     metFORMIN (GLUCOPHAGE) 1000 MG tablet, Take 1 tablet by mouth 2 (Two) Times a Day With Meals for 360 days., Disp: 180 tablet, Rfl: 3    Xarelto 2.5 MG tablet, TAKE 1 TABLET BY MOUTH TWICE A DAY WITH MEALS, Disp: 60 tablet, Rfl: 5    cholecalciferol (VITAMIN D3) 1.25 MG (94515 UT) capsule, , Disp: , Rfl:     fluticasone (FLONASE) 50 MCG/ACT nasal spray, , Disp: , Rfl:     Insulin Glargine (LANTUS SOLOSTAR) 100 UNIT/ML injection pen, Inject 20 Units under the skin into the appropriate area as directed Every Night. Has been out is ordered, Disp: 15 mL, Rfl: 2      Objective     /72 (BP Location: Left arm, Patient Position: Sitting, Cuff Size: Adult)   Pulse 62   Temp 98.6 °F (37 °C) (Oral)   Resp 17   Ht 175.3 cm (69.02\")   Wt 83.3 kg (183 lb 9.6 oz)   SpO2 98%   BMI 27.10 kg/m²     Physical Exam  Vitals reviewed.   Constitutional:       Appearance: Normal appearance. She is normal weight.   Eyes:      Conjunctiva/sclera: Conjunctivae normal.      Pupils: Pupils are equal, round, and reactive to light.   Cardiovascular:      Rate and Rhythm: Regular rhythm. Bradycardia present.      Pulses: Normal pulses.      Heart sounds: Normal heart sounds.   Pulmonary:      Effort: Pulmonary effort is normal.      Breath sounds: Normal breath sounds.   Abdominal:      General: Bowel sounds are normal.      Palpations: Abdomen is soft.   Skin:     General: Skin is warm and dry.      Capillary Refill: Capillary refill takes less than 2 seconds.   Neurological:      Mental Status: She is alert and oriented to person, place, and time.   Psychiatric:         Mood and Affect: Mood normal.         Behavior: Behavior normal.         Thought Content: Thought content normal.         " Judgment: Judgment normal.         Result Review :   The following data was reviewed by: KRISHNA Flaherty on 08/01/2024:  No results found for this or any previous visit (from the past 1344 hour(s)).  Common labs          5/1/2024    08:02 5/1/2024    14:53 5/2/2024    10:54 5/3/2024    00:44   Common Labs   Glucose  183   139    BUN  12   13    Creatinine  0.71   0.78    Sodium  137   137    Potassium  4.3   4.3    Chloride  103   101    Calcium  9.0   9.0    Albumin  3.9      Total Bilirubin  0.3      Alkaline Phosphatase  74      AST (SGOT)  18      ALT (SGPT)  18      WBC   5.98  6.92    Hemoglobin   9.1  9.1    Hematocrit   29.3  28.4    Platelets   153  161    Total Cholesterol  107      Triglycerides  104      HDL Cholesterol  30      LDL Cholesterol   57      Hemoglobin A1C 6.20                  Assessment & Plan    Diagnoses and all orders for this visit:    1. Follow-up exam, 3-6 months since previous exam (Primary)    2. Closed fracture of neck of left femur with routine healing, subsequent encounter    3. Menopause    4. Neuropathy    5. Status post left hip replacement       There are no Patient Instructions on file for this visit.    Follow Up   Return in about 3 months (around 11/1/2024) for Next scheduled follow up.    Patient was given instructions and counseling regarding her condition or for health maintenance advice. Please see specific information pulled into the AVS if appropriate.      KRISHNA Flaherty     08/01/24

## 2024-08-14 ENCOUNTER — HOSPITAL ENCOUNTER (OUTPATIENT)
Dept: BONE DENSITY | Facility: HOSPITAL | Age: 49
Discharge: HOME | End: 2024-08-14
Admitting: REGISTERED NURSE
Payer: MEDICAID

## 2024-08-14 PROCEDURE — 77080 DXA BONE DENSITY AXIAL: CPT

## 2024-09-19 ENCOUNTER — TELEPHONE (OUTPATIENT)
Dept: CARDIOLOGY | Facility: CLINIC | Age: 49
End: 2024-09-19
Payer: MEDICAID

## 2024-09-19 DIAGNOSIS — I73.9 PVD (PERIPHERAL VASCULAR DISEASE): ICD-10-CM

## 2024-09-19 RX ORDER — CLOPIDOGREL BISULFATE 75 MG/1
75 TABLET ORAL DAILY
Qty: 90 TABLET | Refills: 1 | Status: SHIPPED | OUTPATIENT
Start: 2024-09-19

## 2024-09-19 RX ORDER — ATORVASTATIN CALCIUM 40 MG/1
40 TABLET, FILM COATED ORAL NIGHTLY
Qty: 90 TABLET | Refills: 1 | Status: SHIPPED | OUTPATIENT
Start: 2024-09-19

## 2024-09-20 ENCOUNTER — TELEPHONE (OUTPATIENT)
Dept: ENDOCRINOLOGY | Facility: CLINIC | Age: 49
End: 2024-09-20

## 2024-09-20 NOTE — TELEPHONE ENCOUNTER
Caller: Constanza Mccall    Relationship: Self    Best call back number: [phone]     Requested Prescriptions:   Requested Prescriptions      No prescriptions requested or ordered in this encounter    ALL PT Rxs TRANSFERRED TO NEW PRIMARY PHARMACY    Pharmacy where request should be sent:    [facility]  Last office visit with prescribing clinician: 2/28/2024     Additional details provided by patient: PREVIOUS PHARMACY NO LONGER ACCEPTS PT INSURANCE    Does the patient have less than a 3 day supply:  [] Yes  [x] No    Would you like a call back once the refill request has been completed: [x] Yes [] No    If the office needs to give you a call back, can they leave a voicemail: [x] Yes [] No    Emilie Mantilla Rep   09/20/24 13:19 EDT

## 2024-09-21 DIAGNOSIS — G62.9 NEUROPATHY: ICD-10-CM

## 2024-09-21 DIAGNOSIS — F33.1 MODERATE EPISODE OF RECURRENT MAJOR DEPRESSIVE DISORDER: ICD-10-CM

## 2024-09-21 RX ORDER — CITALOPRAM HYDROBROMIDE 20 MG/1
20 TABLET ORAL DAILY
Qty: 90 TABLET | Refills: 1 | Status: SHIPPED | OUTPATIENT
Start: 2024-09-21

## 2024-09-23 DIAGNOSIS — E11.65 TYPE 2 DIABETES MELLITUS WITH HYPERGLYCEMIA, WITH LONG-TERM CURRENT USE OF INSULIN: ICD-10-CM

## 2024-09-23 DIAGNOSIS — Z79.4 TYPE 2 DIABETES MELLITUS WITH HYPERGLYCEMIA, WITH LONG-TERM CURRENT USE OF INSULIN: ICD-10-CM

## 2024-09-23 RX ORDER — GABAPENTIN 400 MG/1
400 CAPSULE ORAL 3 TIMES DAILY
Qty: 90 CAPSULE | Refills: 0 | Status: SHIPPED | OUTPATIENT
Start: 2024-09-23

## 2024-09-25 RX ORDER — LANCETS 30 GAUGE
1 EACH MISCELLANEOUS
Qty: 100 EACH | Refills: 5 | Status: SHIPPED | OUTPATIENT
Start: 2024-09-25

## 2024-09-25 RX ORDER — PEN NEEDLE, DIABETIC 30 GX3/16"
1 NEEDLE, DISPOSABLE MISCELLANEOUS NIGHTLY
Qty: 100 EACH | Refills: 2 | Status: SHIPPED | OUTPATIENT
Start: 2024-09-25

## 2024-10-11 DIAGNOSIS — I73.9 PVD (PERIPHERAL VASCULAR DISEASE): ICD-10-CM

## 2024-10-11 DIAGNOSIS — E11.65 TYPE 2 DIABETES MELLITUS WITH HYPERGLYCEMIA, WITH LONG-TERM CURRENT USE OF INSULIN: ICD-10-CM

## 2024-10-11 DIAGNOSIS — Z79.4 TYPE 2 DIABETES MELLITUS WITH HYPERGLYCEMIA, WITH LONG-TERM CURRENT USE OF INSULIN: ICD-10-CM

## 2024-10-11 RX ORDER — CLOPIDOGREL BISULFATE 75 MG/1
75 TABLET ORAL DAILY
Qty: 90 TABLET | Refills: 1 | Status: SHIPPED | OUTPATIENT
Start: 2024-10-11

## 2024-10-14 RX ORDER — EMPAGLIFLOZIN 10 MG/1
10 TABLET, FILM COATED ORAL DAILY
Qty: 90 TABLET | Refills: 1 | Status: SHIPPED | OUTPATIENT
Start: 2024-10-14

## 2024-10-24 ENCOUNTER — OFFICE VISIT (OUTPATIENT)
Dept: CARDIOLOGY | Facility: CLINIC | Age: 49
End: 2024-10-24
Payer: MEDICAID

## 2024-10-24 VITALS
OXYGEN SATURATION: 97 % | SYSTOLIC BLOOD PRESSURE: 86 MMHG | WEIGHT: 191.2 LBS | HEIGHT: 69 IN | BODY MASS INDEX: 28.32 KG/M2 | DIASTOLIC BLOOD PRESSURE: 57 MMHG | HEART RATE: 84 BPM | RESPIRATION RATE: 18 BRPM

## 2024-10-24 DIAGNOSIS — Z09 FOLLOW-UP EXAM: Primary | ICD-10-CM

## 2024-10-24 DIAGNOSIS — I10 HYPERTENSION, UNSPECIFIED TYPE: ICD-10-CM

## 2024-10-24 DIAGNOSIS — E78.2 MIXED HYPERLIPIDEMIA: ICD-10-CM

## 2024-10-24 DIAGNOSIS — I25.10 CORONARY ARTERY DISEASE INVOLVING NATIVE CORONARY ARTERY OF NATIVE HEART WITHOUT ANGINA PECTORIS: ICD-10-CM

## 2024-10-24 DIAGNOSIS — I73.9 PERIPHERAL VASCULAR DISEASE: ICD-10-CM

## 2024-10-24 RX ORDER — LISINOPRIL 5 MG/1
5 TABLET ORAL DAILY
Qty: 30 TABLET | Refills: 0 | Status: SHIPPED | OUTPATIENT
Start: 2024-10-24 | End: 2024-10-24

## 2024-10-24 RX ORDER — LISINOPRIL 5 MG/1
5 TABLET ORAL DAILY
Qty: 90 TABLET | Refills: 0 | Status: SHIPPED | OUTPATIENT
Start: 2024-10-24

## 2024-10-25 NOTE — PROGRESS NOTES
Cardiology Office Follow Up Visit      Primary Care Provider:  Shweta Osorio APRN    Reason for f/u:     Routine follow up, PVD, HTN, HLD, CAD      Subjective     CC:    Routine follow up    Follow-upPertinent negatives include no chest pain, no dizziness, no palpitations, no shortness of breath, no headaches, no orthopnea, no PND, no syncope, no sputum production, no cough and no diaphoresis.          Constanza Mccall is a 49 y.o. female who is a patient of Dr. Sarmiento. Pmh includes extensive PVD, HTN, HLD, tobacco use, CAD, diabetes, diabetic neuropathy, normal LVEF. She previously underwent LHC for pretest probability which showed small vessel coronary disease in the diagonal as well as  of OM1 not amenable to revascularization with preserved LV systolic function EF 60% with no significant valvular heart disease.      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  Ultimately she was placed on anticoagulation and followed up with repeat AIF with apparent improvement in her distal below the knee runoff.  During that second procedure she was noted to have ostial SFA disease greater than 80% with significant greater than 30 mm gradient between the SFA and femoral artery.  It underwent CSI with drug-coated balloon angioplasty with good results in mild dissection which healed.  She was ultimately brought back demonstrating good healing of this ostial SFA as well as good distal runoff but she had recurrence of stenosis in the peroneal TP trunk as well as proximal anterior tibial on the left.     Ultimately she had  an amputation of the left leg below the knee with advanced peripheral vascular disease, heavy smoking and underlying diabetes.     She presents today for routine follow up. She has no acute complaints or issues today. She has no open wounds. She denies chest pain or shortness of breath. She has a prosthesis and gets around well. Her blood pressure is on the lower side today. She occasionally has dizziness at home.     ASSESSMENT/PLAN:      Diagnoses and all orders for this visit:    1. Follow-up exam (Primary)    2. Peripheral vascular disease    3. Coronary artery disease involving native coronary artery of native heart without angina pectoris    4. Hypertension, unspecified type    5. Mixed hyperlipidemia    Other orders  -     Discontinue: lisinopril (PRINIVIL,ZESTRIL) 5 MG tablet; Take 1 tablet by mouth Daily for 30 days.  Dispense: 30 tablet; Refill: 0    6. Low blood pressure this visit        MEDICAL DECISION MAKING:  Continue statin, Plavix and Xarelto low dose in the setting of her CAD and PVD. I will reduce lisinopril from 10mg daily to 5mg daily and she will monitor blood pressure. If SBP < 110 she will notify us and we may adjust lisinopril further. Continue supportive care, no angina. F/u in 6 mo.           Past Medical History:   Diagnosis Date    Coronary artery disease     Diabetes mellitus     History of echocardiogram 12/17/2018    EF 60% Normal LV diastolic filling parameters. There is normal LV wall thickness. Normla trileaflet aortic valve. Normal mitral valve. Normal LV/RV fucntion with no significant valvulopathy. Normla right anf left pressures estimated.     Hyperlipidemia 12/22/2023    Hypertension 12/22/2023    Neuropathy     Numbness and tingling sensation of skin 06/14/2017    Peripheral artery disease     Rash of hands 06/13/2017    Type 2 diabetes mellitus        Past Surgical History:   Procedure Laterality Date    AMPUTATION DIGIT Left 6/3/2022    Procedure: INCISION AND DRAINAGE  WITH AMPUTATION OF LEFT 3RD TOE;  Surgeon: Deonte Heath Jr., [id number];  Location: [facility] MAIN OR;  Service: Podiatry;  Laterality: Left;    CARDIAC CATHETERIZATION N/A 10/15/2019    Procedure: Left Heart Cath;  Surgeon: Merrick Sarmiento MD;  Location: [facility] CATH INVASIVE LOCATION;  Service: Cardiology    CHOLECYSTECTOMY      OOPHORECTOMY      TOTAL HIP ARTHROPLASTY Left 5/1/2024    Procedure: TOTAL HIP ARTHROPLASTY ANTERIOR WITH TRACTION PIN PLACEMENT;  Surgeon: Michi Solis II, MD;  Location: [facility] MAIN OR;  Service: Orthopedics;  Laterality: Left;         Current Outpatient Medications:     Accu-Chek Softclix Lancets lancets, CHECK BLOOD GLUCOSE TWICE DAILY, Disp: , Rfl:     albuterol sulfate  (90 Base) MCG/ACT inhaler, Inhale 2 puffs Every 4 (Four) Hours As Needed., Disp: , Rfl:     atorvastatin (LIPITOR) 40 MG tablet, Take 1 tablet by mouth Every Night., Disp: 90 tablet, Rfl: 1    cholecalciferol (VITAMIN D3) 1.25 MG (98450 UT) capsule, Take 1 capsule by mouth Every 7 (Seven) Days., Disp: 12 capsule, Rfl: 1    citalopram (CeleXA) 20 MG tablet, TAKE 1 TABLET BY MOUTH EVERY DAY, Disp: 90 tablet, Rfl: 1    clopidogrel (PLAVIX) 75 MG tablet, TAKE 1 TABLET BY MOUTH EVERY DAY, Disp: 90 tablet, Rfl: 1    gabapentin (NEURONTIN) 400 MG capsule, TAKE ONE CAPSULE BY MOUTH THREE TIMES A DAY, Disp: 90 capsule, Rfl: 0    glucose blood test strip, CHECK BLOOD GLUCOSE UP TO 3 TIMES A DAY BEFORE MEALS., Disp: , Rfl:     glucose blood test strip, , Disp: , Rfl:     glucose blood test strip, 1 each by Other route 4 (Four) Times a Day Before Meals & at Bedtime., Disp: 100 each, Rfl: 5    glucose monitor monitoring kit, Use 1 each 4 (Four) Times a Day Before Meals & at Bedtime., Disp: 1 each, Rfl: 0    Insulin Pen Needle (Pen Needles) 32G X 4 MM misc, Use 1 each Every Night., Disp: 100 each, Rfl: 2    Jardiance 10 MG tablet tablet, TAKE 1 TABLET BY MOUTH EVERY DAY, Disp: 90 tablet, Rfl: 1    Lancets misc,  Use 1 each 4 (Four) Times a Day Before Meals & at Bedtime., Disp: 100 each, Rfl: 5    metFORMIN (GLUCOPHAGE) 1000 MG tablet, Take 1 tablet by mouth 2 (Two) Times a Day With Meals for 360 days., Disp: 180 tablet, Rfl: 3    Rivaroxaban (Xarelto) 2.5 MG tablet, Take 1 tablet by mouth 2 (Two) Times a Day With Meals., Disp: 180 tablet, Rfl: 1    lisinopril (PRINIVIL,ZESTRIL) 5 MG tablet, TAKE 1 TABLET BY MOUTH DAILY, Disp: 90 tablet, Rfl: 0    Social History     Socioeconomic History    Marital status:      Spouse name: Sergey    Number of children: 2    Years of education: 12   Tobacco Use    Smoking status: Every Day     Current packs/day: 2.50     Average packs/day: 2.5 packs/day for 20.0 years (50.0 ttl pk-yrs)     Types: Cigarettes     Passive exposure: Current    Smokeless tobacco: Never    Tobacco comments:     Patient advised to quit smoking   Vaping Use    Vaping status: Never Used   Substance and Sexual Activity    Alcohol use: No    Drug use: No    Sexual activity: Defer       Family History   Problem Relation Age of Onset    No Known Problems Mother     Hypertension Father     Diabetes Sister     Heart disease Sister        The following portions of the patient's history were reviewed and updated as appropriate: allergies, current medications, past family history, past medical history, past social history, past surgical history and problem list.    Review of Systems   Constitutional: Negative for chills, diaphoresis and malaise/fatigue.   Cardiovascular:  Negative for chest pain, dyspnea on exertion, irregular heartbeat, leg swelling, near-syncope, orthopnea, palpitations, paroxysmal nocturnal dyspnea and syncope.   Respiratory:  Negative for cough, shortness of breath, sleep disturbances due to breathing and sputum production.    Gastrointestinal:  Negative for change in bowel habit.   Genitourinary:  Negative for urgency.   Neurological:  Negative for dizziness and headaches.   Psychiatric/Behavioral:   "Negative for altered mental status.        Pertinent items are noted in HPI, all other systems reviewed and negative    BP (!) 86/57 (BP Location: Right arm, Patient Position: Sitting, Cuff Size: Large Adult)   Pulse 84   Resp 18   Ht 175.3 cm (69\")   Wt 86.7 kg (191 lb 3.2 oz)   LMP  (LMP Unknown)   SpO2 97%   BMI 28.24 kg/m² .  Objective     Constitutional:       Appearance: Not in distress.   Neck:      Vascular: JVD normal.   Pulmonary:      Effort: Pulmonary effort is normal.      Breath sounds: Normal breath sounds.   Cardiovascular:      Normal rate. Regular rhythm.      Comments: Left BKA  Pulses:     Intact distal pulses.   Edema:     Peripheral edema absent.   Abdominal:      General: Bowel sounds are normal.      Palpations: Abdomen is soft.   Musculoskeletal: Normal range of motion. Skin:     General: Skin is warm and dry.   Neurological:      General: No focal deficit present.      Mental Status: Oriented to person, place and time.           Procedures    EKG ordered by and reviewed by me in office             Constanza SAMI Mccall  reports that she has been smoking cigarettes. She has a 50 pack-year smoking history. She has been exposed to tobacco smoke. She has never used smokeless tobacco. I have educated her on the risk of diseases from using tobacco products such as cancer, COPD, and heart disease.     I advised her to quit and she is not willing to quit.    I spent >10 minutes counseling the patient.           "

## 2024-11-07 ENCOUNTER — OFFICE VISIT (OUTPATIENT)
Dept: FAMILY MEDICINE CLINIC | Facility: CLINIC | Age: 49
End: 2024-11-07
Payer: MEDICAID

## 2024-11-07 VITALS
WEIGHT: 191 LBS | HEIGHT: 69 IN | TEMPERATURE: 98.2 F | BODY MASS INDEX: 28.29 KG/M2 | HEART RATE: 69 BPM | DIASTOLIC BLOOD PRESSURE: 68 MMHG | SYSTOLIC BLOOD PRESSURE: 101 MMHG | RESPIRATION RATE: 15 BRPM | OXYGEN SATURATION: 98 %

## 2024-11-07 DIAGNOSIS — Z23 INFLUENZA VACCINE NEEDED: ICD-10-CM

## 2024-11-07 DIAGNOSIS — E78.2 MIXED HYPERLIPIDEMIA: ICD-10-CM

## 2024-11-07 DIAGNOSIS — Z96.642 STATUS POST LEFT HIP REPLACEMENT: ICD-10-CM

## 2024-11-07 DIAGNOSIS — E11.8 TYPE 2 DIABETES MELLITUS WITH COMPLICATION, WITHOUT LONG-TERM CURRENT USE OF INSULIN: Primary | ICD-10-CM

## 2024-11-07 DIAGNOSIS — F33.1 MODERATE EPISODE OF RECURRENT MAJOR DEPRESSIVE DISORDER: ICD-10-CM

## 2024-11-07 PROCEDURE — 1126F AMNT PAIN NOTED NONE PRSNT: CPT | Performed by: REGISTERED NURSE

## 2024-11-07 PROCEDURE — 3074F SYST BP LT 130 MM HG: CPT | Performed by: REGISTERED NURSE

## 2024-11-07 PROCEDURE — 90471 IMMUNIZATION ADMIN: CPT | Performed by: REGISTERED NURSE

## 2024-11-07 PROCEDURE — 99214 OFFICE O/P EST MOD 30 MIN: CPT | Performed by: REGISTERED NURSE

## 2024-11-07 PROCEDURE — 1160F RVW MEDS BY RX/DR IN RCRD: CPT | Performed by: REGISTERED NURSE

## 2024-11-07 PROCEDURE — 1159F MED LIST DOCD IN RCRD: CPT | Performed by: REGISTERED NURSE

## 2024-11-07 PROCEDURE — 90656 IIV3 VACC NO PRSV 0.5 ML IM: CPT | Performed by: REGISTERED NURSE

## 2024-11-07 PROCEDURE — 3044F HG A1C LEVEL LT 7.0%: CPT | Performed by: REGISTERED NURSE

## 2024-11-07 PROCEDURE — 3078F DIAST BP <80 MM HG: CPT | Performed by: REGISTERED NURSE

## 2024-11-07 NOTE — PROGRESS NOTES
Subjective        Constanza Mccall is a 49 y.o. female.     Chief Complaint   Patient presents with    Diabetes     3 month f/u       History of Present Illness  The patient is a 49-year-old female who presents for a 3-month follow-up.    She reports no current issues with her diabetes. Her diabetes management was previously overseen by Dr. Polanco, who transitioned her from insulin to Jardiance. She monitors her blood sugar levels 2 to 3 times daily, although she admits to not checking them in the past few days. She used to record her readings in a notebook, but it has been misplaced. She can identify when her blood sugar is high as she experiences dizziness and blackouts. She has an upcoming appointment with Dr. Polanco at the [facility], which was previously postponed due to a hip fracture and subsequent rehabilitation. She is scheduled to see Dr. Polanco on 01/10/2025 and Dr. Sarmiento, a cardiologist, on 04/23/2025.    She has a below-knee amputation and is currently learning to walk with a prosthetic. She is making progress in her mobility, now able to navigate steps with the aid of railings. She occasionally experiences muscle spasms in her prosthetic leg. She has been practicing walking with her prosthetic leg in her yard.    She continues to take Lipitor for cholesterol management without any adverse effects. She occasionally experiences muscle cramps, but these are not related to the medication.    She is on a weekly regimen of vitamin D3 50,000 IU and also takes over-the-counter chewable vitamin D3. She has noticed an increase in bowel movements, which are soft in consistency. She has been using adult diapers at night due to difficulty getting up from her mattress on the floor. She reports no bowel or bladder issues, except for occasional urination at night. (She is awaiting her bed frame, then she will not have her mattress on the floor.)    She takes gabapentin 400 mg twice daily, which helps manage  her leg and hip pain.    She is on a daily dose of citalopram 20 mg, which she finds beneficial. Her mood fluctuates depending on her interactions with her nephews. She has been living alone for the past 3 months while her sister is in [place]. She recently lost her  and had to sell her house, which has been a source of distress.    IMMUNIZATIONS  She is up to date on her influenza and pneumonia vaccines.     The following portions of the patient's history were reviewed and updated as appropriate: allergies, current medications, past family history, past medical history, past social history, past surgical history and problem list.    Review of Systems     Current Outpatient Medications:     Accu-Chek Softclix Lancets lancets, CHECK BLOOD GLUCOSE TWICE DAILY, Disp: , Rfl:     albuterol sulfate  (90 Base) MCG/ACT inhaler, Inhale 2 puffs Every 4 (Four) Hours As Needed., Disp: , Rfl:     atorvastatin (LIPITOR) 40 MG tablet, Take 1 tablet by mouth Every Night., Disp: 90 tablet, Rfl: 1    cholecalciferol (VITAMIN D3) 1.25 MG (32943 UT) capsule, Take 1 capsule by mouth Every 7 (Seven) Days., Disp: 12 capsule, Rfl: 1    citalopram (CeleXA) 20 MG tablet, TAKE 1 TABLET BY MOUTH EVERY DAY, Disp: 90 tablet, Rfl: 1    clopidogrel (PLAVIX) 75 MG tablet, TAKE 1 TABLET BY MOUTH EVERY DAY, Disp: 90 tablet, Rfl: 1    gabapentin (NEURONTIN) 400 MG capsule, TAKE ONE CAPSULE BY MOUTH THREE TIMES A DAY, Disp: 90 capsule, Rfl: 0    glucose blood test strip, CHECK BLOOD GLUCOSE UP TO 3 TIMES A DAY BEFORE MEALS., Disp: , Rfl:     glucose blood test strip, , Disp: , Rfl:     glucose blood test strip, 1 each by Other route 4 (Four) Times a Day Before Meals & at Bedtime., Disp: 100 each, Rfl: 5    glucose monitor monitoring kit, Use 1 each 4 (Four) Times a Day Before Meals & at Bedtime., Disp: 1 each, Rfl: 0    Insulin Pen Needle (Pen Needles) 32G X 4 MM misc, Use 1 each Every Night., Disp: 100 each, Rfl: 2    Jardiance 10 MG  "tablet tablet, TAKE 1 TABLET BY MOUTH EVERY DAY, Disp: 90 tablet, Rfl: 1    Lancets misc, Use 1 each 4 (Four) Times a Day Before Meals & at Bedtime., Disp: 100 each, Rfl: 5    lisinopril (PRINIVIL,ZESTRIL) 5 MG tablet, TAKE 1 TABLET BY MOUTH DAILY, Disp: 90 tablet, Rfl: 0    metFORMIN (GLUCOPHAGE) 1000 MG tablet, Take 1 tablet by mouth 2 (Two) Times a Day With Meals for 360 days., Disp: 180 tablet, Rfl: 3    Rivaroxaban (Xarelto) 2.5 MG tablet, Take 1 tablet by mouth 2 (Two) Times a Day With Meals., Disp: 180 tablet, Rfl: 1      Objective     /68 (BP Location: Left arm, Patient Position: Sitting, Cuff Size: Large Adult)   Pulse 69   Temp 98.2 °F (36.8 °C) (Oral)   Resp 15   Ht 175.3 cm (69\")   Wt 86.6 kg (191 lb)   SpO2 98%   BMI 28.21 kg/m²     Physical Exam  Vitals reviewed.   Constitutional:       Appearance: Normal appearance. She is normal weight.   Eyes:      Conjunctiva/sclera: Conjunctivae normal.      Pupils: Pupils are equal, round, and reactive to light.   Cardiovascular:      Rate and Rhythm: Normal rate and regular rhythm.      Pulses: Normal pulses.      Heart sounds: Normal heart sounds.   Pulmonary:      Effort: Pulmonary effort is normal.      Breath sounds: Normal breath sounds.   Abdominal:      General: Bowel sounds are normal.      Palpations: Abdomen is soft.      Tenderness: There is no abdominal tenderness.   Musculoskeletal:      Right lower leg: No edema.      Comments: Patient has a left BKA with a prosthetic limb in place, she also has a leg brace to her right lower leg related to foot drop.   Skin:     General: Skin is warm and dry.      Capillary Refill: Capillary refill takes less than 2 seconds.   Neurological:      Mental Status: She is alert and oriented to person, place, and time.      Sensory: No sensory deficit.      Motor: No weakness.      Gait: Gait abnormal.   Psychiatric:         Mood and Affect: Mood normal.         Behavior: Behavior normal.         Thought " Content: Thought content normal.         Judgment: Judgment normal.         Physical Exam         Result Review :   The following data was reviewed by: KRISHNA Flaherty on 11/07/2024:  No results found for this or any previous visit (from the past 8 weeks).  Common labs          5/1/2024    08:02 5/1/2024    14:53 5/2/2024    10:54 5/3/2024    00:44   Common Labs   Glucose  183   139    BUN  12   13    Creatinine  0.71   0.78    Sodium  137   137    Potassium  4.3   4.3    Chloride  103   101    Calcium  9.0   9.0    Albumin  3.9      Total Bilirubin  0.3      Alkaline Phosphatase  74      AST (SGOT)  18      ALT (SGPT)  18      WBC   5.98  6.92    Hemoglobin   9.1  9.1    Hematocrit   29.3  28.4    Platelets   153  161    Total Cholesterol  107      Triglycerides  104      HDL Cholesterol  30      LDL Cholesterol   57      Hemoglobin A1C 6.20                  Results  Laboratory Studies  Blood glucose levels range from 80 to 113. Hemoglobin A1c was 6.2 in May. Total cholesterol was 107, HDL was 30, LDL was 57, and triglycerides were 104.       Assessment & Plan    Diagnoses and all orders for this visit:    1. Type 2 diabetes mellitus with complication, without long-term current use of insulin (Primary)  -     TSH Rfx On Abnormal To Free T4; Future  -     Lipid Panel; Future  -     Comprehensive Metabolic Panel; Future  -     Hemoglobin A1c; Future  -     Vitamin D,25-Hydroxy; Future    2. Mixed hyperlipidemia  -     TSH Rfx On Abnormal To Free T4; Future  -     Lipid Panel; Future  -     Comprehensive Metabolic Panel; Future  -     CBC & Differential; Future  -     Vitamin D,25-Hydroxy; Future    3. Moderate episode of recurrent major depressive disorder  -     Vitamin D,25-Hydroxy; Future    4. Status post left hip replacement  -     Vitamin D,25-Hydroxy; Future       There are no Patient Instructions on file for this visit.    Assessment & Plan  1. Diabetes Mellitus.  Her blood glucose levels have been  well-managed with Jardiance, consistently staying below 200. The last recorded hemoglobin A1c in May 2024 was 6.2, indicating good control. She is advised to monitor her blood glucose levels 3 to 4 times a week at varying times and maintain a record. A complete blood count, metabolic panel, cholesterol, thyroid function, and A1c will be ordered. She will continue with metformin 1000 mg twice daily with meals and Jardiance 10 mg once daily.    2. Hyperlipidemia.  Her cholesterol levels are within normal range, with a total cholesterol of 107, HDL of 30, LDL of 57, and triglycerides of 104. The LDL to HDL ratio is 1.7, which is satisfactory. She will continue with her current Lipitor regimen.    3. Depression and Anxiety.  She is currently grieving the loss of her  but is managing well with the help of citalopram. She will continue with citalopram 20 mg once daily, taken at night.    4. Leg and Hip Pain.  She will continue with gabapentin 400 mg twice daily. She is advised to discontinue the gummy D3 and continue with the prescription D3. A recheck of her vitamin D3 level will be scheduled.    5. Health Maintenance.  An influenza vaccine will be administered today.    Follow-up  Return in 3 months for follow up.       Patient was given instructions and counseling regarding her condition or for health maintenance advice. Please see specific information pulled into the AVS if appropriate.      KRISHNA Flaherty     11/07/24    Patient or patient representative verbalized consent for the use of Ambient Listening during the visit with  KRISHNA Flaherty for chart documentation. 11/7/2024  14:19 EST

## 2024-12-19 DIAGNOSIS — G62.9 NEUROPATHY: ICD-10-CM

## 2024-12-19 RX ORDER — LISINOPRIL 5 MG/1
5 TABLET ORAL DAILY
Qty: 90 TABLET | Refills: 0 | Status: SHIPPED | OUTPATIENT
Start: 2024-12-19

## 2024-12-20 RX ORDER — GABAPENTIN 400 MG/1
400 CAPSULE ORAL 3 TIMES DAILY
Qty: 270 CAPSULE | Refills: 0 | Status: SHIPPED | OUTPATIENT
Start: 2024-12-20

## 2024-12-23 ENCOUNTER — TELEPHONE (OUTPATIENT)
Dept: FAMILY MEDICINE CLINIC | Facility: CLINIC | Age: 49
End: 2024-12-23
Payer: MEDICAID

## 2024-12-23 NOTE — TELEPHONE ENCOUNTER
Hub ok to relay   Left voicemail for patient to complete labs work then call office for results.

## 2024-12-28 DIAGNOSIS — Z79.4 TYPE 2 DIABETES MELLITUS WITH HYPERGLYCEMIA, WITH LONG-TERM CURRENT USE OF INSULIN: ICD-10-CM

## 2024-12-28 DIAGNOSIS — I73.9 PVD (PERIPHERAL VASCULAR DISEASE): ICD-10-CM

## 2024-12-28 DIAGNOSIS — E11.65 TYPE 2 DIABETES MELLITUS WITH HYPERGLYCEMIA, WITH LONG-TERM CURRENT USE OF INSULIN: ICD-10-CM

## 2024-12-30 RX ORDER — CLOPIDOGREL BISULFATE 75 MG/1
75 TABLET ORAL DAILY
Qty: 90 TABLET | Refills: 3 | Status: SHIPPED | OUTPATIENT
Start: 2024-12-30

## 2024-12-30 RX ORDER — EMPAGLIFLOZIN 10 MG/1
10 TABLET, FILM COATED ORAL DAILY
Qty: 90 TABLET | Refills: 0 | Status: SHIPPED | OUTPATIENT
Start: 2024-12-30

## 2024-12-30 RX ORDER — ATORVASTATIN CALCIUM 40 MG/1
40 TABLET, FILM COATED ORAL NIGHTLY
Qty: 90 TABLET | Refills: 3 | Status: SHIPPED | OUTPATIENT
Start: 2024-12-30

## 2024-12-30 RX ORDER — RIVAROXABAN 2.5 MG/1
2.5 TABLET, FILM COATED ORAL 2 TIMES DAILY WITH MEALS
Qty: 180 TABLET | Refills: 3 | Status: SHIPPED | OUTPATIENT
Start: 2024-12-30

## 2025-01-10 ENCOUNTER — OFFICE VISIT (OUTPATIENT)
Dept: ENDOCRINOLOGY | Facility: CLINIC | Age: 50
End: 2025-01-10
Payer: MEDICAID

## 2025-01-10 ENCOUNTER — LAB (OUTPATIENT)
Dept: LAB | Facility: HOSPITAL | Age: 50
End: 2025-01-10
Payer: MEDICAID

## 2025-01-10 VITALS
WEIGHT: 186 LBS | OXYGEN SATURATION: 97 % | SYSTOLIC BLOOD PRESSURE: 102 MMHG | DIASTOLIC BLOOD PRESSURE: 68 MMHG | HEART RATE: 66 BPM | HEIGHT: 69 IN | BODY MASS INDEX: 27.55 KG/M2

## 2025-01-10 DIAGNOSIS — E11.65 TYPE 2 DIABETES MELLITUS WITH HYPERGLYCEMIA, WITH LONG-TERM CURRENT USE OF INSULIN: Primary | ICD-10-CM

## 2025-01-10 DIAGNOSIS — E78.2 MIXED HYPERLIPIDEMIA: ICD-10-CM

## 2025-01-10 DIAGNOSIS — E11.42 DIABETIC PERIPHERAL NEUROPATHY: ICD-10-CM

## 2025-01-10 DIAGNOSIS — E11.65 TYPE 2 DIABETES MELLITUS WITH HYPERGLYCEMIA, WITH LONG-TERM CURRENT USE OF INSULIN: ICD-10-CM

## 2025-01-10 DIAGNOSIS — F33.1 MODERATE EPISODE OF RECURRENT MAJOR DEPRESSIVE DISORDER: ICD-10-CM

## 2025-01-10 DIAGNOSIS — Z79.4 TYPE 2 DIABETES MELLITUS WITH HYPERGLYCEMIA, WITH LONG-TERM CURRENT USE OF INSULIN: Primary | ICD-10-CM

## 2025-01-10 DIAGNOSIS — Z96.642 STATUS POST LEFT HIP REPLACEMENT: ICD-10-CM

## 2025-01-10 DIAGNOSIS — E66.3 OVERWEIGHT: ICD-10-CM

## 2025-01-10 DIAGNOSIS — Z79.4 TYPE 2 DIABETES MELLITUS WITH HYPERGLYCEMIA, WITH LONG-TERM CURRENT USE OF INSULIN: ICD-10-CM

## 2025-01-10 DIAGNOSIS — E11.8 TYPE 2 DIABETES MELLITUS WITH COMPLICATION, WITHOUT LONG-TERM CURRENT USE OF INSULIN: ICD-10-CM

## 2025-01-10 LAB
25(OH)D3 SERPL-MCNC: 35.5 NG/ML (ref 30–100)
ALBUMIN SERPL-MCNC: 4.1 G/DL (ref 3.5–5.2)
ALBUMIN UR-MCNC: <1.2 MG/DL
ALBUMIN/GLOB SERPL: 1.4 G/DL
ALP SERPL-CCNC: 79 U/L (ref 39–117)
ALT SERPL W P-5'-P-CCNC: 18 U/L (ref 1–33)
ANION GAP SERPL CALCULATED.3IONS-SCNC: 9.9 MMOL/L (ref 5–15)
AST SERPL-CCNC: 17 U/L (ref 1–32)
BASOPHILS # BLD AUTO: 0.03 10*3/MM3 (ref 0–0.2)
BASOPHILS NFR BLD AUTO: 0.4 % (ref 0–1.5)
BILIRUB SERPL-MCNC: 0.3 MG/DL (ref 0–1.2)
BUN SERPL-MCNC: 9 MG/DL (ref 6–20)
BUN/CREAT SERPL: 10.6 (ref 7–25)
CALCIUM SPEC-SCNC: 9.7 MG/DL (ref 8.6–10.5)
CHLORIDE SERPL-SCNC: 104 MMOL/L (ref 98–107)
CHOLEST SERPL-MCNC: 142 MG/DL (ref 0–200)
CO2 SERPL-SCNC: 25.1 MMOL/L (ref 22–29)
CREAT SERPL-MCNC: 0.85 MG/DL (ref 0.57–1)
CREAT UR-MCNC: 36.3 MG/DL
DEPRECATED RDW RBC AUTO: 44 FL (ref 37–54)
EGFRCR SERPLBLD CKD-EPI 2021: 84.1 ML/MIN/1.73
EOSINOPHIL # BLD AUTO: 0.36 10*3/MM3 (ref 0–0.4)
EOSINOPHIL NFR BLD AUTO: 4.3 % (ref 0.3–6.2)
ERYTHROCYTE [DISTWIDTH] IN BLOOD BY AUTOMATED COUNT: 13.4 % (ref 12.3–15.4)
GLOBULIN UR ELPH-MCNC: 3 GM/DL
GLUCOSE BLDC GLUCOMTR-MCNC: 118 MG/DL (ref 70–105)
GLUCOSE SERPL-MCNC: 112 MG/DL (ref 65–99)
HBA1C MFR BLD: 6.82 % (ref 4.8–5.6)
HCT VFR BLD AUTO: 42.9 % (ref 34–46.6)
HDLC SERPL-MCNC: 41 MG/DL (ref 40–60)
HGB BLD-MCNC: 13.3 G/DL (ref 12–15.9)
IMM GRANULOCYTES # BLD AUTO: 0.02 10*3/MM3 (ref 0–0.05)
IMM GRANULOCYTES NFR BLD AUTO: 0.2 % (ref 0–0.5)
LDLC SERPL CALC-MCNC: 78 MG/DL (ref 0–100)
LDLC/HDLC SERPL: 1.84 {RATIO}
LYMPHOCYTES # BLD AUTO: 2.87 10*3/MM3 (ref 0.7–3.1)
LYMPHOCYTES NFR BLD AUTO: 34.7 % (ref 19.6–45.3)
MCH RBC QN AUTO: 27.8 PG (ref 26.6–33)
MCHC RBC AUTO-ENTMCNC: 31 G/DL (ref 31.5–35.7)
MCV RBC AUTO: 89.6 FL (ref 79–97)
MICROALBUMIN/CREAT UR: NORMAL MG/G{CREAT}
MONOCYTES # BLD AUTO: 0.54 10*3/MM3 (ref 0.1–0.9)
MONOCYTES NFR BLD AUTO: 6.5 % (ref 5–12)
NEUTROPHILS NFR BLD AUTO: 4.46 10*3/MM3 (ref 1.7–7)
NEUTROPHILS NFR BLD AUTO: 53.9 % (ref 42.7–76)
NRBC BLD AUTO-RTO: 0 /100 WBC (ref 0–0.2)
PLATELET # BLD AUTO: 273 10*3/MM3 (ref 140–450)
PMV BLD AUTO: 10.5 FL (ref 6–12)
POTASSIUM SERPL-SCNC: 4.1 MMOL/L (ref 3.5–5.2)
PROT SERPL-MCNC: 7.1 G/DL (ref 6–8.5)
RBC # BLD AUTO: 4.79 10*6/MM3 (ref 3.77–5.28)
SODIUM SERPL-SCNC: 139 MMOL/L (ref 136–145)
TRIGL SERPL-MCNC: 127 MG/DL (ref 0–150)
TSH SERPL DL<=0.05 MIU/L-ACNC: 1.11 UIU/ML (ref 0.27–4.2)
VLDLC SERPL-MCNC: 23 MG/DL (ref 5–40)
WBC NRBC COR # BLD AUTO: 8.28 10*3/MM3 (ref 3.4–10.8)

## 2025-01-10 PROCEDURE — 80061 LIPID PANEL: CPT

## 2025-01-10 PROCEDURE — 36415 COLL VENOUS BLD VENIPUNCTURE: CPT

## 2025-01-10 PROCEDURE — 82306 VITAMIN D 25 HYDROXY: CPT

## 2025-01-10 PROCEDURE — 82570 ASSAY OF URINE CREATININE: CPT

## 2025-01-10 PROCEDURE — 80050 GENERAL HEALTH PANEL: CPT

## 2025-01-10 PROCEDURE — 82948 REAGENT STRIP/BLOOD GLUCOSE: CPT | Performed by: INTERNAL MEDICINE

## 2025-01-10 PROCEDURE — 82043 UR ALBUMIN QUANTITATIVE: CPT

## 2025-01-10 PROCEDURE — 83036 HEMOGLOBIN GLYCOSYLATED A1C: CPT

## 2025-01-10 NOTE — PROGRESS NOTES
-----------------------------------------------------------------  ENDOCRINE CLINIC NOTE  -----------------------------------------------------------------        PATIENT NAME: Constanza Mccall  PATIENT : 1975 AGE: 49 y.o.  MRN NUMBER: [id number]  PRIMARY CARE: Shweta Osorio APRN    ==========================================================================    CHIEF COMPLAINT: T2DM  DATE OF SERVICE: 01/10/25    ==========================================================================    HPI / SUBJECTIVE    49 y.o. female is seen in the clinic today for T2DM.  Diagnosis Date: around 10 yr ago  Current Therapy / Medications:  Metformin 1000 mg twice a day  Jardiance 10 mg 1 pill by mouth daily  Past tried medications: (Not currently using)  Lantus - not taking for around 10 months  Home BG logs / monitoring: Not checking BG at home  Three meals a day  No recent eye exams  Neuropathy: Hx of left BKA in   Since last visit had left hip fracture in 2024  Nephropathy: no CKD  No hx of CAD but have PAD    ==========================================================================                                                PAST MEDICAL HISTORY    Past Medical History:   Diagnosis Date    Coronary artery disease     Diabetes mellitus     History of echocardiogram 2018    EF 60% Normal LV diastolic filling parameters. There is normal LV wall thickness. Normla trileaflet aortic valve. Normal mitral valve. Normal LV/RV fucntion with no significant valvulopathy. Normla right anf left pressures estimated.     Hyperlipidemia 2023    Hypertension 2023    Neuropathy     Numbness and tingling sensation of skin 2017    Peripheral artery disease     Rash of hands 2017    Type 2 diabetes mellitus        ==========================================================================    PAST SURGICAL HISTORY    Past Surgical History:   Procedure Laterality Date    AMPUTATION DIGIT Left  6/3/2022    Procedure: INCISION AND DRAINAGE WITH AMPUTATION OF LEFT 3RD TOE;  Surgeon: Deonte Heath Jr., DPM;  Location: [facility] MAIN OR;  Service: Podiatry;  Laterality: Left;    CARDIAC CATHETERIZATION N/A 10/15/2019    Procedure: Left Heart Cath;  Surgeon: Merrick Sarmiento MD;  Location: [facility] CATH INVASIVE LOCATION;  Service: Cardiology    CHOLECYSTECTOMY      OOPHORECTOMY      TOTAL HIP ARTHROPLASTY Left 5/1/2024    Procedure: TOTAL HIP ARTHROPLASTY ANTERIOR WITH TRACTION PIN PLACEMENT;  Surgeon: Michi Solis II, MD;  Location: [facility] MAIN OR;  Service: Orthopedics;  Laterality: Left;       ==========================================================================    FAMILY HISTORY    Family History   Problem Relation Age of Onset    No Known Problems Mother     Hypertension Father     Diabetes Sister     Heart disease Sister        ==========================================================================    SOCIAL HISTORY    Social History     Socioeconomic History    Marital status:      Spouse name: Sergey    Number of children: 2    Years of education: 12   Tobacco Use    Smoking status: Every Day     Current packs/day: 2.50     Average packs/day: 2.5 packs/day for 20.0 years (50.0 ttl pk-yrs)     Types: Cigarettes     Passive exposure: Current    Smokeless tobacco: Never    Tobacco comments:     Patient advised to quit smoking   Vaping Use    Vaping status: Never Used   Substance and Sexual Activity    Alcohol use: No    Drug use: No    Sexual activity: Defer       ==========================================================================    MEDICATIONS      Current Outpatient Medications:     Accu-Chek Softclix Lancets lancets, CHECK BLOOD GLUCOSE TWICE DAILY, Disp: , Rfl:     albuterol sulfate  (90 Base) MCG/ACT inhaler, Inhale 2 puffs Every 4 (Four) Hours As Needed., Disp: , Rfl:     atorvastatin (LIPITOR) 40 MG tablet, TAKE 1 TABLET BY MOUTH EVERY NIGHT, Disp: 90  tablet, Rfl: 3    cholecalciferol (VITAMIN D3) 1.25 MG (14435 UT) capsule, Take 1 capsule by mouth Every 7 (Seven) Days., Disp: 12 capsule, Rfl: 1    citalopram (CeleXA) 20 MG tablet, TAKE 1 TABLET BY MOUTH EVERY DAY, Disp: 90 tablet, Rfl: 1    clopidogrel (PLAVIX) 75 MG tablet, TAKE 1 TABLET BY MOUTH DAILY, Disp: 90 tablet, Rfl: 3    empagliflozin (Jardiance) 10 MG tablet tablet, Take 1 tablet by mouth Daily., Disp: 90 tablet, Rfl: 1    gabapentin (NEURONTIN) 400 MG capsule, TAKE 1 CAPSULE BY MOUTH THREE TIMES DAILY, Disp: 270 capsule, Rfl: 0    glucose blood test strip, CHECK BLOOD GLUCOSE UP TO 3 TIMES A DAY BEFORE MEALS., Disp: , Rfl:     glucose blood test strip, , Disp: , Rfl:     glucose blood test strip, 1 each by Other route 4 (Four) Times a Day Before Meals & at Bedtime., Disp: 100 each, Rfl: 5    glucose monitor monitoring kit, Use 1 each 4 (Four) Times a Day Before Meals & at Bedtime., Disp: 1 each, Rfl: 0    Insulin Pen Needle (Pen Needles) 32G X 4 MM misc, Use 1 each Every Night., Disp: 100 each, Rfl: 2    Lancets misc, Use 1 each 4 (Four) Times a Day Before Meals & at Bedtime., Disp: 100 each, Rfl: 5    lisinopril (PRINIVIL,ZESTRIL) 5 MG tablet, TAKE 1 TABLET BY MOUTH DAILY, Disp: 90 tablet, Rfl: 0    metFORMIN (GLUCOPHAGE) 1000 MG tablet, Take 1 tablet by mouth 2 (Two) Times a Day With Meals for 360 days., Disp: 180 tablet, Rfl: 3    Rivaroxaban (Xarelto) 2.5 MG tablet, TAKE 1 TABLET BY MOUTH TWICE DAILY WITH MEALS, Disp: 180 tablet, Rfl: 3    ==========================================================================    ALLERGIES    No Known Allergies    ==========================================================================    OBJECTIVE    Vitals:    01/10/25 0841   BP: 102/68   Pulse: 66   SpO2: 97%       Body mass index is 27.47 kg/m².     General: Alert, cooperative, no acute distress  Extremities:  Left sided prosthetic in  place    ==========================================================================    LAB EVALUATION    Lab Results   Component Value Date    GLUCOSE 139 (H) 05/03/2024    BUN 13 05/03/2024    CREATININE 0.78 05/03/2024    EGFRIFNONA 122 03/12/2021    BCR 16.7 05/03/2024    K 4.3 05/03/2024    CO2 29.0 05/03/2024    CALCIUM 9.0 05/03/2024    ALBUMIN 3.9 05/01/2024    LABIL2 1.8 05/25/2021    AST 18 05/01/2024    ALT 18 05/01/2024    CHOL 107 05/01/2024    TRIG 104 05/01/2024    HDL 30 (L) 05/01/2024    LDL 57 05/01/2024     Lab Results   Component Value Date    HGBA1C 6.20 (H) 05/01/2024    HGBA1C 6.80 (H) 02/07/2024    HGBA1C 7.90 (H) 12/22/2023     Lab Results   Component Value Date    MICROALBUR <1.2 12/22/2023    CREATININE 0.78 05/03/2024     Lab Results   Component Value Date    TSH 1.960 05/01/2024       ==========================================================================    ASSESSMENT AND PLAN    # Type 2 diabetes with hyperglycemia  # Diabetic peripheral neuropathy  # Overweight with BMI of 27.47    - Unfortunately I do not have any recent blood work or any fingerstick blood sugars to review  - Patient last A1c of 6.2% in May 2024  - Patient was previously maintained on insulin therapy which she discontinued after starting Jardiance therapy and reports her blood sugar was staying stable  - For now we will repeat blood work along with microalbumin and urine  - Target is to maintain A1c less than 6.5% without any hypoglycemia  - In the future if needed can also introduce DPP 4 number therapy or GLP-1 receptor agonist therapy  - Counseled patient to start checking blood sugar at least 2-3 times a week  - For now therapy will stay as:  Metformin 1000 mg twice a day  Jardiance 10 mg 1 pill by mouth daily  - Referral provided for diabetic eye exam again  - Follow up in 3 months    Thank you for courtesy of consultation.    Return to clinic: 3 months    Entire assessment and plan was discussed and counseled  "the patient in detail to which patient verbalized understanding and agreed with care.  Answered all queries and concerns.    This note was created using voice recognition software and is inherently subject to errors including those of syntax and \"sound-alike\" substitutions which may escape proofreading.  In such instances, original meaning may be extrapolated by contextual derivation.    Note: Portions of this note may have been copied from previous notes but documentation have been reviewed and edited as necessary to support clinical decision making for today's visit.    ==========================================================================    INFORMATION PROVIDED TO PATIENT    Patient Instructions   Please,    - Continue metformin therapy 1000 mg twice a day.  - Continue Jardiance 10 mg 1 pill by mouth daily.    Check your blood sugar twice a week in the morning and maintain logs.    Please make appointment to see ophthalmologist for diabetic eye exam.    Please get blood work done today and repeat blood work before next visit as well.    Clinical follow-up in 3 months time.    Thank you for your visit today.    If you have any questions or concerns please feel free to reach out of the office.       ==========================================================================  Tariq Polanco MD  Department of Endocrine, Diabetes and Metabolism  [facility], [place]  ==========================================================================  "

## 2025-01-10 NOTE — PATIENT INSTRUCTIONS
Please,    - Continue metformin therapy 1000 mg twice a day.  - Continue Jardiance 10 mg 1 pill by mouth daily.    Check your blood sugar twice a week in the morning and maintain logs.    Please make appointment to see ophthalmologist for diabetic eye exam.    Please get blood work done today and repeat blood work before next visit as well.    Clinical follow-up in 3 months time.    Thank you for your visit today.    If you have any questions or concerns please feel free to reach out of the office.

## 2025-02-04 ENCOUNTER — OFFICE VISIT (OUTPATIENT)
Dept: FAMILY MEDICINE CLINIC | Facility: CLINIC | Age: 50
End: 2025-02-04
Payer: MEDICAID

## 2025-02-04 VITALS
OXYGEN SATURATION: 98 % | BODY MASS INDEX: 27.88 KG/M2 | RESPIRATION RATE: 18 BRPM | HEART RATE: 69 BPM | DIASTOLIC BLOOD PRESSURE: 59 MMHG | TEMPERATURE: 98.5 F | SYSTOLIC BLOOD PRESSURE: 95 MMHG | HEIGHT: 69 IN | WEIGHT: 188.2 LBS

## 2025-02-04 DIAGNOSIS — F33.1 MODERATE EPISODE OF RECURRENT MAJOR DEPRESSIVE DISORDER: ICD-10-CM

## 2025-02-04 DIAGNOSIS — I10 PRIMARY HYPERTENSION: ICD-10-CM

## 2025-02-04 DIAGNOSIS — G44.209 ACUTE NON INTRACTABLE TENSION-TYPE HEADACHE: ICD-10-CM

## 2025-02-04 DIAGNOSIS — E11.42 DIABETIC PERIPHERAL NEUROPATHY: ICD-10-CM

## 2025-02-04 DIAGNOSIS — R06.02 SOBOE (SHORTNESS OF BREATH ON EXERTION): ICD-10-CM

## 2025-02-04 DIAGNOSIS — E78.2 MIXED HYPERLIPIDEMIA: Primary | ICD-10-CM
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
## 2025-02-04 PROBLEM — Z78.0 OSTEOPENIA AFTER MENOPAUSE: Status: ACTIVE | Noted: 2025-02-04

## 2025-02-04 PROBLEM — M85.80 OSTEOPENIA AFTER MENOPAUSE: Status: ACTIVE | Noted: 2025-02-04

## 2025-02-04 RX ORDER — CITALOPRAM HYDROBROMIDE 20 MG/1
20 TABLET ORAL DAILY
Qty: 90 TABLET | Refills: 1 | Status: SHIPPED | OUTPATIENT
Start: 2025-02-04

## 2025-02-04 RX ORDER — ALBUTEROL SULFATE 90 UG/1
2 INHALANT RESPIRATORY (INHALATION) EVERY 4 HOURS PRN
Qty: 8 G | Refills: 2 | Status: SHIPPED | OUTPATIENT
Start: 2025-02-04

## 2025-02-04 RX ORDER — ASPIRIN 81 MG/1
81 TABLET ORAL DAILY
Qty: 90 TABLET | Refills: 1 | Status: SHIPPED | OUTPATIENT
Start: 2025-02-04

## 2025-02-04 NOTE — PROGRESS NOTES
"Subjective        Constanza Mccall is a 49 y.o. female who presents to [facility].     Chief Complaint   Patient presents with    Diabetes     3 month fl/u       Diabetes        Pt is here for F/U R/T chronic condition(s) and medication(s), lab test(s) and/or refill(s).    HTN - chronic/stable. Pt denies CP/SOA/HA/dizziness/pedal edema. Followed by Dr. Sarmiento cardiology. Recently started on lisinopril. BP is low in the office, she has no complaints of dizziness, nausea, or HA.   Hyperlipidemia - chronic/stable. Pt denies muscle cramps, N/V/D, jaundice. Followed by Dr. Sarmiento cardiology.  Diabetic neuropathy - chronic/stable. Well controlled with gabapentin.  DM T2 - Chronic/stable. Sees Dr. Polanco, endocrinology.  Fall - Fell 2/1/25 off the side of her tall bed. She had moved to sit on the edge of the mattress to watch TV that night and she slipped off. Landed on her left stump and hit her head on the TV screen which broke. She says she did not feel dizzy when fall occurred, \"just slipped off the edge of the mattress.\" Bruised area on anterior distal area of stump. No lacerations. She says that taking a baby aspirin is sufficient for the aching.    She requests a prescription for 81 mg ASA PO daily for occasional aches and pains AND refill on albuterol MDI to use when the weather gets hot which \"makes me cough and get short of breath\".    The following portions of the patient's history were reviewed and updated as appropriate: allergies, current medications, past family history, past medical history, past social history, past surgical history and problem list.    No Known Allergies       Current Outpatient Medications:     Accu-Chek Softclix Lancets lancets, CHECK BLOOD GLUCOSE TWICE DAILY, Disp: , Rfl:     albuterol sulfate  (90 Base) MCG/ACT inhaler, Inhale 2 puffs Every 4 (Four) Hours As Needed for Shortness of Air., Disp: 8 g, Rfl: 2    atorvastatin (LIPITOR) 40 MG tablet, TAKE " "1 TABLET BY MOUTH EVERY NIGHT, Disp: 90 tablet, Rfl: 3    cholecalciferol (VITAMIN D3) 1.25 MG (88924 UT) capsule, Take 1 capsule by mouth Every 7 (Seven) Days., Disp: 12 capsule, Rfl: 1    citalopram (CeleXA) 20 MG tablet, Take 1 tablet by mouth Daily., Disp: 90 tablet, Rfl: 1    clopidogrel (PLAVIX) 75 MG tablet, TAKE 1 TABLET BY MOUTH DAILY, Disp: 90 tablet, Rfl: 3    empagliflozin (Jardiance) 10 MG tablet tablet, Take 1 tablet by mouth Daily., Disp: 90 tablet, Rfl: 1    gabapentin (NEURONTIN) 400 MG capsule, TAKE 1 CAPSULE BY MOUTH THREE TIMES DAILY, Disp: 270 capsule, Rfl: 0    glucose blood test strip, CHECK BLOOD GLUCOSE UP TO 3 TIMES A DAY BEFORE MEALS., Disp: , Rfl:     glucose blood test strip, , Disp: , Rfl:     glucose blood test strip, 1 each by Other route 4 (Four) Times a Day Before Meals & at Bedtime., Disp: 100 each, Rfl: 5    glucose monitor monitoring kit, Use 1 each 4 (Four) Times a Day Before Meals & at Bedtime., Disp: 1 each, Rfl: 0    Insulin Pen Needle (Pen Needles) 32G X 4 MM misc, Use 1 each Every Night., Disp: 100 each, Rfl: 2    Lancets misc, Use 1 each 4 (Four) Times a Day Before Meals & at Bedtime., Disp: 100 each, Rfl: 5    lisinopril (PRINIVIL,ZESTRIL) 5 MG tablet, TAKE 1 TABLET BY MOUTH DAILY, Disp: 90 tablet, Rfl: 0    metFORMIN (GLUCOPHAGE) 1000 MG tablet, Take 1 tablet by mouth 2 (Two) Times a Day With Meals for 360 days., Disp: 180 tablet, Rfl: 3    Rivaroxaban (Xarelto) 2.5 MG tablet, TAKE 1 TABLET BY MOUTH TWICE DAILY WITH MEALS, Disp: 180 tablet, Rfl: 3    aspirin 81 MG EC tablet, Take 1 tablet by mouth Daily., Disp: 90 tablet, Rfl: 1    Review of Systems     Objective     BP 95/59 (BP Location: Left arm, Patient Position: Sitting, Cuff Size: Adult)   Pulse 69   Temp 98.5 °F (36.9 °C) (Oral)   Resp 18   Ht 175.3 cm (69.02\")   Wt 85.4 kg (188 lb 3.2 oz)   SpO2 98%   BMI 27.78 kg/m²     BMI is >= 25 and <30. (Overweight) The following options were offered after " discussion;: Her weight is appropriate for her frame and stature.     Physical Exam  Vitals reviewed.   Constitutional:       Appearance: Normal appearance. She is normal weight.   Eyes:      Conjunctiva/sclera: Conjunctivae normal.   Cardiovascular:      Rate and Rhythm: Normal rate and regular rhythm.      Pulses:           Dorsalis pedis pulses are 1+ on the right side.      Heart sounds: Normal heart sounds. No murmur heard.     No friction rub. No gallop.   Pulmonary:      Effort: Pulmonary effort is normal.      Breath sounds: Examination of the right-upper field reveals decreased breath sounds. Examination of the left-upper field reveals decreased breath sounds. Examination of the right-middle field reveals decreased breath sounds. Examination of the right-lower field reveals decreased breath sounds. Examination of the left-lower field reveals decreased breath sounds. Decreased breath sounds present. No wheezing, rhonchi or rales.   Abdominal:      General: Bowel sounds are normal.      Palpations: Abdomen is soft.      Tenderness: There is no abdominal tenderness.   Musculoskeletal:      Right lower leg: No edema.      Left Lower Extremity: Left leg is amputated below knee.   Skin:     General: Skin is warm and dry.   Neurological:      General: No focal deficit present.      Mental Status: She is alert and oriented to person, place, and time.      Sensory: No sensory deficit.      Motor: No weakness.      Gait: Gait abnormal.      Comments: She has a LBK amputation with a prosthesis and does not need a cane to walk.   Psychiatric:         Mood and Affect: Mood normal.         Behavior: Behavior normal.         Thought Content: Thought content normal.         Judgment: Judgment normal.         PHQ-2 Depression Screening  Little interest or pleasure in doing things? Not at all   Feeling down, depressed, or hopeless? Not at all   PHQ-2 Total Score 0         Result Review    The following data was reviewed by:  KRISHNA Flaherty on 02/04/2025:  Common labs          5/2/2024    10:54 5/3/2024    00:44 1/10/2025    11:00   Common Labs   Glucose  139  112    BUN  13  9    Creatinine  0.78  0.85    Sodium  137  139    Potassium  4.3  4.1    Chloride  101  104    Calcium  9.0  9.7    Albumin   4.1    Total Bilirubin   0.3    Alkaline Phosphatase   79    AST (SGOT)   17    ALT (SGPT)   18    WBC 5.98  6.92  8.28    Hemoglobin 9.1  9.1  13.3    Hematocrit 29.3  28.4  42.9    Platelets 153  161  273    Total Cholesterol   142    Triglycerides   127    HDL Cholesterol   41    LDL Cholesterol    78    Hemoglobin A1C   6.82    Microalbumin, Urine   <1.2                 Assessment & Plan    Diagnoses and all orders for this visit:    1. Mixed hyperlipidemia (Primary)    2. Primary hypertension    3. Moderate episode of recurrent major depressive disorder  -     citalopram (CeleXA) 20 MG tablet; Take 1 tablet by mouth Daily.  Dispense: 90 tablet; Refill: 1    4. Acute non intractable tension-type headache  -     aspirin 81 MG EC tablet; Take 1 tablet by mouth Daily.  Dispense: 90 tablet; Refill: 1    5. SOBOE (shortness of breath on exertion)  -     albuterol sulfate  (90 Base) MCG/ACT inhaler; Inhale 2 puffs Every 4 (Four) Hours As Needed for Shortness of Air.  Dispense: 8 g; Refill: 2    6. Fall as cause of accidental injury in home as place of occurrence, initial encounter  -     XR Tibia Fibula 2 View Left; Future  -     XR Skull Lateral & Ap; Future    7. Diabetic peripheral neuropathy  Assessment & Plan:  Diabetes is stable.   Continue current treatment regimen.  Diabetes will be reassessed in 6 months         Patient Instructions   Call your pharmacy for refills within 7 days of needing medication.     Please call the office if you have any questions or concerns.    Thank you,  KRISHNA Wood-BENTLEY    Office number:   [phone]       Follow Up   Return in about 6 months (around 8/4/2025) for Recheck.    Patient  was given instructions and counseling regarding her condition or for health maintenance advice. Please see specific information pulled into the AVS if appropriate.     Shweta Osorio, APRN     02/06/25

## 2025-02-04 NOTE — PATIENT INSTRUCTIONS
Call your pharmacy for refills within 7 days of needing medication.     Please call the office if you have any questions or concerns.    Thank you,  JAMES Wood    Office number:   [phone]

## 2025-02-24 ENCOUNTER — TELEPHONE (OUTPATIENT)
Dept: FAMILY MEDICINE CLINIC | Facility: CLINIC | Age: 50
End: 2025-02-24
Payer: MEDICAID

## 2025-02-24 NOTE — TELEPHONE ENCOUNTER
Hub ok to relay   Left voicemail for patient to complete X-ray Skull and X-ray Tibia Fibula that Shweta ordered then call office for results.

## 2025-03-12 ENCOUNTER — HOSPITAL ENCOUNTER (OUTPATIENT)
Dept: GENERAL RADIOLOGY | Facility: HOSPITAL | Age: 50
Discharge: HOME | End: 2025-03-12
Payer: MEDICAID
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
## 2025-03-12 PROCEDURE — 73590 X-RAY EXAM OF LOWER LEG: CPT

## 2025-03-12 PROCEDURE — 70250 X-RAY EXAM OF SKULL: CPT

## 2025-03-12 RX ORDER — GABAPENTIN 800 MG/1
800 TABLET ORAL 2 TIMES DAILY
Qty: 180 TABLET | Refills: 0 | Status: SHIPPED | OUTPATIENT
Start: 2025-03-12 | End: 2025-03-17 | Stop reason: DRUGHIGH

## 2025-03-13 DIAGNOSIS — G62.9 NEUROPATHY: ICD-10-CM

## 2025-03-13 NOTE — ADDENDUM NOTE
"Subjective   Liliam Lorenz is a 29 y.o. female who presents today in follow up for management of generalized anxiety disorder with panic attacks and OCD.    Patient reports that she is doing well overall since last visit approximately 8 weeks ago but does however report a recent increase in both the frequency and severity of panic attacks experienced over the past several weeks.  More specifically, patient reports experiencing roughly 5 panic attacks in total since our last visit all of which have occurred over the past several weeks.  She does endorse 1 occasion in which she experienced 3 separate panic attacks one after the other, and states that she experienced the 2 other panic attacks weeks apart.  Patient denies experiencing any specific trigger/stressor and states that these panic attacks did \"come out of nowhere.\"  Patient endorses experiencing acute onset of an impending sense of doom/feeling as if she is going to die associated with tachycardia, increased rate of breathing and numbness/tingling of the extremities and perioral area.  Patient reports that she did check her vitals during these panic attacks to ensure overall stability and that she did not visit the emergency department or urgent care for evaluation after any of these attacks.  She also denies utilizing her as needed alprazolam due to fear of excessive sedation.  Otherwise, patient once again denies a depressed mood or any other significant depressive symptoms.  She also reports an overall improvement levels of anxiety and does report improved ability to control/stop her anxiety and worry.  She denies experiencing any issues managing her OCD symptom since last visit.  Patient otherwise denies any auditory, visual, or tactile hallucinations and does not currently appear to be responding to internal stimuli.  Patient denies any generalized paranoia and displays no evidence of delusional thinking.    The following portions of the patient's " Addended by: CATHERINE SALAMANCA on: 1/2/2020 02:44 PM     Modules accepted: Orders     history were reviewed and updated as appropriate: allergies, current medications, past family history, past medical history, past social history, past surgical history and problem list.       Past Medical History:  Past Medical History:   Diagnosis Date    Anxiety     Bronchitis     Chest pain     Chronic hypotension     Depression     Eczema     Phoebe-Danlos syndrome     Gastroparesis     GERD (gastroesophageal reflux disease)     History of pulmonary embolus (PE)     > 15 per patient    IBS (irritable bowel syndrome)     Lightheadedness     Nausea and vomiting 08/22/2017    Obsessive-compulsive disorder     Panic disorder     POTS (postural orthostatic tachycardia syndrome)     Rectal fissure     Rosacea     Sleep apnea     uses CPAP    Tachycardia        Social History:  Social History     Socioeconomic History    Marital status:     Number of children: 0    Highest education level: Master's degree (e.g., MA, MS, Eduin, MEd, MSW, TIANNA)   Tobacco Use    Smoking status: Never     Passive exposure: Never    Smokeless tobacco: Never    Tobacco comments:     Caffeine: 1 serving daily   Vaping Use    Vaping status: Never Used   Substance and Sexual Activity    Alcohol use: No    Drug use: Never    Sexual activity: Yes     Partners: Female     Birth control/protection: None       Family History:  Family History   Problem Relation Age of Onset    Hypertension Mother     Diabetes Mother     Hypertension Father     Diabetes Father     Coronary artery disease Maternal Uncle         Maternal uncle with MI    Hypertension Maternal Grandmother     Coronary artery disease Maternal Grandmother         MGM with 4 vessel CABG and multiple stents    Cancer Maternal Grandmother     Stroke Maternal Grandmother     Coronary artery disease Maternal Grandfather     Breast cancer Other     Malig Hyperthermia Neg Hx        Past Surgical History:  Past Surgical History:   Procedure Laterality Date    COLONOSCOPY      MEDIPORT  INSERTION, DOUBLE  09/01/2020    [facility]     TUNNELED VENOUS CATHETER PLACEMENT N/A 12/14/2023    Procedure: EXCHANGE OF DOLAN CATHETER;  Surgeon: Lorne Cordova MD;  Location:  [facility];  Service: General;  Laterality: N/A;    UPPER GASTROINTESTINAL ENDOSCOPY      VENOUS ACCESS DEVICE (PORT) INSERTION Left 10/16/2020    Procedure: INSERTION VENOUS ACCESS DEVICE UNDER FLURO;  Surgeon: Pa Lane MD;  Location: [facility];  Service: General;  Laterality: Left;    VENOUS ACCESS DEVICE (PORT) INSERTION Right 10/26/2021    Procedure: REMOVAL AND INSERTION OF DOLAN CATHETER;  Surgeon: Pa Lane MD;  Location: [facility];  Service: General;  Laterality: Right;    VENOUS ACCESS DEVICE (PORT) INSERTION Left 1/13/2023    Procedure: INSERTION OF DOLAN CATHETER, VENOGARM;  Surgeon: Cristhian Anne MD;  Location: [facility] MAIN OR;  Service: General;  Laterality: Left;       Problem List:  Patient Active Problem List   Diagnosis    Poor venous access    POTS (postural orthostatic tachycardia syndrome)    Sinus tachycardia    Multiple subsegmental pulmonary emboli without acute cor pulmonale    Autoimmune disease    Phoebe-Danlos syndrome    Nausea and vomiting    Gastroparesis    Postural orthostatic tachycardia syndrome    Dolan catheter dysfunction    Febrile illness, acute    Single subsegmental pulmonary embolism without acute cor pulmonale    Cellulitis of chest wall    Pulmonary embolus    Generalized anxiety disorder with panic attacks    Medically complex patient    Mixed obsessional thoughts and acts    Major depressive disorder, recurrent, in partial remission       Allergy:   Allergies   Allergen Reactions    Tape Other (See Comments)    Pepcid [Famotidine] Rash and GI Intolerance    Scopolamine Rash and GI Intolerance        Current Medications:   Current Outpatient Medications   Medication Sig Dispense Refill    albuterol sulfate  (90 Base) MCG/ACT  "inhaler Inhale 2 puffs Every 4 (Four) Hours As Needed for Wheezing.      BD PrecisionGlide Needle 23G X 1-1/2\" misc       cholecalciferol (Cholecalciferol) 25 MCG (1000 UT) tablet Take 1 tablet by mouth Daily.      cyanocobalamin 1000 MCG/ML injection Inject 1 mL into the appropriate muscle as directed by prescriber Every 28 (Twenty-Eight) Days. 1 mL 0    dronabinol (MARINOL) 5 MG capsule Take 1 capsule by mouth 3 (Three) Times a Day As Needed (nausea).      Emgality 120 MG/ML auto-injector pen Every 30 (Thirty) Days. Takes the 20th of the month      escitalopram (LEXAPRO) 20 MG tablet Take 1 tablet by mouth Daily for 90 days. 30 tablet 2    fludrocortisone 0.1 MG tablet Take 1 tablet by mouth Daily.      fondaparinux (ARIXTRA) 10 MG/0.8ML solution injection Inject  under the skin into the appropriate area as directed Daily.      Ginger, Zingiber officinalis, (Ginger Root) 550 MG capsule Take  by mouth Daily.      levocetirizine (XYZAL) 5 MG tablet Take 1 tablet by mouth Every Evening.      meclizine (ANTIVERT) 25 MG tablet Take 1 tablet by mouth 3 (Three) Times a Day As Needed for Dizziness.      metoprolol tartrate (LOPRESSOR) 25 MG tablet Take 1 tablet by mouth Every Night. 90 tablet 3    midodrine (PROAMATINE) 10 MG tablet TAKE 1 TABLET BY MOUTH THREE TIMES DAILY (Patient taking differently: Take 1 tablet by mouth 2 (Two) Times a Day.) 90 tablet 6    mirtazapine (REMERON) 30 MG tablet Take 1 tablet by mouth Every Night. 30 tablet 2    montelukast (SINGULAIR) 10 MG tablet Take 1 tablet by mouth Every Night.      omeprazole (priLOSEC) 40 MG capsule Take 1 capsule by mouth Daily.      phenazopyridine (PYRIDIUM) 100 MG tablet Take 1 tablet by mouth 3 (Three) Times a Day As Needed.      prochlorperazine (COMPAZINE) 10 MG tablet Take 1 tablet by mouth Every 6 (Six) Hours As Needed for Nausea or Vomiting.      Promethazine HCl (PHENERGAN PO) Take  by mouth.      RABEprazole (ACIPHEX) 20 MG EC tablet Take 1 tablet by " "mouth Daily.       No current facility-administered medications for this visit.       Review of Symptoms:    Review of Systems   Constitutional:  Positive for activity change and fatigue.   HENT:  Negative for tinnitus.    Endocrine: Negative for cold intolerance and heat intolerance.   Skin:  Negative for rash.   Neurological:  Negative for seizures and confusion.   Psychiatric/Behavioral:  Positive for decreased concentration. Negative for hallucinations, self-injury, sleep disturbance, suicidal ideas and depressed mood. The patient is nervous/anxious.          Physical Exam:   Blood pressure 118/80, pulse 89, height 162.6 cm (64.02\"), weight 112 kg (248 lb), SpO2 98%, not currently breastfeeding.  Appearance: Appears documented age, appropriate hygiene and grooming.  Gait, Station, Strength: Normal gait, station and strength.       Mental Status Exam:   Hygiene:   good  Cooperation:  Cooperative  Eye Contact:  Good  Psychomotor Behavior:  Appropriate  Affect:  Full range and Appropriate  Mood: normal and euthymic  Hopelessness: Denies  Speech:  Normal  Thought Process:  Goal directed and Linear  Thought Content:  Normal  Suicidal:  None  Homicidal:  None  Hallucinations:  None  Delusion:  None  Memory:  Intact  Orientation:  Person, Place, Time, and Situation  Reliability:  good  Insight:  Good  Judgement:  Good  Impulse Control:  Good      Lab Results:   No visits with results within 1 Month(s) from this visit.   Latest known visit with results is:   Admission on 08/19/2024, Discharged on 08/19/2024   Component Date Value Ref Range Status    QT Interval 08/19/2024 341  ms Final    QTC Interval 08/19/2024 476  ms Final    Glucose 08/19/2024 150 (H)  65 - 99 mg/dL Final    BUN 08/19/2024 8  6 - 20 mg/dL Final    Creatinine 08/19/2024 0.79  0.57 - 1.00 mg/dL Final    Sodium 08/19/2024 134 (L)  136 - 145 mmol/L Final    Potassium 08/19/2024 3.7  3.5 - 5.2 mmol/L Final    Chloride 08/19/2024 104  98 - 107 mmol/L " Final    CO2 08/19/2024 17.4 (L)  22.0 - 29.0 mmol/L Final    Calcium 08/19/2024 9.3  8.6 - 10.5 mg/dL Final    Total Protein 08/19/2024 7.3  6.0 - 8.5 g/dL Final    Albumin 08/19/2024 4.2  3.5 - 5.2 g/dL Final    ALT (SGPT) 08/19/2024 49 (H)  1 - 33 U/L Final    AST (SGOT) 08/19/2024 27  1 - 32 U/L Final    Alkaline Phosphatase 08/19/2024 104  39 - 117 U/L Final    Total Bilirubin 08/19/2024 0.5  0.0 - 1.2 mg/dL Final    Globulin 08/19/2024 3.1  gm/dL Final    A/G Ratio 08/19/2024 1.4  g/dL Final    BUN/Creatinine Ratio 08/19/2024 10.1  7.0 - 25.0 Final    Anion Gap 08/19/2024 12.6  5.0 - 15.0 mmol/L Final    eGFR 08/19/2024 104.0  >60.0 mL/min/1.73 Final    proBNP 08/19/2024 40.4  0.0 - 450.0 pg/mL Final    HS Troponin T 08/19/2024 7  <14 ng/L Final    Extra Tube 08/19/2024 Hold for add-ons.   Final    Auto resulted.    Extra Tube 08/19/2024 hold for add-on   Final    Auto resulted    Extra Tube 08/19/2024 Hold for add-ons.   Final    Auto resulted.    Extra Tube 08/19/2024 Hold for add-ons.   Final    Auto resulted    WBC 08/19/2024 9.86  3.40 - 10.80 10*3/mm3 Final    RBC 08/19/2024 5.36 (H)  3.77 - 5.28 10*6/mm3 Final    Hemoglobin 08/19/2024 14.5  12.0 - 15.9 g/dL Final    Hematocrit 08/19/2024 44.8  34.0 - 46.6 % Final    MCV 08/19/2024 83.6  79.0 - 97.0 fL Final    MCH 08/19/2024 27.1  26.6 - 33.0 pg Final    MCHC 08/19/2024 32.4  31.5 - 35.7 g/dL Final    RDW 08/19/2024 22.5 (H)  12.3 - 15.4 % Final    RDW-SD 08/19/2024 65.0 (H)  37.0 - 54.0 fl Final    MPV 08/19/2024 9.7  6.0 - 12.0 fL Final    Platelets 08/19/2024 377  140 - 450 10*3/mm3 Final    Neutrophil % 08/19/2024 74.1  42.7 - 76.0 % Final    Lymphocyte % 08/19/2024 20.2  19.6 - 45.3 % Final    Monocyte % 08/19/2024 3.2 (L)  5.0 - 12.0 % Final    Eosinophil % 08/19/2024 1.6  0.3 - 6.2 % Final    Basophil % 08/19/2024 0.6  0.0 - 1.5 % Final    Immature Grans % 08/19/2024 0.3  0.0 - 0.5 % Final    Neutrophils, Absolute 08/19/2024 7.30 (H)  1.70 - 7.00  10*3/mm3 Final    Lymphocytes, Absolute 08/19/2024 1.99  0.70 - 3.10 10*3/mm3 Final    Monocytes, Absolute 08/19/2024 0.32  0.10 - 0.90 10*3/mm3 Final    Eosinophils, Absolute 08/19/2024 0.16  0.00 - 0.40 10*3/mm3 Final    Basophils, Absolute 08/19/2024 0.06  0.00 - 0.20 10*3/mm3 Final    Immature Grans, Absolute 08/19/2024 0.03  0.00 - 0.05 10*3/mm3 Final    nRBC 08/19/2024 0.0  0.0 - 0.2 /100 WBC Final    Glucose 08/19/2024 127  70 - 130 mg/dL Final    Right Internal Jugular Spont 08/19/2024 Y   Final    Right Internal Jugular Phasic 08/19/2024 Y   Final    Right Internal Jugular Compress 08/19/2024 C   Final    Right Internal Jugular Augment 08/19/2024 Y   Final    Right Subclavian Spont 08/19/2024 Y   Final    Right Subclavian Phasic 08/19/2024 Y   Final    Right Subclavian Compress 08/19/2024 C   Final    Right Subclavian Augment 08/19/2024 Y   Final    Right Axillary Spont 08/19/2024 Y   Final    Right Axillary Phasic 08/19/2024 Y   Final    Right Axillary Compress 08/19/2024 C   Final    Right Axillary Augment 08/19/2024 Y   Final    Right Brachial Compress 08/19/2024 C   Final    Right Radial Compress 08/19/2024 C   Final    Right Ulnar Compress 08/19/2024 C   Final    Right Basilic Upper Compress 08/19/2024 C   Final    Right Basilic Forearm Compress 08/19/2024 C   Final    Right Cephalic Upper Compress 08/19/2024 C   Final    Right Cephalic Forearm Compress 08/19/2024 C   Final    Left Internal Jugular Spont 08/19/2024 Y   Final    Left Internal Jugular Phasic 08/19/2024 Y   Final    Left Internal Jugular Compress 08/19/2024 C   Final    Left Internal Jugular Augment 08/19/2024 Y   Final    Left Subclavian Spont 08/19/2024 Y   Final    Left Subclavian Phasic 08/19/2024 Y   Final    Left Subclavian Compress 08/19/2024 C   Final    Left Subclavian Augment 08/19/2024 Y   Final    Left Axillary Spont 08/19/2024 Y   Final    Left Axillary Phasic 08/19/2024 Y   Final    Left Axillary Compress 08/19/2024 C    Final    Left Axillary Augment 08/19/2024 Y   Final    Left Brachial Compress 08/19/2024 C   Final    Left Radial Compress 08/19/2024 C   Final    Left Ulnar Compress 08/19/2024 C   Final    Left Basilic Upper Compress 08/19/2024 C   Final    Left Basilic Forearm Compress 08/19/2024 C   Final    Left Cephalic Upper Compress 08/19/2024 C   Final    Left Cephalic Forearm Compress 08/19/2024 C   Final       PHQ-9 Total Score: 2    GAUTAM-7 Total Score: 7     Assessment & Plan    Diagnoses and all orders for this visit:    1. Generalized anxiety disorder with panic attacks (Primary)    2. Mixed obsessional thoughts and acts         Liliam Lorenz is a 29 y.o. female who presents today in follow up for management of generalized anxiety disorder with panic attacks and OCD.    As detailed above patient appears to be doing well overall but does endorse a recent increase in both the frequency and severity of panic attacks stating that she has experienced approximately 5 panic attacks over the past several weeks.  As mentioned above she denies experiencing any specific trigger/stressors associated with the onset of these panic attacks and does state that they have been rather characteristic of her panic attacks in the past.  Patient states that she did not require evaluation via the emergency department or urgent care as she did monitor her vital signs at home which remained within normal limits.  Patient also denies utilizing as needed alprazolam during these panic attacks despite the reported severity due to fears of excessive sedation.  Patient was highly encouraged to utilize her as needed alprazolam at 0.5 mg for management of these acute panic attacks as the patient does report positive therapeutic benefit associated with this medication in regard to past panic attacks but does endorse rather significant sedation associated with her current dose.  Given the fact the patient has only used this medication roughly 5 times  since August 2024 I have no concern about misuse of this medication and do feel as if she would benefit from his utilization for appropriate management of her panic attacks.  Otherwise, I recommend continuing all other psychiatric medications at this time given reported stability in both mood and anxious symptoms overall as evidenced by current PHQ-9 and GAUTAM-7 scores..  Patient will be seen again in approximately 8 weeks.  She voices understanding of this and is agreeable to this plan.      Medications:  -Continue mirtazapine 30 mg p.o. nightly for management of generalized anxiety disorder with panic attacks and major depressive disorder.  -Continue escitalopram 20 mg p.o. once daily for management of generalized anxiety disorder with panic attacks, major depressive disorder and OCD.  -Continue alprazolam 0.25 mg p.o. twice daily as needed for acute anxiety.  Prescription filled by previous provider on 8/29/2024.  Patient reports very rarely utilizing this medication since last visit.      TREATMENT PLAN - SHORT AND LONG-TERM GOALS:   -Continue supportive psychotherapy efforts and medications as indicated. Treatment and medication options discussed during today's visit.   -Patient acknowledged and verbally consented to continue with current treatment plan and was educated on the importance of compliance with treatment and follow-up appointments.    SUMMARY/EDUCATION/DISCUSSION:  -Pt was given appropriate time to ask questions and concerns were addressed. A thorough discussion was had that included review of disease process, need for continued monitoring and additional treatment options including use of pharmacological and non-pharmacological approaches to care, decisions were made and agreed upon by patient and provider.   -Discussed medication options and treatment plan of prescribed medication as well as the risks, benefits, and side effects including potential falls, possible impaired driving and metabolic  adversities among others; patient acknowledged and provided verbal consent.   -Patient has been educated regarding multimodal approach with healthy nutrition, healthy sleep, regular physical activity, social activities, counseling, and medications.  -Please call the office at [phone] within normal business hours (Monday-Friday, 8:00 AM - 4:30 PM) with any worsening of symptoms or onset of intolerable side effects. Please ask to leave a message with office staff.  Please allow up to 24-48 hours for response to a patient call/question/refill request.  -Safety plan has been established and discussed in detail with the patient, who is agreeable to contact support system and/or call 911 or go to the nearest ER should he/she/they have any thoughts of harm to self or others.    MEDS ORDERED DURING VISIT:  No orders of the defined types were placed in this encounter.      FOLLOW UP:  Return in about 8 weeks (around 5/8/2025) for Next scheduled follow up.      Juan Ramon Doran DO    This document has been electronically signed by Juan Ramon Doran DO  March 13, 2025 13:34 EDT    Part of this note may be an electronic transcription/translation of spoken language to printed text using the Dragon Dictation System. Some of the data in this electronic note has been brought forward from a previous encounter, any necessary changes have been made, it has been reviewed by this provider, and it is accurate.

## 2025-03-14 ENCOUNTER — TELEPHONE (OUTPATIENT)
Dept: FAMILY MEDICINE CLINIC | Facility: CLINIC | Age: 50
End: 2025-03-14
Payer: MEDICAID

## 2025-03-14 RX ORDER — GABAPENTIN 400 MG/1
400 CAPSULE ORAL 3 TIMES DAILY
Qty: 270 CAPSULE | Refills: 0 | Status: SHIPPED | OUTPATIENT
Start: 2025-03-14

## 2025-03-14 NOTE — TELEPHONE ENCOUNTER
Caller: Ren Mccall    Relationship: Self    Best call back number: [phone]     Caller requesting test results: REN    What test was performed: X-RAYS    When was the test performed: 3/12/25    Where was the test performed: [facility]    Additional notes:

## 2025-04-03 ENCOUNTER — TELEPHONE (OUTPATIENT)
Dept: FAMILY MEDICINE CLINIC | Facility: CLINIC | Age: 50
End: 2025-04-03
Payer: MEDICAID

## 2025-04-03 NOTE — TELEPHONE ENCOUNTER
[id number] [place] [address] [facility] [phone] [facility] [phone] FX       What medication are you calling in regards to: GABAPENTIN DOSAGES     What question does the pharmacy have: THEY HAVE TWO DIFFERENT STRENGTHS AND DIRECTIONS AND WANTED TO CLARIFY WHICH ONE SHE IS ON     Who is the provider that prescribed the medication: KRISHNA MCGREGOR     Additional notes:

## 2025-04-07 NOTE — TELEPHONE ENCOUNTER
I spoke with the pharmacist and advised her the 800mg tablets were d/c and that the patient should only fill the 400mg capsule p.o TID.  Pharmacist will update in their system and only fill the 400mg rx.

## 2025-04-17 NOTE — PROGRESS NOTES
Chief Complaint  Follow-up (6 mo)    Subjective        Constanza Mccall presents to [facility] CARDIOLOGY  History of Present Illness  80-year-old female presents for follow-up.  Last seen in clinic on October 25 2024.  She has a past medical history of PVD, hypertension, hyperlipidemia, tobacco use, CAD, diabetes, diabetic neuropathy.  On her last routine clinic visit she had no acute complaints or issues.    Today she has no acute cardiac complaints.  She denies chest pain, palpitations, shortness of breath, dyspnea on exertion, or claudication symptoms.    Of note in the past she had undergone a left heart catheterization for a stress test that showed a high pretest probability.  Left heart catheterization revealed small vessel coronary disease in the diagonal branch as well as a  of the OM1 which was not amendable to revascularization.    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  Ultimately she was placed on anticoagulation and followed up with repeat AIF with apparent improvement in her distal below the knee runoff.  During that second procedure she was noted to have ostial SFA disease greater than 80% with significant greater than 30 mm gradient between the SFA and femoral artery.  It underwent CSI with drug-coated balloon angioplasty with good results in mild dissection which healed.  She was ultimately brought back demonstrating good healing of this ostial SFA as well as good distal runoff but she had recurrence of stenosis in the peroneal TP trunk as well as proximal anterior  "tibial on the left.     Ultimately she had an amputation of the left leg below the knee with advanced peripheral vascular disease, heavy smoking and underlying diabetes.     Review of Systems   Constitutional: Negative.    HENT: Negative.     Respiratory: Negative.     Cardiovascular: Negative.    Musculoskeletal: Negative.    Skin: Negative.    Neurological: Negative.         Objective   Vital Signs:  /59 (BP Location: Right arm, Patient Position: Sitting)   Pulse 63 Comment: ekg  Resp 16   Ht 175.3 cm (69.02\")   SpO2 96%   BMI 27.78 kg/m²   Estimated body mass index is 27.78 kg/m² as calculated from the following:    Height as of this encounter: 175.3 cm (69.02\").    Weight as of 2/4/25: 85.4 kg (188 lb 3.2 oz).            Physical Exam  Vitals and nursing note reviewed.   Constitutional:       General: She is not in acute distress.     Appearance: Normal appearance. She is not toxic-appearing.   HENT:      Head: Normocephalic and atraumatic.      Nose: Nose normal.      Mouth/Throat:      Mouth: Mucous membranes are moist.      Pharynx: Oropharynx is clear.   Eyes:      Pupils: Pupils are equal, round, and reactive to light.   Cardiovascular:      Rate and Rhythm: Normal rate and regular rhythm.      Pulses: Normal pulses.      Heart sounds: Normal heart sounds.   Pulmonary:      Effort: Pulmonary effort is normal.      Breath sounds: Normal breath sounds.   Abdominal:      General: Bowel sounds are normal.      Palpations: Abdomen is soft.   Musculoskeletal:         General: Normal range of motion.      Cervical back: Normal range of motion and neck supple.      Left Lower Extremity: Left leg is amputated above knee.   Skin:     General: Skin is warm and dry.   Neurological:      General: No focal deficit present.      Mental Status: She is alert and oriented to person, place, and time.   Psychiatric:         Mood and Affect: Mood normal.         Behavior: Behavior normal.         Thought Content: " Thought content normal.         Judgment: Judgment normal.        Result Review :  The following data was reviewed by: Mariano Brar DO on 04/18/2025:  Common labs          5/2/2024    10:54 5/3/2024    00:44 1/10/2025    11:00   Common Labs   Glucose  139  112    BUN  13  9    Creatinine  0.78  0.85    Sodium  137  139    Potassium  4.3  4.1    Chloride  101  104    Calcium  9.0  9.7    Albumin   4.1    Total Bilirubin   0.3    Alkaline Phosphatase   79    AST (SGOT)   17    ALT (SGPT)   18    WBC 5.98  6.92  8.28    Hemoglobin 9.1  9.1  13.3    Hematocrit 29.3  28.4  42.9    Platelets 153  161  273    Total Cholesterol   142    Triglycerides   127    HDL Cholesterol   41    LDL Cholesterol    78    Hemoglobin A1C   6.82    Microalbumin, Urine   <1.2      CMP          5/1/2024    14:53 5/3/2024    00:44 1/10/2025    11:00   CMP   Glucose 183  139  112    BUN 12  13  9    Creatinine 0.71  0.78  0.85    EGFR 104.4  93.2  84.1    Sodium 137  137  139    Potassium 4.3  4.3  4.1    Chloride 103  101  104    Calcium 9.0  9.0  9.7    Total Protein 6.4   7.1    Albumin 3.9   4.1    Globulin 2.5   3.0    Total Bilirubin 0.3   0.3    Alkaline Phosphatase 74   79    AST (SGOT) 18   17    ALT (SGPT) 18   18    Albumin/Globulin Ratio 1.6   1.4    BUN/Creatinine Ratio 16.9  16.7  10.6    Anion Gap 12.0  7.0  9.9      CBC          5/2/2024    10:54 5/3/2024    00:44 1/10/2025    11:00   CBC   WBC 5.98  6.92  8.28    RBC 3.03  3.10  4.79    Hemoglobin 9.1  9.1  13.3    Hematocrit 29.3  28.4  42.9    MCV 96.7  91.6  89.6    MCH 30.0  29.4  27.8    MCHC 31.1  32.0  31.0    RDW 13.1  13.2  13.4    Platelets 153  161  273      CBC w/diff          5/2/2024    10:54 5/3/2024    00:44 1/10/2025    11:00   CBC w/Diff   WBC 5.98  6.92  8.28    RBC 3.03  3.10  4.79    Hemoglobin 9.1  9.1  13.3    Hematocrit 29.3  28.4  42.9    MCV 96.7  91.6  89.6    MCH 30.0  29.4  27.8    MCHC 31.1  32.0  31.0    RDW 13.1  13.2  13.4    Platelets 153   161  273    Neutrophil Rel % 61.3  50.8  53.9    Immature Granulocyte Rel % 0.3  0.3  0.2    Lymphocyte Rel % 28.1  35.8  34.7    Monocyte Rel % 8.7  9.7  6.5    Eosinophil Rel % 1.3  3.0  4.3    Basophil Rel % 0.3  0.4  0.4      Lipid Panel          5/1/2024    14:53 1/10/2025    11:00   Lipid Panel   Total Cholesterol 107  142    Triglycerides 104  127    HDL Cholesterol 30  41    VLDL Cholesterol 20  23    LDL Cholesterol  57  78    LDL/HDL Ratio 1.87  1.84      TSH          5/1/2024    08:02 1/10/2025    11:00   TSH   TSH 1.960  1.110           ECG 12 Lead    Date/Time: 4/18/2025 3:41 PM  Performed by: Mariano Brar DO    Authorized by: Mariano Brar DO  Rhythm: sinus rhythm  Other findings: low voltage    Clinical impression: normal ECG          TTE 10/2023    Left ventricular systolic function is normal. Left ventricular ejection fraction appears to be 61 - 65%.    Left ventricular diastolic function is consistent with (grade I) impaired relaxation.     Stress Testing 12/2023    Left ventricular ejection fraction is normal (Calculated EF = 70%).    Myocardial perfusion imaging indicates a normal myocardial perfusion study with no evidence of ischemia.    Impressions are consistent with a low risk study.    This is normal Cardiolite imaging stress test with no evidence of ischemia or myocardial infarction.  Left ventricle size and function is normal on gated SPECT imaging.  No wall motion abnormality was noted.  Clinical correlation recommended.  Further recommendation as per ordering physician. .    Findings consistent with a normal ECG stress test.    Assessment and Plan   Diagnoses and all orders for this visit:    1. Peripheral vascular disease (Primary)  Assessment & Plan:  Status post left above-the-knee amputation  Repeat duplex of the right lower extremity    Orders:  -     US Arterial Doppler Lower Extremity Right; Future    2. Coronary artery disease involving native coronary artery of native  heart without angina pectoris  Assessment & Plan:  Continue Plavix  Stable heart disease.  Reports no chest pain      3. Primary hypertension  Assessment & Plan:  Continue current medical management      4. Mixed hyperlipidemia  Assessment & Plan:  Continue atorvastatin      5. Overweight    6. Type 2 diabetes mellitus with hyperglycemia, with long-term current use of insulin  Assessment & Plan:  Continue following with endocrinology.      Other orders  -     ECG 12 Lead             Follow Up   No follow-ups on file.  Patient was given instructions and counseling regarding her condition or for health maintenance advice. Please see specific information pulled into the AVS if appropriate.

## 2025-04-18 ENCOUNTER — OFFICE VISIT (OUTPATIENT)
Dept: CARDIOLOGY | Facility: CLINIC | Age: 50
End: 2025-04-18
Payer: MEDICAID

## 2025-04-18 VITALS
BODY MASS INDEX: 27.78 KG/M2 | OXYGEN SATURATION: 96 % | RESPIRATION RATE: 16 BRPM | SYSTOLIC BLOOD PRESSURE: 117 MMHG | HEART RATE: 63 BPM | DIASTOLIC BLOOD PRESSURE: 59 MMHG | HEIGHT: 69 IN

## 2025-04-18 DIAGNOSIS — Z79.4 TYPE 2 DIABETES MELLITUS WITH HYPERGLYCEMIA, WITH LONG-TERM CURRENT USE OF INSULIN: ICD-10-CM

## 2025-04-18 DIAGNOSIS — E78.2 MIXED HYPERLIPIDEMIA: ICD-10-CM

## 2025-04-18 DIAGNOSIS — I73.9 PERIPHERAL VASCULAR DISEASE: Primary | ICD-10-CM

## 2025-04-18 DIAGNOSIS — E11.65 TYPE 2 DIABETES MELLITUS WITH HYPERGLYCEMIA, WITH LONG-TERM CURRENT USE OF INSULIN: ICD-10-CM

## 2025-04-18 DIAGNOSIS — I25.10 CORONARY ARTERY DISEASE INVOLVING NATIVE CORONARY ARTERY OF NATIVE HEART WITHOUT ANGINA PECTORIS: ICD-10-CM

## 2025-04-18 DIAGNOSIS — I10 PRIMARY HYPERTENSION: ICD-10-CM

## 2025-04-18 DIAGNOSIS — E66.3 OVERWEIGHT: ICD-10-CM

## 2025-04-18 NOTE — LETTER
April 18, 2025     KRISHNA Powell  [facility] [place] [address]    Patient: Constanza Mccall   YOB: 1975   Date of Visit: 4/18/2025     Dear KRISHNA Powell:       Thank you for referring Constanza Mccall to me for evaluation. Below are the relevant portions of my assessment and plan of care.    If you have questions, please do not hesitate to call me. I look forward to following Constanza along with you.         Sincerely,        Mariano Brar DO        CC: No Recipients    Mariano Brar DO  04/18/25 1550  Sign when Signing Visit  Chief Complaint  Follow-up (6 mo)    Subjective       Constanza Mccall presents to [facility] CARDIOLOGY  History of Present Illness  80-year-old female presents for follow-up.  Last seen in clinic on October 25 2024.  She has a past medical history of PVD, hypertension, hyperlipidemia, tobacco use, CAD, diabetes, diabetic neuropathy.  On her last routine clinic visit she had no acute complaints or issues.    Today she has no acute cardiac complaints.  She denies chest pain, palpitations, shortness of breath, dyspnea on exertion, or claudication symptoms.    Of note in the past she had undergone a left heart catheterization for a stress test that showed a high pretest probability.  Left heart catheterization revealed small vessel coronary disease in the diagonal branch as well as a  of the OM1 which was not amendable to revascularization.    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  Ultimately she was placed on  "anticoagulation and followed up with repeat AIF with apparent improvement in her distal below the knee runoff.  During that second procedure she was noted to have ostial SFA disease greater than 80% with significant greater than 30 mm gradient between the SFA and femoral artery.  It underwent CSI with drug-coated balloon angioplasty with good results in mild dissection which healed.  She was ultimately brought back demonstrating good healing of this ostial SFA as well as good distal runoff but she had recurrence of stenosis in the peroneal TP trunk as well as proximal anterior tibial on the left.     Ultimately she had an amputation of the left leg below the knee with advanced peripheral vascular disease, heavy smoking and underlying diabetes.     Review of Systems   Constitutional: Negative.    HENT: Negative.     Respiratory: Negative.     Cardiovascular: Negative.    Musculoskeletal: Negative.    Skin: Negative.    Neurological: Negative.         Objective  Vital Signs:  /59 (BP Location: Right arm, Patient Position: Sitting)   Pulse 63 Comment: ekg  Resp 16   Ht 175.3 cm (69.02\")   SpO2 96%   BMI 27.78 kg/m²   Estimated body mass index is 27.78 kg/m² as calculated from the following:    Height as of this encounter: 175.3 cm (69.02\").    Weight as of 2/4/25: 85.4 kg (188 lb 3.2 oz).            Physical Exam  Vitals and nursing note reviewed.   Constitutional:       General: She is not in acute distress.     Appearance: Normal appearance. She is not toxic-appearing.   HENT:      Head: Normocephalic and atraumatic.      Nose: Nose normal.      Mouth/Throat:      Mouth: Mucous membranes are moist.      Pharynx: Oropharynx is clear.   Eyes:      Pupils: Pupils are equal, round, and reactive to light.   Cardiovascular:      Rate and Rhythm: Normal rate and regular rhythm.      Pulses: Normal pulses.      Heart sounds: Normal heart sounds.   Pulmonary:      Effort: Pulmonary effort is normal.      Breath " sounds: Normal breath sounds.   Abdominal:      General: Bowel sounds are normal.      Palpations: Abdomen is soft.   Musculoskeletal:         General: Normal range of motion.      Cervical back: Normal range of motion and neck supple.      Left Lower Extremity: Left leg is amputated above knee.   Skin:     General: Skin is warm and dry.   Neurological:      General: No focal deficit present.      Mental Status: She is alert and oriented to person, place, and time.   Psychiatric:         Mood and Affect: Mood normal.         Behavior: Behavior normal.         Thought Content: Thought content normal.         Judgment: Judgment normal.        Result Review:  The following data was reviewed by: Mariano Brar DO on 04/18/2025:  Common labs          5/2/2024    10:54 5/3/2024    00:44 1/10/2025    11:00   Common Labs   Glucose  139  112    BUN  13  9    Creatinine  0.78  0.85    Sodium  137  139    Potassium  4.3  4.1    Chloride  101  104    Calcium  9.0  9.7    Albumin   4.1    Total Bilirubin   0.3    Alkaline Phosphatase   79    AST (SGOT)   17    ALT (SGPT)   18    WBC 5.98  6.92  8.28    Hemoglobin 9.1  9.1  13.3    Hematocrit 29.3  28.4  42.9    Platelets 153  161  273    Total Cholesterol   142    Triglycerides   127    HDL Cholesterol   41    LDL Cholesterol    78    Hemoglobin A1C   6.82    Microalbumin, Urine   <1.2      CMP          5/1/2024    14:53 5/3/2024    00:44 1/10/2025    11:00   CMP   Glucose 183  139  112    BUN 12  13  9    Creatinine 0.71  0.78  0.85    EGFR 104.4  93.2  84.1    Sodium 137  137  139    Potassium 4.3  4.3  4.1    Chloride 103  101  104    Calcium 9.0  9.0  9.7    Total Protein 6.4   7.1    Albumin 3.9   4.1    Globulin 2.5   3.0    Total Bilirubin 0.3   0.3    Alkaline Phosphatase 74   79    AST (SGOT) 18   17    ALT (SGPT) 18   18    Albumin/Globulin Ratio 1.6   1.4    BUN/Creatinine Ratio 16.9  16.7  10.6    Anion Gap 12.0  7.0  9.9      CBC          5/2/2024    10:54 5/3/2024     00:44 1/10/2025    11:00   CBC   WBC 5.98  6.92  8.28    RBC 3.03  3.10  4.79    Hemoglobin 9.1  9.1  13.3    Hematocrit 29.3  28.4  42.9    MCV 96.7  91.6  89.6    MCH 30.0  29.4  27.8    MCHC 31.1  32.0  31.0    RDW 13.1  13.2  13.4    Platelets 153  161  273      CBC w/diff          5/2/2024    10:54 5/3/2024    00:44 1/10/2025    11:00   CBC w/Diff   WBC 5.98  6.92  8.28    RBC 3.03  3.10  4.79    Hemoglobin 9.1  9.1  13.3    Hematocrit 29.3  28.4  42.9    MCV 96.7  91.6  89.6    MCH 30.0  29.4  27.8    MCHC 31.1  32.0  31.0    RDW 13.1  13.2  13.4    Platelets 153  161  273    Neutrophil Rel % 61.3  50.8  53.9    Immature Granulocyte Rel % 0.3  0.3  0.2    Lymphocyte Rel % 28.1  35.8  34.7    Monocyte Rel % 8.7  9.7  6.5    Eosinophil Rel % 1.3  3.0  4.3    Basophil Rel % 0.3  0.4  0.4      Lipid Panel          5/1/2024    14:53 1/10/2025    11:00   Lipid Panel   Total Cholesterol 107  142    Triglycerides 104  127    HDL Cholesterol 30  41    VLDL Cholesterol 20  23    LDL Cholesterol  57  78    LDL/HDL Ratio 1.87  1.84      TSH          5/1/2024    08:02 1/10/2025    11:00   TSH   TSH 1.960  1.110           ECG 12 Lead    Date/Time: 4/18/2025 3:41 PM  Performed by: Mariano Brar DO    Authorized by: Mariano Brar DO  Rhythm: sinus rhythm  Other findings: low voltage    Clinical impression: normal ECG          TTE 10/2023  •  Left ventricular systolic function is normal. Left ventricular ejection fraction appears to be 61 - 65%.  •  Left ventricular diastolic function is consistent with (grade I) impaired relaxation.     Stress Testing 12/2023  •  Left ventricular ejection fraction is normal (Calculated EF = 70%).  •  Myocardial perfusion imaging indicates a normal myocardial perfusion study with no evidence of ischemia.  •  Impressions are consistent with a low risk study.  •  This is normal Cardiolite imaging stress test with no evidence of ischemia or myocardial infarction.  Left ventricle size and  function is normal on gated SPECT imaging.  No wall motion abnormality was noted.  Clinical correlation recommended.  Further recommendation as per ordering physician. .  •  Findings consistent with a normal ECG stress test.    Assessment and Plan   Diagnoses and all orders for this visit:    1. Peripheral vascular disease (Primary)  Assessment & Plan:  Status post left above-the-knee amputation  Repeat duplex of the right lower extremity    Orders:  -     US Arterial Doppler Lower Extremity Right; Future    2. Coronary artery disease involving native coronary artery of native heart without angina pectoris  Assessment & Plan:  Continue Plavix  Stable heart disease.  Reports no chest pain      3. Primary hypertension  Assessment & Plan:  Continue current medical management      4. Mixed hyperlipidemia  Assessment & Plan:  Continue atorvastatin      5. Overweight    6. Type 2 diabetes mellitus with hyperglycemia, with long-term current use of insulin  Assessment & Plan:  Continue following with endocrinology.      Other orders  -     ECG 12 Lead             Follow Up   No follow-ups on file.  Patient was given instructions and counseling regarding her condition or for health maintenance advice. Please see specific information pulled into the AVS if appropriate.

## 2025-04-24 DIAGNOSIS — I73.9 PERIPHERAL VASCULAR DISEASE: Primary | ICD-10-CM

## 2025-05-02 ENCOUNTER — HOSPITAL ENCOUNTER (OUTPATIENT)
Dept: CARDIOLOGY | Facility: HOSPITAL | Age: 50
Discharge: HOME | End: 2025-05-02
Payer: MEDICAID

## 2025-05-02 DIAGNOSIS — E11.42 DIABETIC PERIPHERAL NEUROPATHY: ICD-10-CM

## 2025-05-02 DIAGNOSIS — I73.9 PERIPHERAL VASCULAR DISEASE: ICD-10-CM

## 2025-05-02 DIAGNOSIS — E11.65 TYPE 2 DIABETES MELLITUS WITH HYPERGLYCEMIA, WITH LONG-TERM CURRENT USE OF INSULIN: Primary | ICD-10-CM

## 2025-05-02 DIAGNOSIS — Z79.4 TYPE 2 DIABETES MELLITUS WITH HYPERGLYCEMIA, WITH LONG-TERM CURRENT USE OF INSULIN: Primary | ICD-10-CM

## 2025-05-02 LAB
BH CV LOWER ARTERIAL RIGHT ABI RATIO: 0.86
BH CV LOWER ARTERIAL RIGHT DORSALIS PEDIS SYS MAX: 101
BH CV LOWER ARTERIAL RIGHT GREAT TOE SYS MAX: 67
BH CV LOWER ARTERIAL RIGHT POST TIBIAL SYS MAX: 93
BH CV LOWER ARTERIAL RIGHT TBI RATIO: 0.57
UPPER ARTERIAL LEFT ARM BRACHIAL SYS MAX: 117
UPPER ARTERIAL RIGHT ARM BRACHIAL SYS MAX: 109

## 2025-05-02 PROCEDURE — 93922 UPR/L XTREMITY ART 2 LEVELS: CPT

## 2025-05-06 ENCOUNTER — TELEPHONE (OUTPATIENT)
Dept: FAMILY MEDICINE CLINIC | Facility: CLINIC | Age: 50
End: 2025-05-06
Payer: MEDICAID

## 2025-05-06 NOTE — TELEPHONE ENCOUNTER
Mariano GALLARDO , Prosthetist with [facility] states that she first needs an appt with you to see and document that it no longer fits due to Limb size maturization.  She will need an order for a new socket, she is wearing 21 apply socks and it is still too big.  She has had this one since 4-2023.She now has a pressure ulcer on her L anterior distal tibia due to this.

## 2025-05-06 NOTE — TELEPHONE ENCOUNTER
Caller: Constanza Mccall    Relationship: Self    Best call back number: [phone]     Additional notes: PATIENT NEEDS NEW ORDER FOR PROSTHETIC LEG, CURRENT LEG DOESN'T FIT PROPERLY.      [facility]   [phone]

## 2025-05-08 NOTE — TELEPHONE ENCOUNTER
I spoke to pt and made her an appt for tomorrow, also advised to call [facility] and see if there is any type of forms she will need filled out so that she can bring with her

## 2025-05-09 ENCOUNTER — OFFICE VISIT (OUTPATIENT)
Dept: FAMILY MEDICINE CLINIC | Facility: CLINIC | Age: 50
End: 2025-05-09
Payer: MEDICAID

## 2025-05-09 VITALS
BODY MASS INDEX: 28.88 KG/M2 | HEIGHT: 69 IN | HEART RATE: 63 BPM | OXYGEN SATURATION: 98 % | RESPIRATION RATE: 15 BRPM | TEMPERATURE: 97 F | SYSTOLIC BLOOD PRESSURE: 115 MMHG | DIASTOLIC BLOOD PRESSURE: 79 MMHG | WEIGHT: 195 LBS

## 2025-05-09 DIAGNOSIS — T87.89 PRESSURE ULCER OF BKA STUMP, STAGE 2: ICD-10-CM

## 2025-05-09 DIAGNOSIS — L89.892 PRESSURE ULCER OF BKA STUMP, STAGE 2: ICD-10-CM

## 2025-05-09 DIAGNOSIS — E55.9 VITAMIN D DEFICIENCY: ICD-10-CM

## 2025-05-09 DIAGNOSIS — Z89.512 STATUS POST BELOW-KNEE AMPUTATION OF LEFT LOWER EXTREMITY: ICD-10-CM

## 2025-05-09 DIAGNOSIS — Z44.8 ENCOUNTER FOR FITTING AND ADJUSTMENT OF OTHER EXTERNAL PROSTHETIC DEVICES: Primary | ICD-10-CM

## 2025-05-09 DIAGNOSIS — L08.9 TOE INFECTION: ICD-10-CM

## 2025-05-09 NOTE — PATIENT INSTRUCTIONS
Call your pharmacy for refills within 7 days of needing medication.     Please call the office if you have any questions or concerns.    Thank you,  JAMES Wood    Office number:   [phone]

## 2025-05-09 NOTE — PROGRESS NOTES
Subjective        Constanza Mccall is a 50 y.o. female who presents to [facility].     Chief Complaint   Patient presents with    Leg Injury     L leg below knee amputee    needs a new socket evaluation due to limb maturation   Pressure for due to prosthesis not fitting anymore      Nail Problem     R great toe       History of Present Illness    Pt here for new order for new prosthetic LLE limb.    Left BKA - Ill fitting prosthetic limb. Needs a new device, this one has caused a sore due to constant rubbing.  Stage 2 pressure ulcer of LBKA - caused by ill fitting prosthesis.  Rt great toe infection - Unsure how it occurred she said. Hx of DM T2 puts her at risk for worsening infection.  Vitamin D deficiency -     The following portions of the patient's history were reviewed and updated as appropriate: allergies, current medications, past family history, past medical history, past social history, past surgical history and problem list.    No Known Allergies       Current Outpatient Medications:     Accu-Chek Softclix Lancets lancets, CHECK BLOOD GLUCOSE TWICE DAILY, Disp: , Rfl:     atorvastatin (LIPITOR) 40 MG tablet, TAKE 1 TABLET BY MOUTH EVERY NIGHT, Disp: 90 tablet, Rfl: 3    citalopram (CeleXA) 20 MG tablet, Take 1 tablet by mouth Daily., Disp: 90 tablet, Rfl: 1    clopidogrel (PLAVIX) 75 MG tablet, TAKE 1 TABLET BY MOUTH DAILY, Disp: 90 tablet, Rfl: 3    empagliflozin (Jardiance) 10 MG tablet tablet, Take 1 tablet by mouth Daily., Disp: 90 tablet, Rfl: 1    gabapentin (NEURONTIN) 400 MG capsule, TAKE 1 CAPSULE BY MOUTH THREE TIMES DAILY, Disp: 270 capsule, Rfl: 0    Insulin Pen Needle (Pen Needles) 32G X 4 MM misc, Use 1 each Every Night., Disp: 100 each, Rfl: 2    Lancets misc, Use 1 each 4 (Four) Times a Day Before Meals & at Bedtime., Disp: 100 each, Rfl: 5    lisinopril (PRINIVIL,ZESTRIL) 5 MG tablet, TAKE 1 TABLET BY MOUTH DAILY, Disp: 90 tablet, Rfl: 0    metFORMIN  "(GLUCOPHAGE) 1000 MG tablet, Take 1 tablet by mouth 2 (Two) Times a Day With Meals for 360 days., Disp: 180 tablet, Rfl: 3    Rivaroxaban (Xarelto) 2.5 MG tablet, TAKE 1 TABLET BY MOUTH TWICE DAILY WITH MEALS, Disp: 180 tablet, Rfl: 3    cholecalciferol (VITAMIN D3) 1.25 MG (91562 UT) capsule, Take 1 capsule by mouth Every 7 (Seven) Days., Disp: 12 capsule, Rfl: 1    glucose monitor monitoring kit, Use 1 each 4 (Four) Times a Day Before Meals & at Bedtime., Disp: 1 each, Rfl: 0    Review of Systems     Objective     /79   Pulse 63   Temp 97 °F (36.1 °C) (Temporal)   Resp 15   Ht 175.3 cm (69.02\")   Wt 88.5 kg (195 lb)   SpO2 98%   BMI 28.78 kg/m²         Physical Exam  Vitals reviewed.   Constitutional:       General: She is not in acute distress.     Appearance: Normal appearance. She is normal weight. She is not ill-appearing.   Cardiovascular:      Rate and Rhythm: Normal rate and regular rhythm.      Heart sounds: Normal heart sounds.   Pulmonary:      Effort: Pulmonary effort is normal.      Breath sounds: Normal breath sounds.   Musculoskeletal:        Legs:         Feet:       Comments: 2 cm round pressure ulcer on bottom of left stump. Currently with scabbing and peeling of surrounding skin. Pt reports rawness of area when she ambulates throughout the day. She says it is painful when it is raw and red. She tries to avoid getting up and walking to keep this from occurring.    She currently is wearing FOUR sock liners and has TWO washcloths stuffed in her prosthesis to make it fit more snugly, which is NOT helping.      Left Lower Extremity: Left leg is amputated below knee.   Feet:      Comments: Sore with partial scabbing and some straw colored drainage, redness/warmth/tenderness in surrounding tissue.  Skin:     General: Skin is warm.      Capillary Refill: Capillary refill takes 2 to 3 seconds.   Neurological:      General: No focal deficit present.      Mental Status: She is alert and " oriented to person, place, and time.      Sensory: No sensory deficit.      Motor: No weakness.      Gait: Gait abnormal.      Comments: Her gait is unstable due to ill fitting prosthesis. This has limited her mobility and threatens her safety with ambulation.   Psychiatric:         Mood and Affect: Mood normal.         Behavior: Behavior normal.         Thought Content: Thought content normal.         Judgment: Judgment normal.            Result Review    The following data was reviewed by: KRISHNA Flaherty on 05/09/2025:  Common labs          1/10/2025    11:00 5/5/2025    07:44   Common Labs   Glucose 112     BUN 9     Creatinine 0.85     Sodium 139     Potassium 4.1     Chloride 104     Calcium 9.7     Albumin 4.1     Total Bilirubin 0.3     Alkaline Phosphatase 79     AST (SGOT) 17     ALT (SGPT) 18     WBC 8.28     Hemoglobin 13.3     Hematocrit 42.9     Platelets 273     Total Cholesterol 142     Total Cholesterol  117    Triglycerides 127  180    HDL Cholesterol 41  26    LDL Cholesterol  78  61    Hemoglobin A1C 6.82  6.8    Microalbumin, Urine <1.2                  Assessment & Plan    Diagnoses and all orders for this visit:    1. Encounter for fitting and adjustment of other external prosthetic devices (Primary)    2. Vitamin D deficiency  -     cholecalciferol (VITAMIN D3) 1.25 MG (59453 UT) capsule; Take 1 capsule by mouth Every 7 (Seven) Days.  Dispense: 12 capsule; Refill: 1    3. Status post below-knee amputation of left lower extremity    4. Pressure ulcer of BKA stump, stage 2  -     cephalexin (KEFLEX) 250 MG capsule; Take 1 capsule by mouth 4 (Four) Times a Day for 7 days.  Dispense: 28 capsule; Refill: 0    5. Toe infection  -     cephalexin (KEFLEX) 250 MG capsule; Take 1 capsule by mouth 4 (Four) Times a Day for 7 days.  Dispense: 28 capsule; Refill: 0       Patient Instructions   Call your pharmacy for refills within 7 days of needing medication.     Please call the office if you have  any questions or concerns.    Thank you,  JAMES Wood    Office number:   [phone]       Follow Up   Return in about 3 months (around 8/9/2025) for Recheck.    Patient was given instructions and counseling regarding her condition or for health maintenance advice. Please see specific information pulled into the AVS if appropriate.     KRISHNA Flaherty     05/20/25

## 2025-05-20 PROBLEM — Z89.512 STATUS POST BELOW-KNEE AMPUTATION OF LEFT LOWER EXTREMITY: Status: ACTIVE | Noted: 2025-05-20

## 2025-05-20 PROBLEM — L08.9 TOE INFECTION: Status: ACTIVE | Noted: 2025-05-20

## 2025-05-20 PROBLEM — Z44.8 ENCOUNTER FOR FITTING AND ADJUSTMENT OF OTHER EXTERNAL PROSTHETIC DEVICES: Status: ACTIVE | Noted: 2025-05-20

## 2025-05-20 PROBLEM — L89.892 PRESSURE ULCER OF BKA STUMP, STAGE 2: Status: ACTIVE | Noted: 2025-05-20

## 2025-05-20 PROBLEM — E55.9 VITAMIN D DEFICIENCY: Status: ACTIVE | Noted: 2025-05-20

## 2025-05-20 PROBLEM — T87.89 PRESSURE ULCER OF BKA STUMP, STAGE 2: Status: ACTIVE | Noted: 2025-05-20

## 2025-05-22 ENCOUNTER — OFFICE VISIT (OUTPATIENT)
Dept: ENDOCRINOLOGY | Facility: CLINIC | Age: 50
End: 2025-05-22
Payer: MEDICAID

## 2025-05-22 VITALS
DIASTOLIC BLOOD PRESSURE: 78 MMHG | HEART RATE: 76 BPM | OXYGEN SATURATION: 96 % | SYSTOLIC BLOOD PRESSURE: 118 MMHG | WEIGHT: 191 LBS | HEIGHT: 69 IN | BODY MASS INDEX: 28.29 KG/M2

## 2025-05-22 DIAGNOSIS — E11.42 DIABETIC PERIPHERAL NEUROPATHY: ICD-10-CM

## 2025-05-22 DIAGNOSIS — E11.65 TYPE 2 DIABETES MELLITUS WITH HYPERGLYCEMIA, WITH LONG-TERM CURRENT USE OF INSULIN: Primary | ICD-10-CM

## 2025-05-22 DIAGNOSIS — Z79.4 TYPE 2 DIABETES MELLITUS WITH HYPERGLYCEMIA, WITH LONG-TERM CURRENT USE OF INSULIN: Primary | ICD-10-CM

## 2025-05-22 DIAGNOSIS — E66.3 OVERWEIGHT: ICD-10-CM

## 2025-05-22 RX ORDER — TIRZEPATIDE 2.5 MG/.5ML
2.5 INJECTION, SOLUTION SUBCUTANEOUS WEEKLY
Qty: 2 ML | Refills: 5 | Status: SHIPPED | OUTPATIENT
Start: 2025-05-22

## 2025-05-22 NOTE — PROGRESS NOTES
-----------------------------------------------------------------  ENDOCRINE CLINIC NOTE  -----------------------------------------------------------------        PATIENT NAME: Constanza Mccall  PATIENT : 1975 AGE: 50 y.o.  MRN NUMBER: [id number]  PRIMARY CARE: Shweta Osorio APRN    ==========================================================================    CHIEF COMPLAINT: T2DM  DATE OF SERVICE: 25    ==========================================================================    HPI / SUBJECTIVE    50 y.o. female is seen in the clinic today for T2DM.  Diagnosis Date: around 10 yr ago  Current Therapy / Medications:  Metformin 1000 mg twice a day  Jardiance 10 mg 1 pill by mouth daily  Past tried medications: (Not currently using)  Lantus - not taking for around 10 months  Home BG logs / monitoring: Finger sticks  Once meals a day  No recent eye exams  Hx of left BKA in   Since last visit had left hip fracture in 2024  Nephropathy: no CKD  No hx of CAD but have PAD    ==========================================================================                                                PAST MEDICAL HISTORY    Past Medical History:   Diagnosis Date    Coronary artery disease     Diabetes mellitus     History of echocardiogram 2018    EF 60% Normal LV diastolic filling parameters. There is normal LV wall thickness. Normla trileaflet aortic valve. Normal mitral valve. Normal LV/RV fucntion with no significant valvulopathy. Normla right anf left pressures estimated.     Hyperlipidemia 2023    Hypertension 2023    Neuropathy     Numbness and tingling sensation of skin 2017    Peripheral artery disease     Rash of hands 2017    Type 2 diabetes mellitus        ==========================================================================    PAST SURGICAL HISTORY    Past Surgical History:   Procedure Laterality Date    AMPUTATION DIGIT Left 6/3/2022    Procedure:  INCISION AND DRAINAGE WITH AMPUTATION OF LEFT 3RD TOE;  Surgeon: Deonte Heath Jr., DPM;  Location: [facility] MAIN OR;  Service: Podiatry;  Laterality: Left;    CARDIAC CATHETERIZATION N/A 10/15/2019    Procedure: Left Heart Cath;  Surgeon: Merrick Sarmiento MD;  Location: [facility] CATH INVASIVE LOCATION;  Service: Cardiology    CHOLECYSTECTOMY      OOPHORECTOMY      TOTAL HIP ARTHROPLASTY Left 5/1/2024    Procedure: TOTAL HIP ARTHROPLASTY ANTERIOR WITH TRACTION PIN PLACEMENT;  Surgeon: Michi Solis II, MD;  Location: [facility] MAIN OR;  Service: Orthopedics;  Laterality: Left;       ==========================================================================    FAMILY HISTORY    Family History   Problem Relation Age of Onset    No Known Problems Mother     Hypertension Father     Diabetes Sister     Heart disease Sister        ==========================================================================    SOCIAL HISTORY    Social History     Socioeconomic History    Marital status:      Spouse name: Sergey    Number of children: 2    Years of education: 12   Tobacco Use    Smoking status: Every Day     Current packs/day: 2.50     Average packs/day: 2.5 packs/day for 20.0 years (50.0 ttl pk-yrs)     Types: Cigarettes     Passive exposure: Current    Smokeless tobacco: Never    Tobacco comments:     Patient advised to quit smoking   Vaping Use    Vaping status: Never Used   Substance and Sexual Activity    Alcohol use: No    Drug use: No    Sexual activity: Defer       ==========================================================================    MEDICATIONS      Current Outpatient Medications:     Accu-Chek Softclix Lancets lancets, CHECK BLOOD GLUCOSE TWICE DAILY, Disp: , Rfl:     atorvastatin (LIPITOR) 40 MG tablet, TAKE 1 TABLET BY MOUTH EVERY NIGHT, Disp: 90 tablet, Rfl: 3    cholecalciferol (VITAMIN D3) 1.25 MG (98965 UT) capsule, Take 1 capsule by mouth Every 7 (Seven) Days., Disp: 12 capsule,  Rfl: 1    citalopram (CeleXA) 20 MG tablet, Take 1 tablet by mouth Daily., Disp: 90 tablet, Rfl: 1    clopidogrel (PLAVIX) 75 MG tablet, TAKE 1 TABLET BY MOUTH DAILY, Disp: 90 tablet, Rfl: 3    empagliflozin (Jardiance) 10 MG tablet tablet, Take 1 tablet by mouth Daily., Disp: 90 tablet, Rfl: 1    gabapentin (NEURONTIN) 400 MG capsule, TAKE 1 CAPSULE BY MOUTH THREE TIMES DAILY, Disp: 270 capsule, Rfl: 0    glucose monitor monitoring kit, Use 1 each 4 (Four) Times a Day Before Meals & at Bedtime., Disp: 1 each, Rfl: 0    Insulin Pen Needle (Pen Needles) 32G X 4 MM misc, Use 1 each Every Night., Disp: 100 each, Rfl: 2    Lancets misc, Use 1 each 4 (Four) Times a Day Before Meals & at Bedtime., Disp: 100 each, Rfl: 5    lisinopril (PRINIVIL,ZESTRIL) 5 MG tablet, TAKE 1 TABLET BY MOUTH DAILY, Disp: 90 tablet, Rfl: 0    metFORMIN (GLUCOPHAGE) 1000 MG tablet, Take 1 tablet by mouth 2 (Two) Times a Day With Meals for 360 days., Disp: 180 tablet, Rfl: 3    Rivaroxaban (Xarelto) 2.5 MG tablet, TAKE 1 TABLET BY MOUTH TWICE DAILY WITH MEALS, Disp: 180 tablet, Rfl: 3    ==========================================================================    ALLERGIES    No Known Allergies    ==========================================================================    OBJECTIVE    Vitals:    05/22/25 0936   BP: 118/78   Pulse: 76   SpO2: 96%       Body mass index is 28.19 kg/m².     General: Alert, cooperative, no acute distress  Extremities:  Left sided prosthetic in place    ==========================================================================    LAB EVALUATION    Lab Results   Component Value Date    GLUCOSE 112 (H) 01/10/2025    BUN 9 01/10/2025    CREATININE 0.85 01/10/2025    EGFRIFNONA 122 03/12/2021    BCR 10.6 01/10/2025    K 4.1 01/10/2025    CO2 25.1 01/10/2025    CALCIUM 9.7 01/10/2025    ALBUMIN 4.1 01/10/2025    LABIL2 1.8 05/25/2021    AST 17 01/10/2025    ALT 18 01/10/2025    CHOL 142 01/10/2025    TRIG 180 (H)  "05/05/2025    HDL 26 (L) 05/05/2025    LDL 61 05/05/2025     Lab Results   Component Value Date    HGBA1C 6.8 (H) 05/05/2025    HGBA1C 6.82 (H) 01/10/2025    HGBA1C 6.20 (H) 05/01/2024     Lab Results   Component Value Date    MICROALBUR <1.2 01/10/2025    CREATININE 0.85 01/10/2025     Lab Results   Component Value Date    TSH 1.470 05/05/2025       ==========================================================================    ASSESSMENT AND PLAN    # Type 2 diabetes with hyperglycemia  # Diabetic peripheral neuropathy  # Overweight with BMI of 28.19    - Patient A1c is still holding at 6.8%  - Target is to maintain A1c less than 6.5% without any hypoglycemia  - Last microalbumin January 2025 was negative  - Discussed with patient about different therapeutic option and after discussing benefit and side effect of GLP-1 receptor agonist therapy patient wants to pursue GLP-1 RA  - Will continue metformin and Jardiance therapy  - New adjusted regimen:  Metformin 1000 mg twice a day  Jardiance 10 mg 1 pill by mouth daily  Mounjaro 2.5 mg once a week  - Referral sent again for diabetic eye exam again  - Patient to continue blood sugar monitoring through fingersticks 2-3 times a week  - Follow up in 4 months    Return to clinic: 4 months    Entire assessment and plan was discussed and counseled the patient in detail to which patient verbalized understanding and agreed with care.  Answered all queries and concerns.    This note was created using voice recognition software and is inherently subject to errors including those of syntax and \"sound-alike\" substitutions which may escape proofreading.  In such instances, original meaning may be extrapolated by contextual derivation.    Note: Portions of this note may have been copied from previous notes but documentation have been reviewed and edited as necessary to support clinical decision making for today's " visit.    ==========================================================================    INFORMATION PROVIDED TO PATIENT    Patient Instructions   Please,    - Continue metformin therapy 1000 mg twice a day.  - Continue Jardiance 10 mg 1 pill by mouth daily.    - Start Mounajro 2.5 mgs once a week.    Check your blood sugar twice a week in the morning and maintain logs.    Please make appointment to see ophthalmologist for diabetic eye exam.    Repeat blood work before next visit.    Clinical follow-up in 4 months time.    Thank you for your visit today.    If you have any questions or concerns please feel free to reach out of the office.       ==========================================================================  Tariq Polanco MD  Department of Endocrine, Diabetes and Metabolism  [place], [place]  ==========================================================================

## 2025-05-22 NOTE — PATIENT INSTRUCTIONS
Please,    - Continue metformin therapy 1000 mg twice a day.  - Continue Jardiance 10 mg 1 pill by mouth daily.    - Start Mounajro 2.5 mgs once a week.    Check your blood sugar twice a week in the morning and maintain logs.    Please make appointment to see ophthalmologist for diabetic eye exam.    Repeat blood work before next visit.    Clinical follow-up in 4 months time.    Thank you for your visit today.    If you have any questions or concerns please feel free to reach out of the office.

## 2025-05-27 ENCOUNTER — TELEPHONE (OUTPATIENT)
Dept: ENDOCRINOLOGY | Facility: CLINIC | Age: 50
End: 2025-05-27
Payer: MEDICAID

## 2025-05-27 NOTE — TELEPHONE ENCOUNTER
Patient is on mounjaro 2.5  PA is requested but the patient has not tried and failed TRUCILCITY or OZEMPIC    Only failed victoza    PA will be denied     Please advise

## 2025-05-29 RX ORDER — SEMAGLUTIDE 0.68 MG/ML
0.5 INJECTION, SOLUTION SUBCUTANEOUS WEEKLY
Qty: 3 ML | Refills: 5 | Status: SHIPPED | OUTPATIENT
Start: 2025-05-29

## 2025-05-30 NOTE — TELEPHONE ENCOUNTER
Please call patient and inform her that Mounjaro is not covered by the insurance and therefore I have ordered Ozempic therapy.  Start Ozempic Therapy as:  START Ozempic 0.25mg weekly x 4 weeks  Then 0.5mg weekly thereafter    Tariq Polanco MD  20:16 EDT  05/29/25

## 2025-06-18 ENCOUNTER — TELEPHONE (OUTPATIENT)
Dept: ENDOCRINOLOGY | Facility: CLINIC | Age: 50
End: 2025-06-18
Payer: MEDICAID

## 2025-06-18 RX ORDER — LISINOPRIL 5 MG/1
5 TABLET ORAL DAILY
Qty: 90 TABLET | Refills: 0 | Status: SHIPPED | OUTPATIENT
Start: 2025-06-18

## 2025-06-18 NOTE — TELEPHONE ENCOUNTER
Provider: RADHA    Caller: Constanza Mccall    Relationship to Patient: Self    Pharmacy: [facility]    Phone Number: [phone]    Reason for Call: PHARMACY STATES NEEDS TO TRY 2 OTHER MEDS 1ST. INSURANCE NEEDS TO KNOW WHY NEEDS TO BE ON OZEMPIC (REQUEST FORM-PA SENT FROM PHARMACY).

## 2025-06-20 ENCOUNTER — TELEPHONE (OUTPATIENT)
Dept: ENDOCRINOLOGY | Facility: CLINIC | Age: 50
End: 2025-06-20
Payer: MEDICAID

## 2025-06-20 RX ORDER — RIVAROXABAN 2.5 MG/1
TABLET, FILM COATED ORAL 2 TIMES DAILY WITH MEALS
Qty: 180 TABLET | Refills: 3 | Status: SHIPPED | OUTPATIENT
Start: 2025-06-20

## 2025-07-03 ENCOUNTER — TELEPHONE (OUTPATIENT)
Dept: ENDOCRINOLOGY | Facility: CLINIC | Age: 50
End: 2025-07-03
Payer: MEDICAID

## 2025-07-03 RX ORDER — ACYCLOVIR 400 MG/1
1 TABLET ORAL
Qty: 3 EACH | Refills: 6 | Status: SHIPPED | OUTPATIENT
Start: 2025-07-03

## 2025-07-30 DIAGNOSIS — G62.9 NEUROPATHY: ICD-10-CM

## 2025-07-31 RX ORDER — GABAPENTIN 400 MG/1
400 CAPSULE ORAL 3 TIMES DAILY
Qty: 270 CAPSULE | Refills: 0 | Status: SHIPPED | OUTPATIENT
Start: 2025-07-31

## (undated) DEVICE — GAUZE SPONGES,8 PLY: Brand: CURITY

## (undated) DEVICE — SUT ETHIB 2 CV V37 MS/4 30IN MX69G

## (undated) DEVICE — ZIP 16 SURGICAL SKIN CLOSURE DEVICE, PSA: Brand: ZIP 16 SURGICAL SKIN CLOSURE DEVICE

## (undated) DEVICE — APPL CHLORAPREP HI/LITE 26ML ORNG

## (undated) DEVICE — SOLUTION,WATER,IRRIGATION,1000ML,STERILE: Brand: MEDLINE

## (undated) DEVICE — UNDRGLV SURG BIOGEL PUNCTUREINDICATION SZ7 PF STRL

## (undated) DEVICE — KT PT POSITION SUPINE HANA/PROFX TABL

## (undated) DEVICE — GOWN,REINFORCE,POLY,SIRUS,BREATH SLV,XLG: Brand: MEDLINE

## (undated) DEVICE — TBG PENCL TELESCP MEGADYNE SMOKE EVAC 15FT

## (undated) DEVICE — ZIPPERED TOGA, 2X LARGE: Brand: FLYTE

## (undated) DEVICE — SPNG GZ WOVN 4X4IN 12PLY 10/BX STRL

## (undated) DEVICE — PACK,UNIVERSAL,NO GOWNS: Brand: MEDLINE

## (undated) DEVICE — GLV SURG SENSICARE PI ORTHO SZ8 LF STRL

## (undated) DEVICE — PK TRY HEART CATH 50

## (undated) DEVICE — KT SURG TURNOVER 050

## (undated) DEVICE — GW DIAG EMERALD HEPCOAT MOVE JTIP STD .035 3MM 150CM

## (undated) DEVICE — PK EXTREM 50

## (undated) DEVICE — PK TOTL HIP 50

## (undated) DEVICE — CATH DIAG IMPULSE PIG .056 6F 110CM

## (undated) DEVICE — SUT VIC 3/0 SH 27IN J416H

## (undated) DEVICE — GLV SURG SENSICARE PI ORTHO SZ8.5 LF STRL

## (undated) DEVICE — S/M FLEXIBLE ALEXIS ORTHOPAEDIC PROTECTOR: Brand: ALEXIS® ORTHOPAEDIC PROTECTOR

## (undated) DEVICE — UNDERGLV SURG BIOGEL INDICAT PI SZ8.5 BLU

## (undated) DEVICE — ST ACC MICROPUNCTURE STFF/CANN PLAT/TP 4F 21G 40CM

## (undated) DEVICE — C-ARM: Brand: UNBRANDED

## (undated) DEVICE — BNDG GZ SOF-FORM CONFRM 3X75IN LF STRL

## (undated) DEVICE — CATH DIAG IMPULSE FL4 6F 100CM

## (undated) DEVICE — SUT ETHLN 3/0 PS1 18IN 1663H

## (undated) DEVICE — NEEDLE, QUINCKE, 20GX3.5": Brand: MEDLINE

## (undated) DEVICE — SOL ISO/ALC RUB 91PCT 16OZ

## (undated) DEVICE — PICO 7 10CM X 20CM: Brand: PICO™ 7

## (undated) DEVICE — CUFF TOURNI 1BLADDER 1PRT 12IN STRL

## (undated) DEVICE — MYNXGRIP 6F/7F: Brand: MYNXGRIP

## (undated) DEVICE — 3M™ STERI-DRAPE™  ISOLATION DRAPE WITH INCISE FILM AND POUCH 1017: Brand: STERI-DRAPE™

## (undated) DEVICE — PAD HEMOST NEPTUNE 2X2IN

## (undated) DEVICE — BANDAGE,GAUZE,BULKEE II,4.5"X4.1YD,STRL: Brand: MEDLINE

## (undated) DEVICE — DRSNG WND GZ CURAD OIL EMULSION 3X3IN STRL

## (undated) DEVICE — RECIPROCATING BLADE HEAVY DUTY LONG, OFFSET  (77.6 X 0.77 X 11.2MM)

## (undated) DEVICE — SUT ETHLN 2/0 PS 18IN 585H

## (undated) DEVICE — DECANTER: Brand: UNBRANDED

## (undated) DEVICE — SLV SCD CALF HEMOFORCE DVT THERP REPROC MD

## (undated) DEVICE — IRRIGATOR BULB ASEPTO 60CC STRL

## (undated) DEVICE — 6617 IOBAN II PATIENT ISOLATION DRAPE 5/BX,4BX/CS: Brand: STERI-DRAPE™ IOBAN™ 2

## (undated) DEVICE — CATH DIAG IMPULSE FR4 6F 100CM

## (undated) DEVICE — SOL IRRIG NACL 1000ML

## (undated) DEVICE — 450 ML BOTTLE OF 0.05% CHLORHEXIDINE GLUCONATE IN 99.95% STERILE WATER FOR IRRIGATION, USP AND APPLICATOR.: Brand: IRRISEPT ANTIMICROBIAL WOUND LAVAGE

## (undated) DEVICE — IMPLANTABLE DEVICE
Type: IMPLANTABLE DEVICE | Site: HIP | Status: NON-FUNCTIONAL
Removed: 2024-05-01

## (undated) DEVICE — BNDG ELAS ELITE V/CLOSE 3IN 5YD LF STRL

## (undated) DEVICE — BNDG ESMARK 4IN 12FT LF STRL BLU

## (undated) DEVICE — PINNACLE INTRODUCER SHEATH: Brand: PINNACLE

## (undated) DEVICE — DRN PENRS 18 1/4IN LTX STRL